# Patient Record
Sex: FEMALE | Race: ASIAN | Employment: OTHER | URBAN - METROPOLITAN AREA
[De-identification: names, ages, dates, MRNs, and addresses within clinical notes are randomized per-mention and may not be internally consistent; named-entity substitution may affect disease eponyms.]

---

## 2017-02-06 DIAGNOSIS — E03.9 ACQUIRED HYPOTHYROIDISM: ICD-10-CM

## 2017-02-06 RX ORDER — LEVOTHYROXINE SODIUM 50 UG/1
TABLET ORAL
Qty: 90 TAB | Refills: 0 | Status: SHIPPED | OUTPATIENT
Start: 2017-02-06 | End: 2017-05-05 | Stop reason: SDUPTHER

## 2017-03-24 DIAGNOSIS — I15.9 SECONDARY HYPERTENSION: ICD-10-CM

## 2017-03-27 ENCOUNTER — CLINICAL SUPPORT (OUTPATIENT)
Dept: CARDIOLOGY CLINIC | Age: 82
End: 2017-03-27

## 2017-03-27 DIAGNOSIS — Z95.0 CARDIAC PACEMAKER IN SITU: Primary | ICD-10-CM

## 2017-03-27 RX ORDER — AMLODIPINE BESYLATE 10 MG/1
TABLET ORAL
Qty: 30 TAB | Refills: 7 | Status: SHIPPED | OUTPATIENT
Start: 2017-03-27 | End: 2017-09-28 | Stop reason: SDUPTHER

## 2017-04-18 ENCOUNTER — OFFICE VISIT (OUTPATIENT)
Dept: FAMILY MEDICINE CLINIC | Age: 82
End: 2017-04-18

## 2017-04-18 VITALS
HEART RATE: 69 BPM | BODY MASS INDEX: 19.04 KG/M2 | SYSTOLIC BLOOD PRESSURE: 150 MMHG | HEIGHT: 60 IN | DIASTOLIC BLOOD PRESSURE: 73 MMHG | WEIGHT: 97 LBS | RESPIRATION RATE: 18 BRPM | TEMPERATURE: 98.4 F | OXYGEN SATURATION: 98 %

## 2017-05-05 DIAGNOSIS — E03.9 ACQUIRED HYPOTHYROIDISM: ICD-10-CM

## 2017-05-07 RX ORDER — LEVOTHYROXINE SODIUM 50 UG/1
TABLET ORAL
Qty: 90 TAB | Refills: 0 | Status: SHIPPED | OUTPATIENT
Start: 2017-05-07 | End: 2017-08-04 | Stop reason: SDUPTHER

## 2017-06-14 ENCOUNTER — OFFICE VISIT (OUTPATIENT)
Dept: FAMILY MEDICINE CLINIC | Age: 82
End: 2017-06-14

## 2017-06-14 VITALS
DIASTOLIC BLOOD PRESSURE: 76 MMHG | BODY MASS INDEX: 18.31 KG/M2 | HEART RATE: 70 BPM | OXYGEN SATURATION: 97 % | RESPIRATION RATE: 16 BRPM | SYSTOLIC BLOOD PRESSURE: 151 MMHG | HEIGHT: 60 IN | TEMPERATURE: 97.9 F | WEIGHT: 93.25 LBS

## 2017-06-14 DIAGNOSIS — I10 ESSENTIAL HYPERTENSION: ICD-10-CM

## 2017-06-14 DIAGNOSIS — K21.9 GASTROESOPHAGEAL REFLUX DISEASE WITHOUT ESOPHAGITIS: ICD-10-CM

## 2017-06-14 DIAGNOSIS — Z00.00 ROUTINE GENERAL MEDICAL EXAMINATION AT HEALTH CARE FACILITY: ICD-10-CM

## 2017-06-14 DIAGNOSIS — R55 SYNCOPE AND COLLAPSE: ICD-10-CM

## 2017-06-14 DIAGNOSIS — E03.4 HYPOTHYROIDISM DUE TO ACQUIRED ATROPHY OF THYROID: ICD-10-CM

## 2017-06-14 DIAGNOSIS — Z00.00 MEDICARE ANNUAL WELLNESS VISIT, SUBSEQUENT: Primary | ICD-10-CM

## 2017-06-14 NOTE — MR AVS SNAPSHOT
Visit Information Date & Time Provider Department Dept. Phone Encounter #  
 6/14/2017  8:55 AM Leighann Ta MD [address] [place] [address] [phone] [id number] Your Appointments 6/27/2017 10:00 AM  
PRE OP with PACEMAKER, STFRANCES  
[facility] (KITTY SCHEDULING) Appt Note: stj magnet/stf/b 3-27-17  
 [address] [facility] [address] [facility] [address]  
[phone]  
  
   
 [address] [facility] [id number] [place] [address] [place]  
  
    
 9/28/2017  9:30 AM  
PACEMAKER with Mario Alberto Band, [place] Client [facility] (KITTY SCHEDULING) Appt Note: sjm threshold/rc/Garvin 1 yr b  no [address] [place]  
[phone]  
  
   
 [address] [place] [address] [address] [place]  
  
    
 9/28/2017  9:40 AM  
ESTABLISHED PATIENT with Justin Vu MD  
[facility] ([address] [facility]) Appt Note: sjm threshold/rc/Garvin 1 yr b  no [facility] [address] [place]  
[address] [address] Upcoming Health Maintenance Date Due DTaP/Tdap/Td series (1 - Tdap) 1/1/1944 Pneumococcal 65+ Low/Medium Risk (2 of 2 - PPSV23) 7/27/2009 GLAUCOMA SCREENING Q2Y 8/28/2016 MEDICARE YEARLY EXAM 6/14/2017 Allergies as of 6/14/2017  Review Complete On: 6/14/2017 By: Leighann Ta MD  
  
 Severity Noted Reaction Type Reactions Ace Inhibitors  06/03/2011    Cough Cozaar [Losartan]  06/03/2011    Angioedema Hydrochlorothiazide  05/28/2013    Other (comments), Vertigo Weak in legs, HYPONATREMIA (109 - Sept 2014 admission) Niaspan [Niacin]  06/03/2011    Nausea Only Pravastatin  09/06/2011    Other (comments)  
 dizzy Prilosec [Omeprazole]  03/05/2013    Myalgia Sular [Nisoldipine]  06/03/2011    Vertigo Current Immunizations  Reviewed on 8/28/2014 Name Date Pneumococcal Vaccine (Unspecified Type) 7/27/2004 Not reviewed this visit You Were Diagnosed With   
  
 Codes Comments Routine general medical examination at health care facility    -  Primary ICD-10-CM: Z00.00 ICD-9-CM: V70.0 Essential hypertension     ICD-10-CM: I10 
ICD-9-CM: 401.9 Syncope and collapse     ICD-10-CM: R55 
ICD-9-CM: 780.2 Hypothyroidism due to acquired atrophy of thyroid     ICD-10-CM: E03.4 ICD-9-CM: 244.8, 246.8 Gastroesophageal reflux disease without esophagitis     ICD-10-CM: K21.9 ICD-9-CM: 530.81 Vitals BP Pulse Temp Resp Height(growth percentile) Weight(growth percentile) 151/76 70 97.9 °F (36.6 °C) (Oral) 16 5' (1.524 m) 93 lb 4 oz (42.3 kg) LMP SpO2 BMI OB Status Smoking Status 03/05/1963 97% 18.21 kg/m2 Postmenopausal Never Smoker Vitals History BMI and BSA Data Body Mass Index Body Surface Area  
 18.21 kg/m 2 1.34 m 2 Preferred Pharmacy Pharmacy Name Phone [facility] [address] [place] [address], [address] [place] [address] [phone] Your Updated Medication List  
  
   
This list is accurate as of: 6/14/17  9:31 AM.  Always use your most recent med list. amLODIPine 10 mg tablet Commonly known as:  Mosetta Hark TAKE ONE TABLET BY MOUTH DAILY  
  
 brimonidine 0.1 % ophthalmic solution Commonly known as:  ALPHAGAN P Administer 1 drop to both eyes two (2) times a day. dorzolamide-timolol 22.3-6.8 mg/mL ophthalmic solution Commonly known as:  COSOPT Administer 1 drop to both eyes two (2) times a day. DULCOLAX (BISACODYL) PO Take  by mouth. FISH OIL 1,000 mg Cap Generic drug:  omega-3 fatty acids-vitamin e Take 1 capsule by mouth daily. furosemide 20 mg tablet Commonly known as:  LASIX Take 1 tablet by mouth daily. labetalol 100 mg tablet Commonly known as:  NORMODYNE  
TAKE ONE TABLET BY MOUTH EVERY 12 HOURS  
  
 latanoprost 0.005 % ophthalmic solution Commonly known as:  Haritha Nap Administer 1 drop to both eyes nightly. levothyroxine 50 mcg tablet Commonly known as:  SYNTHROID  
TAKE ONE TABLET BY MOUTH DAILY BEFORE BREAKFAST MULTIVITAMIN PO Take 1 tablet by mouth daily. prednisoLONE acetate 1 % ophthalmic suspension Commonly known as:  PRED FORTE Administer 1 drop to left eye every hour (while awake). Introducing [facility]! Teresita Estrada introduces [employer] patient portal. Now you can access parts of your medical record, email your doctor's office, and request medication refills online. 1. In your internet browser, go to [email or web address] 2. Click on the First Time User? Click Here link in the Sign In box. You will see the New Member Sign Up page. 3. Enter your [employer] Access Code exactly as it appears below. You will not need to use this code after youve completed the sign-up process. If you do not sign up before the expiration date, you must request a new code. · [employer] Access Code: [email or web address] Expires: 7/17/2017 10:35 AM 
 
4. Enter the last four digits of your Social Security Number (xxxx) and Date of Birth (mm/dd/yyyy) as indicated and click Submit. You will be taken to the next sign-up page. 5. Create a [employer] ID. This will be your [employer] login ID and cannot be changed, so think of one that is secure and easy to remember. 6. Create a [employer] password. You can change your password at any time. 7. Enter your Password Reset Question and Answer. This can be used at a later time if you forget your password. 8. Enter your e-mail address. You will receive e-mail notification when new information is available in [address] 9. Click Sign Up. You can now view and download portions of your medical record. 10. Click the Download Summary menu link to download a portable copy of your medical information. If you have questions, please visit the Frequently Asked Questions section of the [employer] website. Remember, [employer] is NOT to be used for urgent needs. For medical emergencies, dial 911. Now available from your iPhone and Android! Please provide this summary of care documentation to your next provider. Your primary care clinician is listed as Erick Mcqueen. If you have any questions after today's visit, please call [phone].

## 2017-06-14 NOTE — PROGRESS NOTES
[address]  [address] [facility] [address]  [phone]             Date of visit: 6/14/2017       I have reviewed the patient's medical history in detail and updated the computerized patient record. History obtained from: son and the patient. Concerns today   (Patient understands that medical problems addressed today may incur additional cost as this is a preventive visit)  -none    History     Patient Active Problem List   Diagnosis Code    Other and unspecified hyperlipidemia E78.5    HTN (hypertension) I10    GERD (gastroesophageal reflux disease) K21.9    Hypothyroid E03.9    Syncope and collapse R55    Atrioventricular block, complete (HCC) I44.2    Murmur, cardiac R01.1    Hot flashes R23.2    Glaucoma H40.9    Angioedema T78. 3XXA    H. 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     Past Medical History:   Diagnosis Date    Atrioventricular block, complete ([address] 2/15/2011    GERD (gastroesophageal reflux disease) 2/15/2011    HTN (hypertension) 2/15/2011    Hypercholesteremia     Hypothyroid 2/15/2011    Murmur     Osteoporosis     Other and unspecified hyperlipidemia 2/15/2011    Pacemaker     Syncope and collapse 2/15/2011    Thyroid disease       Past Surgical History:   Procedure Laterality Date    HX GYN      hyst    HX HEENT      corneal tx    HX PACEMAKER      INS PPM/ICD LED DUAL ONLY  9/4/2014          Allergies   Allergen Reactions    Ace Inhibitors Cough    Cozaar [Losartan] Angioedema    Hydrochlorothiazide Other (comments) and Vertigo     Weak in legs, HYPONATREMIA (109 - Sept 2014 admission)    Niaspan [Niacin] Nausea Only    Pravastatin Other (comments)     dizzy    Prilosec [Omeprazole] Myalgia    Sular [Nisoldipine] Vertigo     Current Outpatient Prescriptions   Medication Sig Dispense Refill    levothyroxine (SYNTHROID) 50 mcg tablet TAKE ONE TABLET BY MOUTH DAILY BEFORE BREAKFAST 90 Tab 0    amLODIPine (NORVASC) 10 mg tablet TAKE ONE TABLET BY MOUTH DAILY 30 Tab 7    labetalol (NORMODYNE) 100 mg tablet TAKE ONE TABLET BY MOUTH EVERY 12 HOURS 60 Tab 10    DULCOLAX, BISACODYL, PO Take  by mouth.  furosemide (LASIX) 20 mg tablet Take 1 tablet by mouth daily. 30 tablet 0    prednisolone acetate (PRED FORTE) 1 % ophthalmic suspension Administer 1 drop to left eye every hour (while awake).  latanoprost (XALATAN) 0.005 % ophthalmic solution Administer 1 drop to both eyes nightly.  dorzolamide-timolol (COSOPT) 2-0.5 % ophthalmic solution Administer 1 drop to both eyes two (2) times a day.  brimonidine (ALPHAGAN P) 0.1 % ophthalmic solution Administer 1 drop to both eyes two (2) times a day.  omega-3 fatty acids-vitamin e (FISH OIL) 1,000 mg Cap Take 1 capsule by mouth daily.  MULTIVITAMIN PO Take 1 tablet by mouth daily. Family History   Problem Relation Age of Onset    Alcohol abuse Brother      Social History   Substance Use Topics    Smoking status: Never Smoker    Smokeless tobacco: Never Used    Alcohol use No       Specialists/Care Team   Mahogany Hahn has established care with the following healthcare providers:  - Dr. Yvette Collado for glaucoma  - Dr. Delfina Parker for syncope, complete heart block. Health Risk Assessment     Demographics   female  80 y.o.  [address] Questions   -During the past 4 weeks:   -how would you rate your health in general? Fair   -how often have you been bothered by feeling dizzy when standing up? occasionally   -how much have you been bothered by bodily pain? mildly   -Have you noticed any hearing difficulties?  yes   -has your physical and emotional health limited your social activities with family or friends? no    Emotional Health Questions   -Do you have a history of depression, anxiety, or emotional problems? no  -Over the past 2 weeks, have you felt down, depressed or hopeless? no  -Over the past 2 weeks, have you felt little interest or pleasure in doing things? no    Health Habits   Please describe your diet habits: well balanced  Do you get 5 servings of fruits or vegetables daily? yes  Do you exercise regularly? yes    Activities of Daily Living and Functional Status   -Do you need help with eating, walking, dressing, bathing, toileting, the phone, transportation, shopping, preparing meals, housework, laundry, medications or managing money? no  -In the past four weeks, was someone available to help you if you needed and wanted help with anything? yes  -Are you confident are you that you can control and manage most of your health problems? yes  -Have you been given information to help you keep track of your medications? yes  -How often do you have trouble taking your medications as prescribed? never    Fall Risk and Home Safety   Have you fallen 2 or more times in the past year? yes and but no fractures. Does your home have rugs in the hallway, lack grab bars in the bathroom, lack handrails on the stairs or have poor lighting? yes  Do you have smoke detectors and check them regularly? yes  Do you have difficulties driving a car? no  Do you always fasten your seat belt when you are in a car? yes    Physical Examination     Vitals:    06/14/17 0852   BP: 151/76   Pulse: 70   Resp: 16   Temp: 97.9 °F (36.6 °C)   TempSrc: Oral   SpO2: 97%   Weight: 93 lb 4 oz (42.3 kg)   Height: 5' (1.524 m)     Body mass index is 18.21 kg/(m^2). No exam data present  Was the patient's timed Up & Go test unsteady or longer than 30 seconds?  yes    Evaluation of Cognitive Function   Mood/affect:  happy  Orientation: Person, Place, Time and Situation  Appearance: age appropriate    Preventive Services     (Preventive care checklist to be included in patient instructions)  Discussed today Done Previously Not Needed     x  Pneumococcal vaccines   x   Flu vaccine     x Hepatitis B vaccine (if at risk)   x   Shingles vaccine   x   TDAP vaccine     x Mammogram     x Pap smear     x Colorectal cancer screening     x Low-dose CT for lung cancer screening     x Bone density test    x  Glaucoma screening     x Cholesterol test     x Diabetes screening test      x Diabetes self-management class     x Nutritionist referral for diabetes or renal disease     Son and patient refuse immunization. Discussion of Advance Directive   Discussed with Kim Huffman her ability to prepare and advance directive in the case that an injury or illness causes her to be unable to make health care decisions.     - Son is mPOA. - advanced directive documentation given. Son will fill out the form and will bring it. Assessment/Plan     1. Routine general medical examination at health care facility  A health 80year old female. Doesn't like immunization anymore. 2. Essential hypertension  Mildly elevated. RTC in a month to recheck     3. Syncope and collapse  Continue follow up with Cards. 4. Hypothyroidism due to acquired atrophy of thyroid  Asymptomatic. Continue taking synthroid. AVS was printed, given to patient and briefly discussed prior to patient's departure from the office today.      Isidra Corey MD  [facility] Medicine Resident  Dwight Gomez [address], [address] [place] [address]

## 2017-07-05 ENCOUNTER — CLINICAL SUPPORT (OUTPATIENT)
Dept: CARDIOLOGY CLINIC | Age: 82
End: 2017-07-05

## 2017-07-05 DIAGNOSIS — Z95.0 CARDIAC PACEMAKER IN SITU: Primary | ICD-10-CM

## 2017-07-27 ENCOUNTER — TELEPHONE (OUTPATIENT)
Dept: FAMILY MEDICINE CLINIC | Age: 82
End: 2017-07-27

## 2017-07-27 NOTE — TELEPHONE ENCOUNTER
Patient sister (Cholo Larson) on hippa. Calling and states her mother is out of town with her sister in [place], [place]). Patient have fell down twice, last night and also last week. Patient is complaining of her legs bothering her. Asking if she should be taken to Emergency Department and to see a doctor there?     Call [phone]    thanks

## 2017-07-28 ENCOUNTER — ANESTHESIA EVENT (OUTPATIENT)
Dept: SURGERY | Age: 82
DRG: 470 | End: 2017-07-28
Payer: MEDICARE

## 2017-07-28 ENCOUNTER — APPOINTMENT (OUTPATIENT)
Dept: GENERAL RADIOLOGY | Age: 82
DRG: 470 | End: 2017-07-28
Attending: EMERGENCY MEDICINE
Payer: MEDICARE

## 2017-07-28 ENCOUNTER — HOSPITAL ENCOUNTER (INPATIENT)
Age: 82
LOS: 7 days | Discharge: NURSING, REHAB OR LONG-TERM CARE | DRG: 470 | End: 2017-08-04
Attending: EMERGENCY MEDICINE | Admitting: FAMILY MEDICINE
Payer: MEDICARE

## 2017-07-28 DIAGNOSIS — S72.001S HIP FRACTURE, RIGHT, SEQUELA: ICD-10-CM

## 2017-07-28 DIAGNOSIS — Z71.89 GOALS OF CARE, COUNSELING/DISCUSSION: ICD-10-CM

## 2017-07-28 DIAGNOSIS — S72.011A SUBCAPITAL FRACTURE OF FEMUR, RIGHT, CLOSED, INITIAL ENCOUNTER (HCC): Primary | ICD-10-CM

## 2017-07-28 DIAGNOSIS — R53.81 DEBILITY: ICD-10-CM

## 2017-07-28 PROBLEM — S72.001A HIP FRACTURE, RIGHT (HCC): Status: ACTIVE | Noted: 2017-07-28

## 2017-07-28 LAB
ANION GAP BLD CALC-SCNC: 10 MMOL/L (ref 5–15)
BASOPHILS # BLD AUTO: 0 K/UL (ref 0–0.1)
BASOPHILS # BLD: 0 % (ref 0–1)
BUN SERPL-MCNC: 16 MG/DL (ref 6–20)
BUN/CREAT SERPL: 22 (ref 12–20)
CALCIUM SERPL-MCNC: 8.8 MG/DL (ref 8.5–10.1)
CHLORIDE SERPL-SCNC: 97 MMOL/L (ref 97–108)
CO2 SERPL-SCNC: 25 MMOL/L (ref 21–32)
CREAT SERPL-MCNC: 0.72 MG/DL (ref 0.55–1.02)
EOSINOPHIL # BLD: 0.1 K/UL (ref 0–0.4)
EOSINOPHIL NFR BLD: 1 % (ref 0–7)
ERYTHROCYTE [DISTWIDTH] IN BLOOD BY AUTOMATED COUNT: 14.6 % (ref 11.5–14.5)
GLUCOSE BLD STRIP.AUTO-MCNC: 112 MG/DL (ref 65–100)
GLUCOSE BLD STRIP.AUTO-MCNC: 186 MG/DL (ref 65–100)
GLUCOSE SERPL-MCNC: 215 MG/DL (ref 65–100)
HCT VFR BLD AUTO: 37.8 % (ref 35–47)
HGB BLD-MCNC: 12.8 G/DL (ref 11.5–16)
LYMPHOCYTES # BLD AUTO: 13 % (ref 12–49)
LYMPHOCYTES # BLD: 0.9 K/UL (ref 0.8–3.5)
MCH RBC QN AUTO: 30.2 PG (ref 26–34)
MCHC RBC AUTO-ENTMCNC: 33.9 G/DL (ref 30–36.5)
MCV RBC AUTO: 89.2 FL (ref 80–99)
MONOCYTES # BLD: 0.6 K/UL (ref 0–1)
MONOCYTES NFR BLD AUTO: 8 % (ref 5–13)
NEUTS SEG # BLD: 5.7 K/UL (ref 1.8–8)
NEUTS SEG NFR BLD AUTO: 78 % (ref 32–75)
PLATELET # BLD AUTO: 298 K/UL (ref 150–400)
POTASSIUM SERPL-SCNC: 4.1 MMOL/L (ref 3.5–5.1)
RBC # BLD AUTO: 4.24 M/UL (ref 3.8–5.2)
SERVICE CMNT-IMP: ABNORMAL
SERVICE CMNT-IMP: ABNORMAL
SODIUM SERPL-SCNC: 132 MMOL/L (ref 136–145)
WBC # BLD AUTO: 7.3 K/UL (ref 3.6–11)

## 2017-07-28 PROCEDURE — 73080 X-RAY EXAM OF ELBOW: CPT

## 2017-07-28 PROCEDURE — 73502 X-RAY EXAM HIP UNI 2-3 VIEWS: CPT

## 2017-07-28 PROCEDURE — 96374 THER/PROPH/DIAG INJ IV PUSH: CPT

## 2017-07-28 PROCEDURE — 74011000250 HC RX REV CODE- 250: Performed by: STUDENT IN AN ORGANIZED HEALTH CARE EDUCATION/TRAINING PROGRAM

## 2017-07-28 PROCEDURE — 77030020186 HC BOOT HL PROTCT SAGE -B

## 2017-07-28 PROCEDURE — 74011250637 HC RX REV CODE- 250/637: Performed by: STUDENT IN AN ORGANIZED HEALTH CARE EDUCATION/TRAINING PROGRAM

## 2017-07-28 PROCEDURE — 74011250636 HC RX REV CODE- 250/636: Performed by: NURSE PRACTITIONER

## 2017-07-28 PROCEDURE — 65660000000 HC RM CCU STEPDOWN

## 2017-07-28 PROCEDURE — 85025 COMPLETE CBC W/AUTO DIFF WBC: CPT | Performed by: EMERGENCY MEDICINE

## 2017-07-28 PROCEDURE — 99284 EMERGENCY DEPT VISIT MOD MDM: CPT

## 2017-07-28 PROCEDURE — 90471 IMMUNIZATION ADMIN: CPT

## 2017-07-28 PROCEDURE — 36415 COLL VENOUS BLD VENIPUNCTURE: CPT | Performed by: EMERGENCY MEDICINE

## 2017-07-28 PROCEDURE — 90715 TDAP VACCINE 7 YRS/> IM: CPT | Performed by: EMERGENCY MEDICINE

## 2017-07-28 PROCEDURE — 82962 GLUCOSE BLOOD TEST: CPT

## 2017-07-28 PROCEDURE — 80048 BASIC METABOLIC PNL TOTAL CA: CPT | Performed by: EMERGENCY MEDICINE

## 2017-07-28 PROCEDURE — 74011250636 HC RX REV CODE- 250/636: Performed by: EMERGENCY MEDICINE

## 2017-07-28 RX ORDER — HYDROMORPHONE HYDROCHLORIDE 1 MG/ML
0.2 INJECTION, SOLUTION INTRAMUSCULAR; INTRAVENOUS; SUBCUTANEOUS
Status: DISCONTINUED | OUTPATIENT
Start: 2017-07-28 | End: 2017-07-31

## 2017-07-28 RX ORDER — DORZOLAMIDE HYDROCHLORIDE AND TIMOLOL MALEATE 20; 5 MG/ML; MG/ML
1 SOLUTION/ DROPS OPHTHALMIC 2 TIMES DAILY
Status: DISCONTINUED | OUTPATIENT
Start: 2017-07-28 | End: 2017-08-04 | Stop reason: HOSPADM

## 2017-07-28 RX ORDER — LABETALOL 100 MG/1
100 TABLET, FILM COATED ORAL 2 TIMES DAILY
Status: DISCONTINUED | OUTPATIENT
Start: 2017-07-28 | End: 2017-08-04 | Stop reason: HOSPADM

## 2017-07-28 RX ORDER — MAGNESIUM SULFATE 100 %
4 CRYSTALS MISCELLANEOUS AS NEEDED
Status: DISCONTINUED | OUTPATIENT
Start: 2017-07-28 | End: 2017-08-04 | Stop reason: HOSPADM

## 2017-07-28 RX ORDER — LEVOTHYROXINE SODIUM 50 UG/1
50 TABLET ORAL
Status: DISCONTINUED | OUTPATIENT
Start: 2017-07-29 | End: 2017-08-04 | Stop reason: HOSPADM

## 2017-07-28 RX ORDER — SODIUM CHLORIDE 0.9 % (FLUSH) 0.9 %
5-10 SYRINGE (ML) INJECTION EVERY 8 HOURS
Status: DISCONTINUED | OUTPATIENT
Start: 2017-07-28 | End: 2017-08-03

## 2017-07-28 RX ORDER — SODIUM CHLORIDE 0.9 % (FLUSH) 0.9 %
5-10 SYRINGE (ML) INJECTION AS NEEDED
Status: DISCONTINUED | OUTPATIENT
Start: 2017-07-28 | End: 2017-08-03

## 2017-07-28 RX ORDER — LATANOPROST 50 UG/ML
1 SOLUTION/ DROPS OPHTHALMIC
Status: DISCONTINUED | OUTPATIENT
Start: 2017-07-28 | End: 2017-08-04 | Stop reason: HOSPADM

## 2017-07-28 RX ORDER — DEXTROSE 50 % IN WATER (D50W) INTRAVENOUS SYRINGE
12.5-25 AS NEEDED
Status: DISCONTINUED | OUTPATIENT
Start: 2017-07-28 | End: 2017-08-04 | Stop reason: HOSPADM

## 2017-07-28 RX ORDER — AMLODIPINE BESYLATE 5 MG/1
10 TABLET ORAL DAILY
Status: DISCONTINUED | OUTPATIENT
Start: 2017-07-29 | End: 2017-08-04 | Stop reason: HOSPADM

## 2017-07-28 RX ORDER — BISACODYL 5 MG
5 TABLET, DELAYED RELEASE (ENTERIC COATED) ORAL AS NEEDED
COMMUNITY

## 2017-07-28 RX ORDER — ACETAMINOPHEN 325 MG/1
650 TABLET ORAL
Status: DISCONTINUED | OUTPATIENT
Start: 2017-07-28 | End: 2017-07-29

## 2017-07-28 RX ORDER — INSULIN LISPRO 100 [IU]/ML
INJECTION, SOLUTION INTRAVENOUS; SUBCUTANEOUS
Status: DISCONTINUED | OUTPATIENT
Start: 2017-07-28 | End: 2017-07-29

## 2017-07-28 RX ORDER — HEPARIN SODIUM 5000 [USP'U]/ML
5000 INJECTION, SOLUTION INTRAVENOUS; SUBCUTANEOUS ONCE
Status: COMPLETED | OUTPATIENT
Start: 2017-07-28 | End: 2017-07-28

## 2017-07-28 RX ORDER — CEFAZOLIN SODIUM IN 0.9 % NACL 2 G/50 ML
2 INTRAVENOUS SOLUTION, PIGGYBACK (ML) INTRAVENOUS
Status: COMPLETED | OUTPATIENT
Start: 2017-07-29 | End: 2017-07-29

## 2017-07-28 RX ORDER — FENTANYL CITRATE 50 UG/ML
25 INJECTION, SOLUTION INTRAMUSCULAR; INTRAVENOUS
Status: COMPLETED | OUTPATIENT
Start: 2017-07-28 | End: 2017-07-28

## 2017-07-28 RX ORDER — THERA TABS 400 MCG
1 TAB ORAL DAILY
COMMUNITY
End: 2017-09-28

## 2017-07-28 RX ADMIN — Medication 10 ML: at 20:08

## 2017-07-28 RX ADMIN — LATANOPROST 1 DROP: 50 SOLUTION OPHTHALMIC at 20:07

## 2017-07-28 RX ADMIN — TETANUS TOXOID, REDUCED DIPHTHERIA TOXOID AND ACELLULAR PERTUSSIS VACCINE, ADSORBED 0.5 ML: 5; 2.5; 8; 8; 2.5 SUSPENSION INTRAMUSCULAR at 14:49

## 2017-07-28 RX ADMIN — LABETALOL HCL 100 MG: 100 TABLET, FILM COATED ORAL at 19:11

## 2017-07-28 RX ADMIN — HEPARIN SODIUM 5000 UNITS: 5000 INJECTION, SOLUTION INTRAVENOUS; SUBCUTANEOUS at 20:06

## 2017-07-28 RX ADMIN — ACETAMINOPHEN 650 MG: 325 TABLET ORAL at 19:10

## 2017-07-28 RX ADMIN — FENTANYL CITRATE 25 MCG: 50 INJECTION, SOLUTION INTRAMUSCULAR; INTRAVENOUS at 14:54

## 2017-07-28 RX ADMIN — BRIMONIDINE TARTRATE 1 DROP: 1 SOLUTION/ DROPS OPHTHALMIC at 20:06

## 2017-07-28 RX ADMIN — DORZOLAMIDE HYDROCHLORIDE AND TIMOLOL MALEATE 1 DROP: 20; 5 SOLUTION/ DROPS OPHTHALMIC at 20:06

## 2017-07-28 NOTE — PROGRESS NOTES
Bedside shift change report given to Lyla Landa RN and Kaci Napoles RN (oncoming nurse) by GAIL Ray (offgoing nurse). Report included the following information SBAR, Kardex, MAR and Recent Results.

## 2017-07-28 NOTE — ED PROVIDER NOTES
HPI Comments: 80 y.o. female with past medical history significant for HTN, GERD, hypothyroid, osteoporosis, hypercholesterolemia, pacemaker, murmur, hysterectomy who presents accompanied by relatives with chief complaint of pelvic pain. Per daughter, patient had an unwitnessed fall in the bathroom ~2-3 days ago. She notes patient fell backwards onto buttocks and b/l elbows. Daughter denies any patient head injury or LOC. She notes patient was able to ambulate initially with assistance but claims that for the past 2 days, patient has not been able to ambulate due to pain in right pelvic area. Patient is able to ambulate with walker at baseline. In ED, patient has minor abrasion on right elbow. She reports pain in right pelvic area with position changes and is unable to raise right leg against gravity due to pain. Relative states she has been giving patient 200 mg Advil for pain and ntoes last dose was given ~1 hour ago. Relatives are unsure if patient's tetanus is UTD. Relative denies any patient fever, cough, chest pain, abdominal pain, SOB. There are no other acute medical concerns at this time. Social hx: non-smoker. Denies ETOH use. Resides with daughter. PCP: Marcella Soares MD    Note written by Lorie Garcia. Sheri Esqueda, as dictated by Dang Vences MD 1:39 PM      The history is provided by the patient and a relative.  used: Relative translated.          Past Medical History:   Diagnosis Date    Atrioventricular block, complete ([facility] 2/15/2011    GERD (gastroesophageal reflux disease) 2/15/2011    HTN (hypertension) 2/15/2011    Hypercholesteremia     Hypothyroid 2/15/2011    Murmur     Osteoporosis     Other and unspecified hyperlipidemia 2/15/2011    Pacemaker     Syncope and collapse 2/15/2011    Thyroid disease        Past Surgical History:   Procedure Laterality Date    HX GYN      hyst    HX HEENT      corneal tx    HX PACEMAKER      INS PPM/ICD LED DUAL ONLY 9/4/2014              Family History:   Problem Relation Age of Onset    Alcohol abuse Brother        Social History     Social History    Marital status:      Spouse name: N/A    Number of children: N/A    Years of education: N/A     Occupational History    Not on file. Social History Main Topics    Smoking status: Never Smoker    Smokeless tobacco: Never Used    Alcohol use No    Drug use: No    Sexual activity: No     Other Topics Concern    Not on file     Social History Narrative         ALLERGIES: Ace inhibitors; Cozaar [losartan]; Hydrochlorothiazide; Niaspan [niacin]; Pravastatin; Prilosec [omeprazole]; and Sular [nisoldipine]    Review of Systems   Constitutional: Negative for chills, fatigue and fever. HENT: Negative for congestion, rhinorrhea and sore throat. Eyes: Negative for visual disturbance. Respiratory: Negative for cough and shortness of breath. Cardiovascular: Negative for chest pain. Gastrointestinal: Negative for abdominal pain, diarrhea, nausea and vomiting. Genitourinary: Positive for pelvic pain (right). Negative for difficulty urinating, dysuria and hematuria. Musculoskeletal: Positive for gait problem. Negative for back pain and neck pain. Skin: Positive for wound (minor abrasion; right elbow). Negative for rash. Neurological: Negative for dizziness, syncope and headaches. All other systems reviewed and are negative. Vitals:    07/28/17 1326 07/28/17 1457 07/28/17 1500 07/28/17 1515   BP: 152/68 153/58 126/89 135/56   Pulse: 77      Resp: 15      Temp: 97.7 °F (36.5 °C)      SpO2: 97% 96% 93% 91%   Weight: 44.5 kg (98 lb)      Height: 4' 11\" (1.499 m)               Physical Exam   Constitutional: She is oriented to person, place, and time. She appears well-developed and well-nourished. No distress. HENT:   Head: Normocephalic and atraumatic. Eyes: Conjunctivae and EOM are normal. Pupils are equal, round, and reactive to light.    Neck: Normal range of motion. Cardiovascular: Normal rate, regular rhythm, normal heart sounds and intact distal pulses. Exam reveals no friction rub. No murmur heard. Pulmonary/Chest: Effort normal and breath sounds normal. No respiratory distress. She has no wheezes. She has no rales. She exhibits no tenderness. Abdominal: Soft. Bowel sounds are normal. She exhibits no distension. There is no tenderness. There is no rebound and no guarding. Musculoskeletal: Normal range of motion. She exhibits no edema or tenderness. Neurological: She is alert and oriented to person, place, and time. She exhibits normal muscle tone. Coordination normal.   Skin: Skin is warm and dry. She is not diaphoretic. No pallor. Abrasion to right elbow    Right hip with no ROM due to pain; palpable pulses   Psychiatric: She has a normal mood and affect. Her behavior is normal.   Nursing note and vitals reviewed. MDM  Number of Diagnoses or Management Options  Subcapital fracture of femur, right, closed, initial encounter [facility]):   Diagnosis management comments: Suspect hip( fx will get xray    Xray elbow as well for fx, update tetanus  Pain control       Amount and/or Complexity of Data Reviewed  Clinical lab tests: ordered and reviewed  Tests in the radiology section of CPT®: ordered and reviewed  Discuss the patient with other providers: yes (ortho)    Patient Progress  Patient progress: stable    ED Course       Procedures     CONSULT NOTE:  2:40 PM Tori Arriola MD spoke with Lidia Leone NP, Consult for Orthopedics. Discussed available diagnostic tests and clinical findings. She is in agreement with care plans as outlined. Lidia Leone NP will see patient. CONSULT NOTE:  2:56 PM Tori Arriola MD spoke with Polly Castellano for [facility] resident. Discussed available diagnostic tests and clinical findings. He is in agreement with care plans as outlined. Viviana Murillo will admit patient.

## 2017-07-28 NOTE — CONSULTS
Orthopedic PRE-OP Admission History and Physical        NAME: Sidney Gregory       :  1923       MRN:  [id number]      Subjective:     Patient is a 80 y.o.  female who presents with a ground level fall 2 days ago. Pt. Had increase in right hip pain. Pt continued to walk on right hip with her walker. She lives with her son. She ambulates with her walker around the home. She was then unable to ambulate at all due to severe right hip pain. Xrays reveal right subcapital hip fracture. Son and daughter in room. Pt is [place]. Son and daughter provide ROS and history.      Patient Active Problem List    Diagnosis Date Noted    Hip fracture, right ([address] 2017    Systemic elastorrhexis 2016    History of cornea transplant 2016    Open-angle glaucoma 2016    Rejection of corneal graft 2016    Hyperglycemia 2014    S/P placement of cardiac pacemaker 2014    Third degree heart block ([address] 2014    Advance directive discussed with patient 2014    Health care maintenance 2014    H. pylori infection 2013    Angioedema 2013    Glaucoma 2012    Hot flashes 2011    Murmur, cardiac 2011    Other and unspecified hyperlipidemia 02/15/2011    HTN (hypertension) 02/15/2011    GERD (gastroesophageal reflux disease) 02/15/2011    Hypothyroid 02/15/2011    Syncope and collapse 02/15/2011    Atrioventricular block, complete ([address] 02/15/2011     Past Medical History:   Diagnosis Date    Atrioventricular block, complete ([address] 2/15/2011    GERD (gastroesophageal reflux disease) 2/15/2011    HTN (hypertension) 2/15/2011    Hypercholesteremia     Hypothyroid 2/15/2011    Murmur     Osteoporosis     Other and unspecified hyperlipidemia 2/15/2011    Pacemaker     Syncope and collapse 2/15/2011    Thyroid disease       Past Surgical History:   Procedure Laterality Date    HX GYN      hyst    HX HEENT      corneal tx    HX PACEMAKER      INS PPM/ICD LED DUAL ONLY  9/4/2014           Prior to Admission medications    Medication Sig Start Date End Date Taking? Authorizing Provider   therapeutic multivitamin [facility]) tablet Take 1 Tab by mouth daily. Yes Historical Provider   levothyroxine (SYNTHROID) 50 mcg tablet TAKE ONE TABLET BY MOUTH DAILY BEFORE BREAKFAST 5/7/17  Yes Yessica Kamara MD   amLODIPine (NORVASC) 10 mg tablet TAKE ONE TABLET BY MOUTH DAILY 3/27/17  Yes Yessica Kamara MD   labetalol (NORMODYNE) 100 mg tablet TAKE ONE TABLET BY MOUTH EVERY 12 HOURS 9/18/16  Yes Yessica Kamara MD   furosemide (LASIX) 20 mg tablet Take 1 tablet by mouth daily. 9/8/14  Yes James Hunter MD   latanoprost (XALATAN) 0.005 % ophthalmic solution Administer 1 drop to both eyes nightly. Yes Historical Provider   dorzolamide-timolol (COSOPT) 2-0.5 % ophthalmic solution Administer 1 drop to both eyes two (2) times a day. Yes Historical Provider   brimonidine (ALPHAGAN P) 0.1 % ophthalmic solution Administer 1 drop to both eyes two (2) times a day. Yes Historical Provider   omega-3 fatty acids-vitamin e (FISH OIL) 1,000 mg Cap Take 1 capsule by mouth daily. Yes Historical Provider   bisacodyl (DULCOLAX, BISACODYL,) 5 mg EC tablet Take 5 mg by mouth as needed for Constipation.     Historical Provider     Current Facility-Administered Medications   Medication Dose Route Frequency    [START ON 7/29/2017] amLODIPine (NORVASC) tablet 10 mg  10 mg Oral DAILY    brimonidine (ALPHAGAN P) 0.1 % ophthalmic solution 1 Drop  1 Drop Both Eyes BID    dorzolamide-timolol (COSOPT) 22.3-6.8 mg/mL ophthalmic solution 1 Drop  1 Drop Both Eyes BID    labetalol (NORMODYNE) tablet 100 mg  100 mg Oral BID    latanoprost (XALATAN) 0.005 % ophthalmic solution 1 Drop  1 Drop Both Eyes QHS    [START ON 7/29/2017] levothyroxine (SYNTHROID) tablet 50 mcg  50 mcg Oral ACB    glucose chewable tablet 16 g  4 Tab Oral PRN    dextrose (D50W) injection syrg 12.5-25 g  12.5-25 g IntraVENous PRN    glucagon (GLUCAGEN) injection 1 mg  1 mg IntraMUSCular PRN    sodium chloride (NS) flush 5-10 mL  5-10 mL IntraVENous Q8H    sodium chloride (NS) flush 5-10 mL  5-10 mL IntraVENous PRN    acetaminophen (TYLENOL) tablet 650 mg  650 mg Oral Q4H PRN    HYDROmorphone (PF) (DILAUDID) injection 0.2 mg  0.2 mg IntraVENous Q4H PRN    insulin lispro (HUMALOG) injection   SubCUTAneous AC&HS     Current Outpatient Prescriptions   Medication Sig    therapeutic multivitamin (THERAGRAN) tablet Take 1 Tab by mouth daily.  levothyroxine (SYNTHROID) 50 mcg tablet TAKE ONE TABLET BY MOUTH DAILY BEFORE BREAKFAST    amLODIPine (NORVASC) 10 mg tablet TAKE ONE TABLET BY MOUTH DAILY    labetalol (NORMODYNE) 100 mg tablet TAKE ONE TABLET BY MOUTH EVERY 12 HOURS    furosemide (LASIX) 20 mg tablet Take 1 tablet by mouth daily.  latanoprost (XALATAN) 0.005 % ophthalmic solution Administer 1 drop to both eyes nightly.  dorzolamide-timolol (COSOPT) 2-0.5 % ophthalmic solution Administer 1 drop to both eyes two (2) times a day.  brimonidine (ALPHAGAN P) 0.1 % ophthalmic solution Administer 1 drop to both eyes two (2) times a day.  omega-3 fatty acids-vitamin e (FISH OIL) 1,000 mg Cap Take 1 capsule by mouth daily.  bisacodyl (DULCOLAX, BISACODYL,) 5 mg EC tablet Take 5 mg by mouth as needed for Constipation.       Allergies   Allergen Reactions    Ace Inhibitors Cough    Cozaar [Losartan] Angioedema    Hydrochlorothiazide Other (comments) and Vertigo     Weak in legs, HYPONATREMIA (109 - Sept 2014 admission)    Niaspan [Niacin] Nausea Only    Pravastatin Other (comments)     dizzy    Prilosec [Omeprazole] Myalgia    Sular [Nisoldipine] Vertigo      Social History   Substance Use Topics    Smoking status: Never Smoker    Smokeless tobacco: Never Used    Alcohol use No      Family History   Problem Relation Age of Onset    Alcohol abuse Brother         Review of Systems  A comprehensive review of systems was negative except for that written in the HPI. Objective:     Patient Vitals for the past 8 hrs:   BP Temp Pulse Resp SpO2 Height Weight   17 1515 135/56 - - - 91 % - -   17 1500 126/89 - - - 93 % - -   17 1457 153/58 - - - 96 % - -   17 1326 152/68 97.7 °F (36.5 °C) 77 15 97 % 4' 11\" (1.499 m) 44.5 kg (98 lb)     Temp (24hrs), Av.7 °F (36.5 °C), Min:97.7 °F (36.5 °C), Max:97.7 °F (36.5 °C)      Gen: Well-developed, petite, in no acute distress   Musc: Right LE shortened and externally rotated. PP +2. Full sensation throughout. Wiggles toes without difficulty. Unable to raise RLE. No open areas. Skin:  Clean, dry, intact. No rash, ulcerations. Skin turgor good  Neuro: Cranial nerves are grossly intact, no focal motor weakness, follows commands appropriately   Psych: Good insight, oriented to person, place and time, alert      Imaging Review    Imaging Review: EXAM:  XR HIP RT W OR WO PELV 2-3 VWS     INDICATION:   hip pain fall cant walk.     COMPARISON: .     FINDINGS: An AP view of the pelvis and a frogleg lateral view of the right hip  demonstrate a subcapital right proximal femur fracture with superior  displacement of the distal fracture fragment and varus angulation. There are  old right superior and inferior pubic ramus fractures.     IMPRESSION:  Subcapital right proximal femur fracture.          Labs:   Recent Results (from the past 24 hour(s))   CBC WITH AUTOMATED DIFF    Collection Time: 17  2:43 PM   Result Value Ref Range    WBC 7.3 3.6 - 11.0 K/uL    RBC 4.24 3.80 - 5.20 M/uL    HGB 12.8 11.5 - 16.0 g/dL    HCT 37.8 35.0 - 47.0 %    MCV 89.2 80.0 - 99.0 FL    MCH 30.2 26.0 - 34.0 PG    MCHC 33.9 30.0 - 36.5 g/dL    RDW 14.6 (H) 11.5 - 14.5 %    PLATELET 119 995 - 842 K/uL    NEUTROPHILS 78 (H) 32 - 75 %    LYMPHOCYTES 13 12 - 49 %    MONOCYTES 8 5 - 13 %    EOSINOPHILS 1 0 - 7 %    BASOPHILS 0 0 - 1 %    ABS. NEUTROPHILS 5.7 1.8 - 8.0 K/UL    ABS. LYMPHOCYTES 0.9 0.8 - 3.5 K/UL    ABS. MONOCYTES 0.6 0.0 - 1.0 K/UL    ABS. EOSINOPHILS 0.1 0.0 - 0.4 K/UL    ABS. BASOPHILS 0.0 0.0 - 0.1 K/UL   METABOLIC PANEL, BASIC    Collection Time: 07/28/17  2:43 PM   Result Value Ref Range    Sodium 132 (L) 136 - 145 mmol/L    Potassium 4.1 3.5 - 5.1 mmol/L    Chloride 97 97 - 108 mmol/L    CO2 25 21 - 32 mmol/L    Anion gap 10 5 - 15 mmol/L    Glucose 215 (H) 65 - 100 mg/dL    BUN 16 6 - 20 MG/DL    Creatinine 0.72 0.55 - 1.02 MG/DL    BUN/Creatinine ratio 22 (H) 12 - 20      GFR est AA >60 >60 ml/min/1.73m2    GFR est non-AA >60 >60 ml/min/1.73m2    Calcium 8.8 8.5 - 10.1 MG/DL       Assessment:     Patient Active Problem List    Diagnosis Date Noted    Hip fracture, right ([address] 07/28/2017    Systemic elastorrhexis 03/28/2016    History of cornea transplant 03/28/2016    Open-angle glaucoma 03/28/2016    Rejection of corneal graft 03/28/2016    Hyperglycemia 09/16/2014    S/P placement of cardiac pacemaker 09/04/2014    Third degree heart block ([address] 09/03/2014    Advance directive discussed with patient 08/28/2014    Health care maintenance 08/28/2014    H. pylori infection 05/28/2013    Angioedema 04/30/2013    Glaucoma 04/03/2012    Hot flashes 09/26/2011    Murmur, cardiac 08/30/2011    Other and unspecified hyperlipidemia 02/15/2011    HTN (hypertension) 02/15/2011    GERD (gastroesophageal reflux disease) 02/15/2011    Hypothyroid 02/15/2011    Syncope and collapse 02/15/2011    Atrioventricular block, complete ([address] 02/15/2011         Plan:   Right subcapital hip fx   Reviewed with patient and family nature of condition and treatment options. Family is still deciding whether or not to proceed with surgery. Reviewed in detail with family about risks and benefits of surgery vs conservative treatment. Medicine will admit, no contraindications to planned surgical intervention.    Discussed with Dr. iLve Perez, whom will discuss with one of his partners about surgical timing. U/A to be obtained. Family understands and willing to proceed  NPO after MN.    Case posted to follow Dr. Nik seay in the am.       Jameel Arias, NP

## 2017-07-28 NOTE — ED TRIAGE NOTES
Patient arrives with son (is translating for patient) with the complaint of right hip and leg pain after a fall last week. States that she is having trouble bearing weight on the right leg. Denies any LOC with the fall, was a mechanical fall.

## 2017-07-28 NOTE — H&P
[facility]   Admission H&P    Date of admission: 7/28/2017    Patient name: Elysia Vilchis  MRN: [id number]  YOB: 1923  Age: 80 y.o. Primary care provider:  Mal Mason MD     Source of Information: patient, medical records    Chief complaint:  Pelvic pain    History of Present Illness  Elysia Vilchis is a 80 y.o. female with a h/o HTN, GERD, Hypothyroidism, 3rd degree AV block s/p pacer, Glaucoma, and osteoporosis who presents to the ER complaining of right-sided pelvic pain. History is per daughter and son (main caretakers). Per daughter, whom the patient was visiting for a 3 week stay in [place], reports that the patient was transferring from her walker to the toilet when she let go of her walker and fell to the floor on her bottom, catching herself on both elbows. No reported LOC. Initially after the fall the pt could ambulate fine however, since two days ago the pt reports being unable to get up. Pt reports she is unable to ambulate due to right-sided pelvic pain. Daughter states she has been giving patient 200 mg Advil for pain which has not helped greatly. Family denies any fever, cough, chest pain, abdominal pain, SOB. Of note, pt stays primarily with son but will visit with daughter in [place] so that primary caretaker can have a break. 8 children in total. Code status not discussed with entire family. No, advance directives or POA established. Code status per primary caregiver as he reports having had conversations with pt about wishes. In the ER, vital signs were remarkable for BP of 153/58, otherwise stable. Labs were remarkable for glucose of 215. Right hip X-ray showed fracture of proximal femure. Pt was treated with fentanyl, administered a Tdap vaccine and ortho and family practice was consulted. Home Medications   Prior to Admission medications    Medication Sig Start Date End Date Taking?  Authorizing Provider   therapeutic multivitamin [facility]) tablet Take 1 Tab by mouth daily. Yes Historical Provider   levothyroxine (SYNTHROID) 50 mcg tablet TAKE ONE TABLET BY MOUTH DAILY BEFORE BREAKFAST 5/7/17  Yes Tyra Lopes MD   amLODIPine (NORVASC) 10 mg tablet TAKE ONE TABLET BY MOUTH DAILY 3/27/17  Yes Tyra Lopes MD   labetalol (NORMODYNE) 100 mg tablet TAKE ONE TABLET BY MOUTH EVERY 12 HOURS 9/18/16  Yes Tyra Lopes MD   furosemide (LASIX) 20 mg tablet Take 1 tablet by mouth daily. 9/8/14  Yes Russell Zhong MD   latanoprost (XALATAN) 0.005 % ophthalmic solution Administer 1 drop to both eyes nightly. Yes Historical Provider   dorzolamide-timolol (COSOPT) 2-0.5 % ophthalmic solution Administer 1 drop to both eyes two (2) times a day. Yes Historical Provider   brimonidine (ALPHAGAN P) 0.1 % ophthalmic solution Administer 1 drop to both eyes two (2) times a day. Yes Historical Provider   omega-3 fatty acids-vitamin e (FISH OIL) 1,000 mg Cap Take 1 capsule by mouth daily. Yes Historical Provider   bisacodyl (DULCOLAX, BISACODYL,) 5 mg EC tablet Take 5 mg by mouth as needed for Constipation.     Historical Provider       Allergies   Allergies   Allergen Reactions    Ace Inhibitors Cough    Cozaar [Losartan] Angioedema    Hydrochlorothiazide Other (comments) and Vertigo     Weak in legs, HYPONATREMIA (109 - Sept 2014 admission)    Niaspan [Niacin] Nausea Only    Pravastatin Other (comments)     dizzy    Prilosec [Omeprazole] Myalgia    Sular [Nisoldipine] Vertigo       Past Medical History:   Diagnosis Date    [address]rioventricular block, complete ([facility] 2/15/2011    GERD (gastroesophageal reflux disease) 2/15/2011    HTN (hypertension) 2/15/2011    Hypercholesteremia     Hypothyroid 2/15/2011    Murmur     Osteoporosis     Other and unspecified hyperlipidemia 2/15/2011    Pacemaker     Syncope and collapse 2/15/2011    Thyroid disease        Past Surgical History:   Procedure Laterality Date    HX GYN      hyst    HX HEENT      corneal tx    HX PACEMAKER      INS PPM/ICD LED DUAL ONLY  9/4/2014            Family History   Problem Relation Age of Onset    Alcohol abuse Brother        Social History   Patient resides    Independently    X  With family care      Assisted living      SNF      Ambulates    Independently      With cane     X  Assisted walker           Alcohol history   X  None     Social     Chronic     Smoking history  X  None     Former smoker     Current smoker     History   Smoking Status    Never Smoker   Smokeless Tobacco    Never Used           Drug history  X  None     Former drug user     Current drug user     Sexual history    Sexually active    X  Not sexually active     Code status    Full code   X  DNR/DNI     Partial    Code status discussed with the patient/caregivers. Review of Systems  See HPI    Physical Exam  Visit Vitals    /56    Pulse 77    Temp 97.7 °F (36.5 °C)    Resp 15    Ht 4' 11\" (1.499 m)    Wt 98 lb (44.5 kg)    LMP 03/05/1963    SpO2 91%    BMI 19.79 kg/m2        General: Frail, older woman, in no acute distress. Alert. Cooperative. Hard of hearing. Head: Normocephalic. Atraumatic. Eyes:  Conjunctiva pink. Sclera white. Near non-reactive pupils. Ears:  Ear canals patent. TM non-erythematous. Respiratory: CTAB. No w/r/r/c.   Cardiovascular: RRR. Normal S1,S2. No m/r/g. DP and radial pulses 2+ throughout. GI: + bowel sounds. Nontender. No rebound tenderness or guarding. Nondistended. Extremities: Trace pitting edema bilaterally. No tenderness. Musculoskeletal: Exam limited by pain. Neuro: Exam limited by pain. Skin: Clear. No rashes. No ulcers.  No ecchymosis   : Deferred   Rectal: Deferred       Laboratory Data  Recent Results (from the past 24 hour(s))   CBC WITH AUTOMATED DIFF    Collection Time: 07/28/17  2:43 PM   Result Value Ref Range    WBC 7.3 3.6 - 11.0 K/uL    RBC 4.24 3.80 - 5.20 M/uL    HGB 12.8 11.5 - 16.0 g/dL    HCT 37.8 35.0 - 47.0 %    MCV 89.2 80.0 - 99.0 FL    MCH 30.2 26.0 - 34.0 PG    MCHC 33.9 30.0 - 36.5 g/dL    RDW 14.6 (H) 11.5 - 14.5 %    PLATELET 238 811 - 510 K/uL    NEUTROPHILS 78 (H) 32 - 75 %    LYMPHOCYTES 13 12 - 49 %    MONOCYTES 8 5 - 13 %    EOSINOPHILS 1 0 - 7 %    BASOPHILS 0 0 - 1 %    ABS. NEUTROPHILS 5.7 1.8 - 8.0 K/UL    ABS. LYMPHOCYTES 0.9 0.8 - 3.5 K/UL    ABS. MONOCYTES 0.6 0.0 - 1.0 K/UL    ABS. EOSINOPHILS 0.1 0.0 - 0.4 K/UL    ABS. BASOPHILS 0.0 0.0 - 0.1 K/UL   METABOLIC PANEL, BASIC    Collection Time: 07/28/17  2:43 PM   Result Value Ref Range    Sodium 132 (L) 136 - 145 mmol/L    Potassium 4.1 3.5 - 5.1 mmol/L    Chloride 97 97 - 108 mmol/L    CO2 25 21 - 32 mmol/L    Anion gap 10 5 - 15 mmol/L    Glucose 215 (H) 65 - 100 mg/dL    BUN 16 6 - 20 MG/DL    Creatinine 0.72 0.55 - 1.02 MG/DL    BUN/Creatinine ratio 22 (H) 12 - 20      GFR est AA >60 >60 ml/min/1.73m2    GFR est non-AA >60 >60 ml/min/1.73m2    Calcium 8.8 8.5 - 10.1 MG/DL       Imaging  Right hip X-ray showed subcapital proximal femur fracture. EKG:  None    Assessment and Plan   Osei Bradshaw is a 80 y.o. female with a h/o TN, 3 degree AV block s/p pacer placement who is admitted for hip fracture. Hip Fracture - Fall likely 2/2 to misstep/miscommunication between caregiver and pt. Per son's report, pt was able to ambulate with a walker and do many ADL semi-independently. - Ortho consulted, appreciate recs  - Admit to tele  - 0.2mg IV diluadid PRN for severe pain, tylenol PRN for moderate pain  - Daily CBC and CMP    Glaucoma - Per children, only able to see shadows. History of failed cornea graft. - Continue home Dorzolamide-timolol  - Continue home brimonidine  - Continue home latanoprost    Hypothyroidism - TSH from  - Continue home synthroid 50mcg daily    3rd degree heart block s/p pacer - Stable. - On tele    Hypertension  - BP on admission 153/58.  At this time 135/56.  - Continuing home medications of labetalol 100mg and Norvasc 10mg. - Will continue to monitor at this time and readjust as BP's trend.     GERD - stable, not on home medicine. Pre-diabetes  - Last HgA1c on 9/16/14 was 6.2.   - Not on home medication  - Insulin Sliding Scale high sensitivity with AC&HS glucose checks. - Hypoglycemia protocols ordered. FEN/GI - Cardiac diet. Activity - Bedrest  DVT prophylaxis - Holding for now with possibility of surgery  GI prophylaxis -  Not indicated at this time  Disposition - Plan to d/c TBD. PT/OT for evaluation and recommendation    CODE STATUS:  DNR     Patient will be discussed with Dr. Anais King MD  [address] [place] [address] Problems  Date Reviewed: 6/14/2017          Codes Class Noted POA    Hip fracture, right [facility]) ICD-10-CM: S72.001A  ICD-9-CM: 820.8  7/28/2017 Unknown            [facility]  Medicine Residency Attending Addendum:  I saw and evaluated the patient, performing the key elements of the service. I discussed the findings, assessment and plan with the resident and agree with the resident's findings and plan as documented in the resident's note. The patient was seen on 7/29/17  Pt seen and examined with residents just after her surgery.   80 year ol lady who sustained a ground level fall with subsequent fracture to right hip    Vitals:    08/03/17 2300 08/03/17 2312 08/04/17 0407 08/04/17 0844   BP:  109/60 114/65 114/65   Pulse: 66 70 67 67   Resp:  16 16    Temp:  98.2 °F (36.8 °C) 98.4 °F (36.9 °C)    SpO2:  98% 97%    Weight:       Height:       LMP: 03/05/1963     Lungs-clear  Heart -s1s2 rrr      Assessment/Plan:   Right hip fracture  Appreciate ortho help  Will continue to manage other comorbidities    Hospital Problems  Date Reviewed: 6/14/2017          Codes Class Noted POA    Hyponatremia ICD-10-CM: E87.1  ICD-9-CM: 276.1  8/3/2017 No        * (Principal)Hip fracture, right ([facility] ICD-10-CM: S72.001A  ICD-9-CM: 820.8 7/28/2017 Yes        HTN (hypertension) ICD-10-CM: I10  ICD-9-CM: 401.9  2/15/2011 Yes    Overview Signed 5/28/2013  9:13 AM by Kathe Stallworth MD     Goal < 150 SBP             GERD (gastroesophageal reflux disease) ICD-10-CM: K21.9  ICD-9-CM: 530.81  2/15/2011 Yes    Overview Signed 4/3/2012  1:25 PM by Kathe Stallworth MD      2008             Hypothyroid ICD-10-CM: E03.9  ICD-9-CM: 244.9  2/15/2011 Yes

## 2017-07-28 NOTE — PROGRESS NOTES
Primary Nurse Flor Jameson and [facility] performed a dual skin assessment on this patient No impairment noted

## 2017-07-28 NOTE — ROUTINE PROCESS
TRANSFER - OUT REPORT:    Verbal report given to April RN on Sam Granados  being transferred to 18 for routine progression of care       Report consisted of patients Situation, Background, Assessment and   Recommendations(SBAR). Information from the following report(s) SBAR, ED Summary, [facility] and Recent Results was reviewed with the receiving nurse. Opportunity for questions and clarification was provided.

## 2017-07-28 NOTE — PROGRESS NOTES
[facility]: MED RECONCILIATION    Comments/Recommendations:    Med rec completed with patient's son who was able to translate for her   He had a medication list and stated that he takes care of all her medications    Medications removed:    · Prednisolone acetate 1% opthalmic suspension    Information obtained from: Family     Significant PMH/Disease States:   Past Medical History:   Diagnosis Date    Atrioventricular block, complete ([address] 2/15/2011    GERD (gastroesophageal reflux disease) 2/15/2011    HTN (hypertension) 2/15/2011    Hypercholesteremia     Hypothyroid 2/15/2011    Murmur     Osteoporosis     Other and unspecified hyperlipidemia 2/15/2011    Pacemaker     Syncope and collapse 2/15/2011    Thyroid disease        Chief Complaint for this Admission:   Chief Complaint   Patient presents with    Fall    Leg Pain       Allergies: Ace inhibitors; Cozaar [losartan]; Hydrochlorothiazide; Niaspan [niacin]; Pravastatin; Prilosec [omeprazole]; and Sular [nisoldipine]    Prior to Admission Medications:   Prior to Admission Medications   Prescriptions Last Dose Informant Patient Reported? Taking? amLODIPine (NORVASC) 10 mg tablet 7/27/2017 at 1800 Family Member No Yes   Sig: TAKE ONE TABLET BY MOUTH DAILY   bisacodyl (DULCOLAX, BISACODYL,) 5 mg EC tablet Unknown at Unknown time Family Member Yes No   Sig: Take 5 mg by mouth as needed for Constipation. brimonidine (ALPHAGAN P) 0.1 % ophthalmic solution 7/28/2017 at am Family Member Yes Yes   Sig: Administer 1 drop to both eyes two (2) times a day. dorzolamide-timolol (COSOPT) 2-0.5 % ophthalmic solution 7/28/2017 at am Family Member Yes Yes   Sig: Administer 1 drop to both eyes two (2) times a day. furosemide (LASIX) 20 mg tablet 7/28/2017 at am Family Member No Yes   Sig: Take 1 tablet by mouth daily.    labetalol (NORMODYNE) 100 mg tablet 7/28/2017 at am Family Member No Yes   Sig: TAKE ONE TABLET BY MOUTH EVERY 12 HOURS   latanoprost (Nathaly John) 0.005 % ophthalmic solution 7/27/2017 at pm Family Member Yes Yes   Sig: Administer 1 drop to both eyes nightly. levothyroxine (SYNTHROID) 50 mcg tablet 7/28/2017 at am Family Member No Yes   Sig: TAKE ONE TABLET BY MOUTH DAILY BEFORE BREAKFAST   omega-3 fatty acids-vitamin e (FISH OIL) 1,000 mg Cap 7/28/2017 at am Family Member Yes Yes   Sig: Take 1 capsule by mouth daily. therapeutic multivitamin (THERAGRAN) tablet 7/28/2017 at am Family Member Yes Yes   Sig: Take 1 Tab by mouth daily.         Thank you,    Deb Pineda 59: [phone]

## 2017-07-29 ENCOUNTER — ANESTHESIA (OUTPATIENT)
Dept: SURGERY | Age: 82
DRG: 470 | End: 2017-07-29
Payer: MEDICARE

## 2017-07-29 ENCOUNTER — APPOINTMENT (OUTPATIENT)
Dept: GENERAL RADIOLOGY | Age: 82
DRG: 470 | End: 2017-07-29
Attending: ORTHOPAEDIC SURGERY
Payer: MEDICARE

## 2017-07-29 LAB
ANION GAP BLD CALC-SCNC: 7 MMOL/L (ref 5–15)
BASOPHILS # BLD AUTO: 0 K/UL (ref 0–0.1)
BASOPHILS # BLD: 0 % (ref 0–1)
BUN SERPL-MCNC: 12 MG/DL (ref 6–20)
BUN/CREAT SERPL: 21 (ref 12–20)
CALCIUM SERPL-MCNC: 8.6 MG/DL (ref 8.5–10.1)
CHLORIDE SERPL-SCNC: 100 MMOL/L (ref 97–108)
CO2 SERPL-SCNC: 28 MMOL/L (ref 21–32)
CREAT SERPL-MCNC: 0.58 MG/DL (ref 0.55–1.02)
EOSINOPHIL # BLD: 0.2 K/UL (ref 0–0.4)
EOSINOPHIL NFR BLD: 3 % (ref 0–7)
ERYTHROCYTE [DISTWIDTH] IN BLOOD BY AUTOMATED COUNT: 14.3 % (ref 11.5–14.5)
EST. AVERAGE GLUCOSE BLD GHB EST-MCNC: 140 MG/DL
GLUCOSE BLD STRIP.AUTO-MCNC: 137 MG/DL (ref 65–100)
GLUCOSE SERPL-MCNC: 116 MG/DL (ref 65–100)
HBA1C MFR BLD: 6.5 % (ref 4.2–6.3)
HCT VFR BLD AUTO: 33.1 % (ref 35–47)
HGB BLD-MCNC: 11.5 G/DL (ref 11.5–16)
LYMPHOCYTES # BLD AUTO: 27 % (ref 12–49)
LYMPHOCYTES # BLD: 1.8 K/UL (ref 0.8–3.5)
MCH RBC QN AUTO: 30.3 PG (ref 26–34)
MCHC RBC AUTO-ENTMCNC: 34.7 G/DL (ref 30–36.5)
MCV RBC AUTO: 87.3 FL (ref 80–99)
MONOCYTES # BLD: 0.6 K/UL (ref 0–1)
MONOCYTES NFR BLD AUTO: 9 % (ref 5–13)
NEUTS SEG # BLD: 4.1 K/UL (ref 1.8–8)
NEUTS SEG NFR BLD AUTO: 61 % (ref 32–75)
PLATELET # BLD AUTO: 313 K/UL (ref 150–400)
POTASSIUM SERPL-SCNC: 3.7 MMOL/L (ref 3.5–5.1)
RBC # BLD AUTO: 3.79 M/UL (ref 3.8–5.2)
SERVICE CMNT-IMP: ABNORMAL
SODIUM SERPL-SCNC: 135 MMOL/L (ref 136–145)
WBC # BLD AUTO: 6.7 K/UL (ref 3.6–11)

## 2017-07-29 PROCEDURE — 74011250636 HC RX REV CODE- 250/636

## 2017-07-29 PROCEDURE — 77030018836 HC SOL IRR NACL ICUM -A: Performed by: ORTHOPAEDIC SURGERY

## 2017-07-29 PROCEDURE — 85025 COMPLETE CBC W/AUTO DIFF WBC: CPT | Performed by: FAMILY MEDICINE

## 2017-07-29 PROCEDURE — 65270000029 HC RM PRIVATE

## 2017-07-29 PROCEDURE — 77030002966 HC SUT PDS J&J -A: Performed by: ORTHOPAEDIC SURGERY

## 2017-07-29 PROCEDURE — 72170 X-RAY EXAM OF PELVIS: CPT

## 2017-07-29 PROCEDURE — 77030013079 HC BLNKT BAIR HGGR 3M -A: Performed by: ANESTHESIOLOGY

## 2017-07-29 PROCEDURE — 0QB70ZZ EXCISION OF LEFT UPPER FEMUR, OPEN APPROACH: ICD-10-PCS | Performed by: ORTHOPAEDIC SURGERY

## 2017-07-29 PROCEDURE — 74011000250 HC RX REV CODE- 250

## 2017-07-29 PROCEDURE — 77030008684 HC TU ET CUF COVD -B: Performed by: ANESTHESIOLOGY

## 2017-07-29 PROCEDURE — 77030002933 HC SUT MCRYL J&J -A: Performed by: ORTHOPAEDIC SURGERY

## 2017-07-29 PROCEDURE — 77030010507 HC ADH SKN DERMBND J&J -B: Performed by: ORTHOPAEDIC SURGERY

## 2017-07-29 PROCEDURE — 87205 SMEAR GRAM STAIN: CPT | Performed by: FAMILY MEDICINE

## 2017-07-29 PROCEDURE — 74011000272 HC RX REV CODE- 272: Performed by: ORTHOPAEDIC SURGERY

## 2017-07-29 PROCEDURE — 77030033067 HC SUT PDO STRATFX SPIR J&J -B: Performed by: ORTHOPAEDIC SURGERY

## 2017-07-29 PROCEDURE — 80048 BASIC METABOLIC PNL TOTAL CA: CPT | Performed by: FAMILY MEDICINE

## 2017-07-29 PROCEDURE — 88311 DECALCIFY TISSUE: CPT | Performed by: ORTHOPAEDIC SURGERY

## 2017-07-29 PROCEDURE — 88304 TISSUE EXAM BY PATHOLOGIST: CPT | Performed by: ORTHOPAEDIC SURGERY

## 2017-07-29 PROCEDURE — 74011250637 HC RX REV CODE- 250/637: Performed by: ORTHOPAEDIC SURGERY

## 2017-07-29 PROCEDURE — 74011250636 HC RX REV CODE- 250/636: Performed by: NURSE PRACTITIONER

## 2017-07-29 PROCEDURE — 76060000033 HC ANESTHESIA 1 TO 1.5 HR: Performed by: ORTHOPAEDIC SURGERY

## 2017-07-29 PROCEDURE — 87075 CULTR BACTERIA EXCEPT BLOOD: CPT | Performed by: FAMILY MEDICINE

## 2017-07-29 PROCEDURE — 88307 TISSUE EXAM BY PATHOLOGIST: CPT | Performed by: ORTHOPAEDIC SURGERY

## 2017-07-29 PROCEDURE — 83036 HEMOGLOBIN GLYCOSYLATED A1C: CPT | Performed by: FAMILY MEDICINE

## 2017-07-29 PROCEDURE — 36415 COLL VENOUS BLD VENIPUNCTURE: CPT | Performed by: FAMILY MEDICINE

## 2017-07-29 PROCEDURE — 77030003029 HC SUT VCRL J&J -B: Performed by: ORTHOPAEDIC SURGERY

## 2017-07-29 PROCEDURE — 77030026438 HC STYL ET INTUB CARD -A: Performed by: ANESTHESIOLOGY

## 2017-07-29 PROCEDURE — 74011250636 HC RX REV CODE- 250/636: Performed by: ORTHOPAEDIC SURGERY

## 2017-07-29 PROCEDURE — 76010000161 HC OR TIME 1 TO 1.5 HR INTENSV-TIER 1: Performed by: ORTHOPAEDIC SURGERY

## 2017-07-29 PROCEDURE — 76210000006 HC OR PH I REC 0.5 TO 1 HR: Performed by: ORTHOPAEDIC SURGERY

## 2017-07-29 PROCEDURE — 82962 GLUCOSE BLOOD TEST: CPT

## 2017-07-29 PROCEDURE — 77030011640 HC PAD GRND REM COVD -A: Performed by: ORTHOPAEDIC SURGERY

## 2017-07-29 PROCEDURE — 77030020788: Performed by: ORTHOPAEDIC SURGERY

## 2017-07-29 PROCEDURE — 74011000250 HC RX REV CODE- 250: Performed by: ORTHOPAEDIC SURGERY

## 2017-07-29 PROCEDURE — 74011250637 HC RX REV CODE- 250/637: Performed by: STUDENT IN AN ORGANIZED HEALTH CARE EDUCATION/TRAINING PROGRAM

## 2017-07-29 PROCEDURE — 87040 BLOOD CULTURE FOR BACTERIA: CPT | Performed by: FAMILY MEDICINE

## 2017-07-29 RX ORDER — ACETAMINOPHEN 500 MG
1000 TABLET ORAL EVERY 8 HOURS
Status: DISCONTINUED | OUTPATIENT
Start: 2017-07-29 | End: 2017-08-04 | Stop reason: HOSPADM

## 2017-07-29 RX ORDER — ENOXAPARIN SODIUM 100 MG/ML
40 INJECTION SUBCUTANEOUS DAILY
Status: DISCONTINUED | OUTPATIENT
Start: 2017-07-30 | End: 2017-07-29 | Stop reason: DRUGHIGH

## 2017-07-29 RX ORDER — CEFAZOLIN SODIUM IN 0.9 % NACL 2 G/50 ML
2 INTRAVENOUS SOLUTION, PIGGYBACK (ML) INTRAVENOUS EVERY 8 HOURS
Status: DISCONTINUED | OUTPATIENT
Start: 2017-07-29 | End: 2017-08-03

## 2017-07-29 RX ORDER — OXYCODONE HYDROCHLORIDE 5 MG/1
2.5 TABLET ORAL
Status: DISCONTINUED | OUTPATIENT
Start: 2017-07-29 | End: 2017-08-04 | Stop reason: HOSPADM

## 2017-07-29 RX ORDER — POLYETHYLENE GLYCOL 3350 17 G/17G
17 POWDER, FOR SOLUTION ORAL DAILY
Status: DISCONTINUED | OUTPATIENT
Start: 2017-07-30 | End: 2017-08-04 | Stop reason: HOSPADM

## 2017-07-29 RX ORDER — SODIUM CHLORIDE 0.9 % (FLUSH) 0.9 %
5-10 SYRINGE (ML) INJECTION EVERY 8 HOURS
Status: DISCONTINUED | OUTPATIENT
Start: 2017-07-30 | End: 2017-08-04 | Stop reason: HOSPADM

## 2017-07-29 RX ORDER — SODIUM CHLORIDE 9 MG/ML
125 INJECTION, SOLUTION INTRAVENOUS CONTINUOUS
Status: DISPENSED | OUTPATIENT
Start: 2017-07-29 | End: 2017-07-30

## 2017-07-29 RX ORDER — FACIAL-BODY WIPES
10 EACH TOPICAL DAILY PRN
Status: DISCONTINUED | OUTPATIENT
Start: 2017-07-31 | End: 2017-08-04 | Stop reason: HOSPADM

## 2017-07-29 RX ORDER — DEXAMETHASONE SODIUM PHOSPHATE 4 MG/ML
INJECTION, SOLUTION INTRA-ARTICULAR; INTRALESIONAL; INTRAMUSCULAR; INTRAVENOUS; SOFT TISSUE AS NEEDED
Status: DISCONTINUED | OUTPATIENT
Start: 2017-07-29 | End: 2017-07-29 | Stop reason: HOSPADM

## 2017-07-29 RX ORDER — FENTANYL CITRATE 50 UG/ML
INJECTION, SOLUTION INTRAMUSCULAR; INTRAVENOUS AS NEEDED
Status: DISCONTINUED | OUTPATIENT
Start: 2017-07-29 | End: 2017-07-29 | Stop reason: HOSPADM

## 2017-07-29 RX ORDER — CEFAZOLIN SODIUM IN 0.9 % NACL 2 G/50 ML
2 INTRAVENOUS SOLUTION, PIGGYBACK (ML) INTRAVENOUS EVERY 8 HOURS
Status: DISCONTINUED | OUTPATIENT
Start: 2017-07-29 | End: 2017-07-29 | Stop reason: SDUPTHER

## 2017-07-29 RX ORDER — AMOXICILLIN 250 MG
1 CAPSULE ORAL 2 TIMES DAILY
Status: DISCONTINUED | OUTPATIENT
Start: 2017-07-29 | End: 2017-08-04 | Stop reason: HOSPADM

## 2017-07-29 RX ORDER — ENOXAPARIN SODIUM 100 MG/ML
30 INJECTION SUBCUTANEOUS DAILY
Status: DISCONTINUED | OUTPATIENT
Start: 2017-07-30 | End: 2017-08-04 | Stop reason: HOSPADM

## 2017-07-29 RX ORDER — LIDOCAINE HYDROCHLORIDE 20 MG/ML
INJECTION, SOLUTION EPIDURAL; INFILTRATION; INTRACAUDAL; PERINEURAL AS NEEDED
Status: DISCONTINUED | OUTPATIENT
Start: 2017-07-29 | End: 2017-07-29 | Stop reason: HOSPADM

## 2017-07-29 RX ORDER — PROPOFOL 10 MG/ML
INJECTION, EMULSION INTRAVENOUS AS NEEDED
Status: DISCONTINUED | OUTPATIENT
Start: 2017-07-29 | End: 2017-07-29 | Stop reason: HOSPADM

## 2017-07-29 RX ORDER — NALOXONE HYDROCHLORIDE 0.4 MG/ML
0.4 INJECTION, SOLUTION INTRAMUSCULAR; INTRAVENOUS; SUBCUTANEOUS AS NEEDED
Status: DISCONTINUED | OUTPATIENT
Start: 2017-07-29 | End: 2017-08-04 | Stop reason: HOSPADM

## 2017-07-29 RX ORDER — SODIUM CHLORIDE 0.9 % (FLUSH) 0.9 %
5-10 SYRINGE (ML) INJECTION AS NEEDED
Status: DISCONTINUED | OUTPATIENT
Start: 2017-07-29 | End: 2017-08-04 | Stop reason: HOSPADM

## 2017-07-29 RX ORDER — ROCURONIUM BROMIDE 10 MG/ML
INJECTION, SOLUTION INTRAVENOUS AS NEEDED
Status: DISCONTINUED | OUTPATIENT
Start: 2017-07-29 | End: 2017-07-29 | Stop reason: HOSPADM

## 2017-07-29 RX ORDER — SODIUM CHLORIDE, SODIUM LACTATE, POTASSIUM CHLORIDE, CALCIUM CHLORIDE 600; 310; 30; 20 MG/100ML; MG/100ML; MG/100ML; MG/100ML
INJECTION, SOLUTION INTRAVENOUS
Status: DISCONTINUED | OUTPATIENT
Start: 2017-07-29 | End: 2017-07-29 | Stop reason: HOSPADM

## 2017-07-29 RX ORDER — FERROUS SULFATE, DRIED 160(50) MG
1 TABLET, EXTENDED RELEASE ORAL
Status: DISCONTINUED | OUTPATIENT
Start: 2017-07-30 | End: 2017-08-04 | Stop reason: HOSPADM

## 2017-07-29 RX ORDER — OXYCODONE HYDROCHLORIDE 5 MG/1
5 TABLET ORAL
Status: DISCONTINUED | OUTPATIENT
Start: 2017-07-29 | End: 2017-08-04 | Stop reason: HOSPADM

## 2017-07-29 RX ORDER — SUCCINYLCHOLINE CHLORIDE 20 MG/ML
INJECTION INTRAMUSCULAR; INTRAVENOUS AS NEEDED
Status: DISCONTINUED | OUTPATIENT
Start: 2017-07-29 | End: 2017-07-29 | Stop reason: HOSPADM

## 2017-07-29 RX ORDER — HYDROMORPHONE HYDROCHLORIDE 1 MG/ML
0.5 INJECTION, SOLUTION INTRAMUSCULAR; INTRAVENOUS; SUBCUTANEOUS
Status: ACTIVE | OUTPATIENT
Start: 2017-07-29 | End: 2017-07-30

## 2017-07-29 RX ADMIN — BRIMONIDINE TARTRATE 1 DROP: 1 SOLUTION/ DROPS OPHTHALMIC at 08:41

## 2017-07-29 RX ADMIN — LEVOTHYROXINE SODIUM 50 MCG: 0.05 TABLET ORAL at 07:30

## 2017-07-29 RX ADMIN — AMLODIPINE BESYLATE 10 MG: 5 TABLET ORAL at 08:37

## 2017-07-29 RX ADMIN — Medication 10 ML: at 14:55

## 2017-07-29 RX ADMIN — ACETAMINOPHEN 650 MG: 325 TABLET ORAL at 08:40

## 2017-07-29 RX ADMIN — DORZOLAMIDE HYDROCHLORIDE AND TIMOLOL MALEATE 1 DROP: 20; 5 SOLUTION/ DROPS OPHTHALMIC at 08:41

## 2017-07-29 RX ADMIN — LIDOCAINE HYDROCHLORIDE 60 MG: 20 INJECTION, SOLUTION EPIDURAL; INFILTRATION; INTRACAUDAL; PERINEURAL at 11:19

## 2017-07-29 RX ADMIN — PROPOFOL 10 MG: 10 INJECTION, EMULSION INTRAVENOUS at 12:36

## 2017-07-29 RX ADMIN — OXYCODONE HYDROCHLORIDE 5 MG: 5 TABLET ORAL at 22:11

## 2017-07-29 RX ADMIN — CEFAZOLIN 2 G: 1 INJECTION, POWDER, FOR SOLUTION INTRAMUSCULAR; INTRAVENOUS; PARENTERAL at 18:39

## 2017-07-29 RX ADMIN — LATANOPROST 1 DROP: 50 SOLUTION OPHTHALMIC at 20:44

## 2017-07-29 RX ADMIN — FENTANYL CITRATE 50 MCG: 50 INJECTION, SOLUTION INTRAMUSCULAR; INTRAVENOUS at 11:19

## 2017-07-29 RX ADMIN — BRIMONIDINE TARTRATE 1 DROP: 1 SOLUTION/ DROPS OPHTHALMIC at 20:44

## 2017-07-29 RX ADMIN — ACETAMINOPHEN 1000 MG: 500 TABLET ORAL at 14:54

## 2017-07-29 RX ADMIN — SODIUM CHLORIDE 125 ML/HR: 900 INJECTION, SOLUTION INTRAVENOUS at 23:46

## 2017-07-29 RX ADMIN — LABETALOL HCL 100 MG: 100 TABLET, FILM COATED ORAL at 08:40

## 2017-07-29 RX ADMIN — PROPOFOL 10 MG: 10 INJECTION, EMULSION INTRAVENOUS at 12:37

## 2017-07-29 RX ADMIN — DORZOLAMIDE HYDROCHLORIDE AND TIMOLOL MALEATE 1 DROP: 20; 5 SOLUTION/ DROPS OPHTHALMIC at 20:44

## 2017-07-29 RX ADMIN — SODIUM CHLORIDE, SODIUM LACTATE, POTASSIUM CHLORIDE, CALCIUM CHLORIDE: 600; 310; 30; 20 INJECTION, SOLUTION INTRAVENOUS at 11:53

## 2017-07-29 RX ADMIN — Medication 10 ML: at 06:42

## 2017-07-29 RX ADMIN — PROPOFOL 70 MG: 10 INJECTION, EMULSION INTRAVENOUS at 11:19

## 2017-07-29 RX ADMIN — ROCURONIUM BROMIDE 3 MG: 10 INJECTION, SOLUTION INTRAVENOUS at 11:19

## 2017-07-29 RX ADMIN — SUCCINYLCHOLINE CHLORIDE 60 MG: 20 INJECTION INTRAMUSCULAR; INTRAVENOUS at 11:19

## 2017-07-29 RX ADMIN — DEXAMETHASONE SODIUM PHOSPHATE 4 MG: 4 INJECTION, SOLUTION INTRA-ARTICULAR; INTRALESIONAL; INTRAMUSCULAR; INTRAVENOUS; SOFT TISSUE at 12:01

## 2017-07-29 RX ADMIN — SODIUM CHLORIDE 125 ML/HR: 900 INJECTION, SOLUTION INTRAVENOUS at 13:56

## 2017-07-29 RX ADMIN — LABETALOL HCL 100 MG: 100 TABLET, FILM COATED ORAL at 18:42

## 2017-07-29 RX ADMIN — PROPOFOL 10 MG: 10 INJECTION, EMULSION INTRAVENOUS at 12:45

## 2017-07-29 RX ADMIN — CEFAZOLIN 2 G: 1 INJECTION, POWDER, FOR SOLUTION INTRAMUSCULAR; INTRAVENOUS; PARENTERAL at 11:30

## 2017-07-29 RX ADMIN — SODIUM CHLORIDE, SODIUM LACTATE, POTASSIUM CHLORIDE, CALCIUM CHLORIDE: 600; 310; 30; 20 INJECTION, SOLUTION INTRAVENOUS at 11:12

## 2017-07-29 RX ADMIN — ACETAMINOPHEN 1000 MG: 500 TABLET ORAL at 20:44

## 2017-07-29 NOTE — PERIOP NOTES
TRANSFER - OUT REPORT:    Verbal report given to Jett Rowan RN(name) on Nereyda Grier  being transferred to [facility](unit) for routine progression of care       Report consisted of patients Situation, Background, Assessment and   Recommendations(SBAR). Information from the following report(s) SBAR, Kardex, Procedure Summary, Intake/Output, MAR and Cardiac Rhythm paced was reviewed with the receiving nurse. Lines:   Peripheral IV 07/28/17 Right Wrist (Active)   Site Assessment Clean, dry, & intact 7/29/2017  1:00 PM   Phlebitis Assessment 0 7/29/2017  1:00 PM   Infiltration Assessment 0 7/29/2017  1:00 PM   Dressing Status Clean, dry, & intact 7/29/2017  1:00 PM   Dressing Type Transparent;Tape 7/29/2017  1:00 PM   Hub Color/Line Status Blue;Flushed;Patent 7/29/2017  1:00 PM   Action Taken Blood drawn 7/28/2017  2:45 PM   Alcohol Cap Used Yes 7/28/2017  8:00 PM       Peripheral IV 07/29/17 Left Antecubital (Active)   Site Assessment Clean, dry, & intact 7/29/2017  1:00 PM   Phlebitis Assessment 0 7/29/2017  1:00 PM   Infiltration Assessment 0 7/29/2017  1:00 PM   Dressing Status Clean, dry, & intact 7/29/2017  1:00 PM   Dressing Type Tape;Transparent 7/29/2017  1:00 PM   Hub Color/Line Status Pink;Patent; Infusing 7/29/2017  1:00 PM        Opportunity for questions and clarification was provided.       Patient transported with:   O2 @ 2 liters

## 2017-07-29 NOTE — PROGRESS NOTES
[facility] Dosing Services: 7/29/2017    Enoxaparin by Dr. Stark Numbers made for this 80 y.o. female, for prophylaxis. Wt Readings from Last 1 Encounters:   07/28/17 44.5 kg (98 lb)       Ht Readings from Last 1 Encounters:   07/28/17 149.9 cm (59\")      Previous Dose 40 mg daily   Creatinine Clearance Estimated Creatinine Clearance: 41.7 mL/min (based on Cr of 0.58). Creatinine Lab Results   Component Value Date/Time    Creatinine 0.58 07/29/2017 01:13 AM    Creatinine (POC) 0.8 10/11/2009 12:42 PM       Platelet Lab Results   Component Value Date/Time    PLATELET 459 97/65/2874 01:13 AM      H/H Lab Results   Component Value Date/Time    HGB 11.5 07/29/2017 01:13 AM        Pharmacist made change to enoxaparin therapy based on:  [ X ] Low Body Weight: dose changed to: 30 mg daily  Female 35-45 kg  - Pharmacy to automatically make dose adjustment for renal dysfunction (creatinine clearance less than 30 mL/min)  - Pharmacy to automatically make dose adjustment for Under Weight Patients  - Pharmacy to make dose rounding adjustments per [facility] dose adjustment scale. Pharmacy to monitor patients progress. Will make dose adjustment as needed per changing renal function. Will communicate further recommendations regarding patients anticoagulation therapy with prescriber. Signed Diandra Brunson .  Contact information: [phone]

## 2017-07-29 NOTE — PROGRESS NOTES
Esme Bansal is a 80 y.o. female, who is HD# 2 with R displaced femoral neck fx  PLAN    1. NPO  2. Bedrest  3. Pain control  4. OR today      Mattie Bragg MD  Office : [phone]  Cell: [phone]     Subjective:     No events       Objective  Objective:   Patient Vitals for the past 12 hrs:   BP Temp Pulse Resp SpO2   07/29/17 0721 138/71 97.6 °F (36.4 °C) 80 16 96 %   07/29/17 0657 - - 79 - -   07/29/17 0419 148/68 98.1 °F (36.7 °C) 69 16 97 %   07/28/17 2325 133/61 98.2 °F (36.8 °C) 68 18 100 %   07/28/17 2313 - - 68 - -       GENERAL: No acute distress. Alert and oriented. Well nourished and well hydrated. Appears stated age. HEENT : Normocephalic, atraumatic. Extraocular movements intact. Mucous membranes moist    NECK: Supple, trachea midline. LUNGS: Adequate and symmetric respiratory effort. No intercostal retractions or accessory muscle use. HEART: Extremities warm and perfused. ABDOMEN: Soft, nontender, nondistended, no guarding. SKIN: Warm and dry, no rash. MUSCULOSKELETAL EXAM  RLE - Sensate, motor/sensory exam deferred  LABS :  Recent Results (from the past 12 hour(s))   CBC WITH AUTOMATED DIFF    Collection Time: 07/29/17  1:13 AM   Result Value Ref Range    WBC 6.7 3.6 - 11.0 K/uL    RBC 3.79 (L) 3.80 - 5.20 M/uL    HGB 11.5 11.5 - 16.0 g/dL    HCT 33.1 (L) 35.0 - 47.0 %    MCV 87.3 80.0 - 99.0 FL    MCH 30.3 26.0 - 34.0 PG    MCHC 34.7 30.0 - 36.5 g/dL    RDW 14.3 11.5 - 14.5 %    PLATELET 766 161 - 356 K/uL    NEUTROPHILS 61 32 - 75 %    LYMPHOCYTES 27 12 - 49 %    MONOCYTES 9 5 - 13 %    EOSINOPHILS 3 0 - 7 %    BASOPHILS 0 0 - 1 %    ABS. NEUTROPHILS 4.1 1.8 - 8.0 K/UL    ABS. LYMPHOCYTES 1.8 0.8 - 3.5 K/UL    ABS. MONOCYTES 0.6 0.0 - 1.0 K/UL    ABS. EOSINOPHILS 0.2 0.0 - 0.4 K/UL    ABS.  BASOPHILS 0.0 0.0 - 0.1 K/UL   METABOLIC PANEL, BASIC    Collection Time: 07/29/17  1:13 AM   Result Value Ref Range    Sodium 135 (L) 136 - 145 mmol/L    Potassium 3.7 3.5 - 5.1 mmol/L    Chloride 100 97 - 108 mmol/L    CO2 28 21 - 32 mmol/L    Anion gap 7 5 - 15 mmol/L    Glucose 116 (H) 65 - 100 mg/dL    BUN 12 6 - 20 MG/DL    Creatinine 0.58 0.55 - 1.02 MG/DL    BUN/Creatinine ratio 21 (H) 12 - 20      GFR est AA >60 >60 ml/min/1.73m2    GFR est non-AA >60 >60 ml/min/1.73m2    Calcium 8.6 8.5 - 10.1 MG/DL   HEMOGLOBIN A1C WITH EAG    Collection Time: 07/29/17  6:30 AM   Result Value Ref Range    Hemoglobin A1c 6.5 (H) 4.2 - 6.3 %    Est. average glucose 140 mg/dL   GLUCOSE, POC    Collection Time: 07/29/17  7:19 AM   Result Value Ref Range    Glucose (POC) 137 (H) 65 - 100 mg/dL    Performed by JAYSON DE LOS SANTOS (PCT)        All Micro Results     None

## 2017-07-29 NOTE — PROGRESS NOTES
Brief Resident Note    Subjective  Patient examined post operatively. She is a 81 yo female admitted for right subcapital proximal femur fracture. She went to the OR today for planned surgical intervention/replacement; however, discovered to have concern for infection, so no replacement was done. Per report from PACU nursing, hip was irrigated, pathologies sent. Patient has been stable post operatively, VSS wise. She denies pain during my examination. No problem breathing. When asked if she feels okay she nods her head yes. Objective     Visit Vitals    /57    Pulse 61    Temp 97.2 °F (36.2 °C)    Resp 12    Ht 4' 11\" (1.499 m)    Wt 98 lb (44.5 kg)    SpO2 99%    BMI 19.79 kg/m2     Gen: Alert, NAD, laying in bed  Heart: normal rate, regular rhythm. No murmurs auscultated  Lungs: Clear to auscultation anteriorly. Non labored breathing, NC in place  Abdomen: non tender  Extremities: bandage seen on right hip, no drainage. No surrounding erythema. DP 2+ bilaterally. Assessment and Plan  81 yo female s/p right subcapital proximal femur fracture s/p surgical debridement today. - Seems like possible concern for infection of joint given findings during surgery? Surgeon report pending. Will follow up. - Anaerobic culture sent   - Wound culture sent  - Will order blood culture to be cautious, but again patients VS have been stable over past 24h and no leukocytosis   - Will continue to closely monitor and pos operative period    Connie Salmon MD  07/29/17  1:32 PM  I reviewed the patient's medical history, the resident's findings on physical examination, the patient's diagnoses, and treatment plan as documented in the resident note. I concur with the treatment plan as documented. Additional suggestions noted.

## 2017-07-29 NOTE — PROGRESS NOTES
Orthopedic & Surgical Nursing Communication Tool      7:37 AM  7/29/2017     Bedside and Verbal shift change report given to Judith Doe RN (incoming nurse) by Ramana Ravi RN (outgoing nurse) on Trav Hutchinson a 80 y.o. female who was admitted on 7/28/2017  1:28 PM. Report included the following information SBAR, Kardex, Intake/Output, MAR and Recent Results. Opportunity for questions and clarifications were given to the incoming nurse. Patient's bed is in low position, side rails x2, door open PRN, call bell within reach and patient not in distress.     Ramana Ravi RN

## 2017-07-29 NOTE — ANESTHESIA PREPROCEDURE EVALUATION
Anesthetic History   No history of anesthetic complications            Review of Systems / Medical History  Patient summary reviewed, nursing notes reviewed and pertinent labs reviewed    Pulmonary  Within defined limits                 Neuro/Psych   Within defined limits           Cardiovascular  Within defined limits  Hypertension          Pacemaker         GI/Hepatic/Renal  Within defined limits   GERD           Endo/Other  Within defined limits    Hypothyroidism       Other Findings              Physical Exam    Airway  Mallampati: II  TM Distance: 4 - 6 cm  Neck ROM: normal range of motion   Mouth opening: Normal     Cardiovascular    Rhythm: regular  Rate: normal         Dental    Dentition: Edentulous     Pulmonary  Breath sounds clear to auscultation               Abdominal         Other Findings            Anesthetic Plan    ASA: 4  Anesthesia type: general            Anesthetic plan and risks discussed with: Family

## 2017-07-29 NOTE — PROGRESS NOTES
Occupational therapy note:  Orders received, chart reviewed. Patient with recent fall and subsequent R femoral neck fracture. Patient with bedrest orders, and plan for surgical intervention. Will follow up with patient post operatively for OT evaluation and plan of care. Jud Yan MS OTR/L

## 2017-07-29 NOTE — CONSULTS
[facility]   [address] [facility], [address]   [address] [facility]       Name:  Iliana Salazar   MR#:  [id number]   :  1923   Account #:  [de-identified]    Date of Consultation:  2017   Date of Adm:  2017       REFERRING PHYSICIAN: Butch Baxter MD    REASON FOR CONSULTATION: Hip fracture. HISTORY OF PRESENT ILLNESS: The patient is a pleasant 80-year-  old [place] female who had a fall at home 2 days ago. She has   been able to ambulate with a walker since that time; however, today   was unable to walk secondary to severe right hip pain. She was   brought to the emergency department, found to have a hip fracture. She has been admitted to the hospitalist service. We were consulted   for appropriate further stages of care. Of note, she is hard of hearing and does not speak [place], though   she does understand some per her son. She walks with a walker. PAST MEDICAL HISTORY: Notable for GERD, hypertension,   hypercholesterolemia, hypothyroid, osteoporosis, thyroid disease and   an AV block. MEDICATION ALLERGIES:  Per admission medication reconciliation. She does not take any blood thinners. SOCIAL HISTORY: She lives with her son. She does not smoke. REVIEW OF SYSTEMS   Unable to obtain secondary to language barrier. PHYSICAL EXAMINATION   VITAL SIGNS: Stable. She is afebrile. GENERAL: She is alert and awake. She does not give any verbal   history. Breathing is unlabored. Affect is appropriate. She appears   comfortable. EXTREMITIES: Examination of bilateral extremities demonstrates her   right leg is shortened and externally rotated. She is able to plantarflex   and dorsiflex. Grossly sensate. Further range of motion deferred. Other side   unremarkable. Imaging of her pelvis and right hip are available for   review, demonstrating a displaced right femoral neck fracture. No   evidence of lucent lesion.  It does appear that the fracture may have   been impacted. She may have been bearing weight as the neck is   impacted on the head, superiorly. LABORATORY DATA: Notable for white blood cell count of 7.3,   hemoglobin 12.8, hematocrit 37.8, platelets are 836. BUN is 16,   creatinine 0.72.    ASSESSMENT: A 80year-old female with a displaced right femoral   neck fracture. PLAN: We discussed treatment options with the patient and 2 of her   brothers including the power of , Erlinda Lima. We discussed the   benefits of surgery, avoiding as well a long-term immobilization   associated with nonoperative treatment. He would like to move forward   with surgery. We discussed risks of bleeding, infection, damage to   surrounding nerves and blood vessels, fracture, dislocation, need for   further surgery, persistent pain and stiffness as well as other   anesthesia risks. They understand this and are interested in surgery. We will plan for this tomorrow pending medicine clearance. N.p.o. after midnight.         Johny Sutton MD      AB / SHAHANA   D:  07/28/2017   19:55   T:  07/28/2017   20:22   Job #:  [id number]

## 2017-07-29 NOTE — ANESTHESIA POSTPROCEDURE EVALUATION
Post-Anesthesia Evaluation and Assessment    Patient: Edith Quinones MRN: [id number]  SSN: [id number]    YOB: 1923  Age: 80 y.o. Sex: female       Cardiovascular Function/Vital Signs  Visit Vitals    /57    Pulse 61    Temp 36.2 °C (97.2 °F)    Resp 12    Ht 4' 11\" (1.499 m)    Wt 44.5 kg (98 lb)    SpO2 99%    BMI 19.79 kg/m2       Patient is status post general anesthesia for Procedure(s):  RIGHT HIP HEMIARTHROPLASTY/BIOMET. Nausea/Vomiting: None    Postoperative hydration reviewed and adequate. Pain:  Pain Scale 1: Visual (07/29/17 1300)  Pain Intensity 1: 0 (07/29/17 1300)   Managed    Neurological Status:   Neuro (WDL): Exceptions to WDL (07/29/17 1300)  Neuro  Neurologic State: Drowsy; Anesthetized (07/29/17 1300)  Cognition: Follows commands (07/28/17 1959)  Speech: Clear (07/28/17 1959)  LUE Motor Response: Purposeful (07/28/17 1959)  LLE Motor Response: Weak (07/28/17 1959)  RUE Motor Response: Purposeful (07/28/17 1959)  RLE Motor Response: Weak (07/29/17 1300)   At baseline    Mental Status and Level of Consciousness: Arousable    Pulmonary Status:   O2 Device: Nasal cannula (07/29/17 1300)   Adequate oxygenation and airway patent    Complications related to anesthesia: None    Post-anesthesia assessment completed.  No concerns    Signed By: Nathanael Caldwell MD     July 29, 2017

## 2017-07-29 NOTE — PROGRESS NOTES
[facility]   Daily Progress Note    Date: 7/29/2017    Assessment/Plan: Osei Bradshaw is a 80 y.o. female with a h/o TN, 3 degree AV block s/p pacer placement who is admitted for hip fracture. 24 Hour Events: Hip repair today. Hip Fracture - Fall likely 2/2 to misstep/miscommunication between caregiver and pt. Per son's report, pt was able to ambulate with a walker and do many ADL semi-independently. - Ortho following, surgery today  - Admit to tele  - 0.2mg IV diluadid PRN for severe pain, tylenol PRN for moderate pain  - Daily CBC and CMP     Glaucoma - Per children, only able to see shadows. History of failed cornea graft. - Continue home Dorzolamide-timolol  - Continue home brimonidine  - Continue home latanoprost     Hypothyroidism - TSH from  - Continue home synthroid 50mcg daily     3rd degree heart block s/p pacer - Stable. - On tele     Hypertension  - BP on admission 153/58. At this time 135/56.  - Continuing home medications of labetalol 100mg and Norvasc 10mg. - Will continue to monitor at this time and readjust as BP's trend.      GERD - stable, not on home medicine.     Pre-diabetes  - A1c 6.5, stable. - Not on medications      FEN/GI - Cardiac diet. Activity - Bedrest  DVT prophylaxis - Holding for now with possibility of surgery  GI prophylaxis -  Not indicated at this time  Disposition - Plan to d/c TBD. PT/OT for evaluation and recommendation     CODE STATUS:  DNR  Patient was discussed with Dr. Keith Mendoza. Guera Rodriguez MD  Family Medicine Resident  7/29/2017 11:11 AM         CC: Unable to obtain    Subjective  Per son, she did fine overnight. She had some pain which she was given two tylenol.       Inpatient Medications  Current Facility-Administered Medications   Medication Dose Route Frequency    amLODIPine (NORVASC) tablet 10 mg  10 mg Oral DAILY    brimonidine (ALPHAGAN P) 0.1 % ophthalmic solution 1 Drop  1 Drop Both Eyes BID  dorzolamide-timolol (COSOPT) 22.3-6.8 mg/mL ophthalmic solution 1 Drop  1 Drop Both Eyes BID    labetalol (NORMODYNE) tablet 100 mg  100 mg Oral BID    latanoprost (XALATAN) 0.005 % ophthalmic solution 1 Drop  1 Drop Both Eyes QHS    levothyroxine (SYNTHROID) tablet 50 mcg  50 mcg Oral ACB    glucose chewable tablet 16 g  4 Tab Oral PRN    dextrose (D50W) injection syrg 12.5-25 g  12.5-25 g IntraVENous PRN    glucagon (GLUCAGEN) injection 1 mg  1 mg IntraMUSCular PRN    sodium chloride (NS) flush 5-10 mL  5-10 mL IntraVENous Q8H    sodium chloride (NS) flush 5-10 mL  5-10 mL IntraVENous PRN    acetaminophen (TYLENOL) tablet 650 mg  650 mg Oral Q4H PRN    HYDROmorphone (PF) (DILAUDID) injection 0.2 mg  0.2 mg IntraVENous Q4H PRN    ceFAZolin in 0.9% NS (ANCEF) IVPB soln 2 g  2 g IntraVENous ON CALL TO OR         Allergies  Allergies   Allergen Reactions    Ace Inhibitors Cough    Cozaar [Losartan] Angioedema    Hydrochlorothiazide Other (comments) and Vertigo     Weak in legs, HYPONATREMIA (109 - Sept 2014 admission)    Niaspan [Niacin] Nausea Only    Pravastatin Other (comments)     dizzy    Prilosec [Omeprazole] Myalgia    Sular [Nisoldipine] Vertigo         Objective  Vitals:  Patient Vitals for the past 8 hrs:   Temp Pulse Resp BP SpO2   07/29/17 1102 98.6 °F (37 °C) 72 16 118/53 98 %   07/29/17 0721 97.6 °F (36.4 °C) 80 16 138/71 96 %   07/29/17 0657 - 79 - - -   07/29/17 0419 98.1 °F (36.7 °C) 69 16 148/68 97 %         I/O:  No intake or output data in the 24 hours ending 07/29/17 1111  Last shift:       Last 3 shifts:         Physical Exam:  General: No acute distress. Alert. Cooperative. HEENT: Normocephalic. Atraumatic. Conjunctiva pink. Sclera white. PERRL. MMM   Respiratory: CTAB. No w/r/r/c.   Cardiovascular: RRR. Normal S1,S2. No m/r/g. 2+ pulses in DP bilaterally   GI: + bowel sounds. Nontender. No rebound tenderness or guarding. Nondistended   Extremities: No edema.  No tenderness. Bilaterally deisy boots. Laboratory Data  Recent Results (from the past 12 hour(s))   CBC WITH AUTOMATED DIFF    Collection Time: 07/29/17  1:13 AM   Result Value Ref Range    WBC 6.7 3.6 - 11.0 K/uL    RBC 3.79 (L) 3.80 - 5.20 M/uL    HGB 11.5 11.5 - 16.0 g/dL    HCT 33.1 (L) 35.0 - 47.0 %    MCV 87.3 80.0 - 99.0 FL    MCH 30.3 26.0 - 34.0 PG    MCHC 34.7 30.0 - 36.5 g/dL    RDW 14.3 11.5 - 14.5 %    PLATELET 003 117 - 776 K/uL    NEUTROPHILS 61 32 - 75 %    LYMPHOCYTES 27 12 - 49 %    MONOCYTES 9 5 - 13 %    EOSINOPHILS 3 0 - 7 %    BASOPHILS 0 0 - 1 %    ABS. NEUTROPHILS 4.1 1.8 - 8.0 K/UL    ABS. LYMPHOCYTES 1.8 0.8 - 3.5 K/UL    ABS. MONOCYTES 0.6 0.0 - 1.0 K/UL    ABS. EOSINOPHILS 0.2 0.0 - 0.4 K/UL    ABS. BASOPHILS 0.0 0.0 - 0.1 K/UL   METABOLIC PANEL, BASIC    Collection Time: 07/29/17  1:13 AM   Result Value Ref Range    Sodium 135 (L) 136 - 145 mmol/L    Potassium 3.7 3.5 - 5.1 mmol/L    Chloride 100 97 - 108 mmol/L    CO2 28 21 - 32 mmol/L    Anion gap 7 5 - 15 mmol/L    Glucose 116 (H) 65 - 100 mg/dL    BUN 12 6 - 20 MG/DL    Creatinine 0.58 0.55 - 1.02 MG/DL    BUN/Creatinine ratio 21 (H) 12 - 20      GFR est AA >60 >60 ml/min/1.73m2    GFR est non-AA >60 >60 ml/min/1.73m2    Calcium 8.6 8.5 - 10.1 MG/DL   HEMOGLOBIN A1C WITH EAG    Collection Time: 07/29/17  6:30 AM   Result Value Ref Range    Hemoglobin A1c 6.5 (H) 4.2 - 6.3 %    Est. average glucose 140 mg/dL   GLUCOSE, POC    Collection Time: 07/29/17  7:19 AM   Result Value Ref Range    Glucose (POC) 137 (H) 65 - 100 mg/dL    Performed by Karey Porter (PCT)      Labs pertinent for       Imaging  No new. Hospital Problems:  Hospital Problems  Date Reviewed: 6/14/2017          Codes Class Noted POA    Hip fracture, right [facility]) ICD-10-CM: S72.001A  ICD-9-CM: 820.8  7/28/2017 Unknown            [address] Medicine Residency Attending Addendum:  I saw and evaluated the patient, performing the key elements of the service.   I discussed the findings, assessment and plan with the resident and agree with the resident's findings and plan as documented in the resident's note. The patient was seen on 7/29/17    Vitals:    08/03/17 2300 08/03/17 2312 08/04/17 0407 08/04/17 0844   BP:  109/60 114/65 114/65   Pulse: 66 70 67 67   Resp:  16 16    Temp:  98.2 °F (36.8 °C) 98.4 °F (36.9 °C)    SpO2:  98% 97%    Weight:       Height:       LMP: 03/05/1963         Assessment/Plan:     Hospital Problems  Date Reviewed: 6/14/2017          Codes Class Noted POA    Hyponatremia ICD-10-CM: E87.1  ICD-9-CM: 276.1  8/3/2017 No        * (Principal)Hip fracture, right ([address] ICD-10-CM: S72.001A  ICD-9-CM: 820.8  7/28/2017 Yes        HTN (hypertension) ICD-10-CM: I10  ICD-9-CM: 401.9  2/15/2011 Yes    Overview Signed 5/28/2013  9:13 AM by Guillermina Gibbs MD     Goal < 150 SBP             GERD (gastroesophageal reflux disease) ICD-10-CM: K21.9  ICD-9-CM: 530.81  2/15/2011 Yes    Overview Signed 4/3/2012  1:25 PM by Guillermina Gibbs MD      2008             Hypothyroid ICD-10-CM: E03.9  ICD-9-CM: 244.9  2/15/2011 Yes          I saw and evaluated the patient, performing the key elements of the service. I discussed the findings, assessment and plan with the resident and agree with the resident's findings and plan as documented in the resident's note.   See H and P

## 2017-07-29 NOTE — CONSULTS
Cardiology Consultation Note                                 Raquel Cordova MD, [address], [place], [id number] [place] [address]                         Phone [phone]; Fax [phone]            2017  1:28 PM  Salomón Castaneda MD  :  1923   MRN:  [id number]     CC: third degree HB  Reason for consult: same  Admission Diagnosis: Hip fracture, right (HCC);RIGHT HIP FRACTURE    ASSESSMENT/RECOMMENDATIONS:   1)Hx 3D HB with pacemaker. -patient already had surgery  -no additional treatment needed from cardiology standpoint. Will see as needed  Watch I/O's      H/H 11.33, K 3.7, Creatinine 0.58       Guerrero Cain is a 80 y.o. 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 She had a fall and family reports right subcapital proximal femur fracture . Unable to do surgery because of infection. Allergies   Allergen Reactions    Ace Inhibitors Cough    Cozaar [Losartan] Angioedema    Hydrochlorothiazide Other (comments) and Vertigo     Weak in legs, HYPONATREMIA (109 - 2014 admission)    Niaspan [Niacin] Nausea Only    Pravastatin Other (comments)     dizzy    Prilosec [Omeprazole] Myalgia    Sular [Nisoldipine] Vertigo         Past Medical History:   Diagnosis Date    Atrioventricular block, complete ([address] 2/15/2011    GERD (gastroesophageal reflux disease) 2/15/2011    HTN (hypertension) 2/15/2011    Hypercholesteremia     Hypothyroid 2/15/2011    Murmur     Osteoporosis     Other and unspecified hyperlipidemia 2/15/2011    Pacemaker     Syncope and collapse 2/15/2011    Thyroid disease         Past Surgical History:   Procedure Laterality Date    HX GYN      hyst    HX HEENT      corneal tx    HX PACEMAKER      INS PPM/ICD LED DUAL ONLY  2014             . Home Medications:  Prior to Admission Medications   Prescriptions Last Dose Informant Patient Reported? Taking?    amLODIPine (NORVASC) 10 mg tablet 7/28/2017 at 1800 Family Member No Yes   Sig: TAKE ONE TABLET BY MOUTH DAILY   bisacodyl (DULCOLAX, BISACODYL,) 5 mg EC tablet Unknown at Unknown time Family Member Yes No   Sig: Take 5 mg by mouth as needed for Constipation. brimonidine (ALPHAGAN P) 0.1 % ophthalmic solution 7/28/2017 at am Family Member Yes Yes   Sig: Administer 1 drop to both eyes two (2) times a day. dorzolamide-timolol (COSOPT) 2-0.5 % ophthalmic solution 7/28/2017 at am Family Member Yes Yes   Sig: Administer 1 drop to both eyes two (2) times a day. furosemide (LASIX) 20 mg tablet 7/28/2017 at am Family Member No Yes   Sig: Take 1 tablet by mouth daily. labetalol (NORMODYNE) 100 mg tablet 7/28/2017 at am Family Member No Yes   Sig: TAKE ONE TABLET BY MOUTH EVERY 12 HOURS   latanoprost (XALATAN) 0.005 % ophthalmic solution 7/27/2017 at pm Family Member Yes Yes   Sig: Administer 1 drop to both eyes nightly. levothyroxine (SYNTHROID) 50 mcg tablet 7/28/2017 at am Family Member No Yes   Sig: TAKE ONE TABLET BY MOUTH DAILY BEFORE BREAKFAST   omega-3 fatty acids-vitamin e (FISH OIL) 1,000 mg Cap 7/28/2017 at am Family Member Yes Yes   Sig: Take 1 capsule by mouth daily. therapeutic multivitamin (THERAGRAN) tablet 7/28/2017 at am Family Member Yes Yes   Sig: Take 1 Tab by mouth daily.       Facility-Administered Medications: None       Hospital Medications:  Current Facility-Administered Medications   Medication Dose Route Frequency    acetaminophen (TYLENOL) tablet 1,000 mg  1,000 mg Oral Q8H    0.9% sodium chloride infusion  125 mL/hr IntraVENous CONTINUOUS    [START ON 7/30/2017] sodium chloride (NS) flush 5-10 mL  5-10 mL IntraVENous Q8H    sodium chloride (NS) flush 5-10 mL  5-10 mL IntraVENous PRN    naloxone (NARCAN) injection 0.4 mg  0.4 mg IntraVENous PRN    [START ON 7/30/2017] calcium-vitamin D (OS-REGINE) 500 mg-200 unit tablet  1 Tab Oral TID WITH MEALS    senna-docusate (PERICOLACE) 8.6-50 mg per tablet 1 Tab  1 Tab Oral BID    [START ON 7/30/2017] polyethylene glycol (MIRALAX) packet 17 g  17 g Oral DAILY    [START ON 7/31/2017] bisacodyl (DULCOLAX) suppository 10 mg  10 mg Rectal DAILY PRN    oxyCODONE IR (ROXICODONE) tablet 2.5 mg  2.5 mg Oral Q4H PRN    oxyCODONE IR (ROXICODONE) tablet 5 mg  5 mg Oral Q4H PRN    HYDROmorphone (PF) (DILAUDID) injection 0.5 mg  0.5 mg IntraVENous Q4H PRN    ceFAZolin in 0.9% NS (ANCEF) IVPB soln 2 g  2 g IntraVENous Q8H    [START ON 7/30/2017] enoxaparin (LOVENOX) injection 30 mg  30 mg SubCUTAneous DAILY    amLODIPine (NORVASC) tablet 10 mg  10 mg Oral DAILY    brimonidine (ALPHAGAN P) 0.1 % ophthalmic solution 1 Drop  1 Drop Both Eyes BID    dorzolamide-timolol (COSOPT) 22.3-6.8 mg/mL ophthalmic solution 1 Drop  1 Drop Both Eyes BID    labetalol (NORMODYNE) tablet 100 mg  100 mg Oral BID    latanoprost (XALATAN) 0.005 % ophthalmic solution 1 Drop  1 Drop Both Eyes QHS    levothyroxine (SYNTHROID) tablet 50 mcg  50 mcg Oral ACB    glucose chewable tablet 16 g  4 Tab Oral PRN    dextrose (D50W) injection syrg 12.5-25 g  12.5-25 g IntraVENous PRN    glucagon (GLUCAGEN) injection 1 mg  1 mg IntraMUSCular PRN    sodium chloride (NS) flush 5-10 mL  5-10 mL IntraVENous Q8H    sodium chloride (NS) flush 5-10 mL  5-10 mL IntraVENous PRN    HYDROmorphone (PF) (DILAUDID) injection 0.2 mg  0.2 mg IntraVENous Q4H PRN          OBJECTIVE       Laboratory and Imaging have been reviewed and are notable for        Diagnostic Tests:     No results for input(s): CPK, CKMB, TROIQ in the last 72 hours.     No lab exists for component: CKQMB, CPKMB  Recent Labs      07/29/17   0113  07/28/17   1443   NA  135*  132*   K  3.7  4.1   CO2  28  25   BUN  12  16   CREA  0.58  0.72   GLU  116*  215*   WBC  6.7  7.3   HGB  11.5  12.8   HCT  33.1*  37.8   PLT  313  298         Cardiac work up to date:    ECHO: 2009_ OK  STRESS 2009: negative   ECHO 2011: EF 65%, grade 1 DD, mild MR ETIENNE 9/4/2014: Igor Rodrigo                Social History:  Social History   Substance Use Topics    Smoking status: Never Smoker    Smokeless tobacco: Never Used    Alcohol use No       Family History:  Family History   Problem Relation Age of Onset    Alcohol abuse Brother        Review of Symptoms:  A comprehensive review of systems was negative except for that written in the HPI. Physical Exam:      Visit Vitals    /71 (BP 1 Location: Right arm, BP Patient Position: At rest)    Pulse 60    Temp 96.4 °F (35.8 °C)    Resp 16    Ht 4' 11\" (1.499 m)    Wt 98 lb (44.5 kg)    LMP 03/05/1963    SpO2 97%    BMI 19.79 kg/m2     General Appearance:  frai sleepy       Neck: Supple no JVD or bruits,no cervical lymphadenopathy   Chest:   Lungs clear to auscultation anterior   Cardiovascular:  Regular rate and rhythm,    Abdomen:   Soft, non-tender, bowel sounds are active. No abdominal bruits   Extremities: No edema bilaterally. Pulses detected, no varicosities                                                        I have discussed the diagnosis with the patient and the intended plan as seen in the above orders. Questions were answered concerning future plans. I have discussed medication side effects and warnings with the patient as well. Mina Bob is in agreement to the plan listed above and wishes to proceed. she  was instructed not to smoke, eat heart healthy diet  and to exercise. Thank you for this consult.       Shyanne De La Paz MD

## 2017-07-30 LAB
ANION GAP BLD CALC-SCNC: 8 MMOL/L (ref 5–15)
APPEARANCE UR: ABNORMAL
BACTERIA URNS QL MICRO: ABNORMAL /HPF
BASOPHILS # BLD AUTO: 0 K/UL (ref 0–0.1)
BASOPHILS # BLD: 0 % (ref 0–1)
BILIRUB UR QL: NEGATIVE
BUN SERPL-MCNC: 10 MG/DL (ref 6–20)
BUN/CREAT SERPL: 19 (ref 12–20)
CALCIUM SERPL-MCNC: 8.4 MG/DL (ref 8.5–10.1)
CHLORIDE SERPL-SCNC: 102 MMOL/L (ref 97–108)
CO2 SERPL-SCNC: 25 MMOL/L (ref 21–32)
COLOR UR: ABNORMAL
CREAT SERPL-MCNC: 0.54 MG/DL (ref 0.55–1.02)
EOSINOPHIL # BLD: 0 K/UL (ref 0–0.4)
EOSINOPHIL NFR BLD: 0 % (ref 0–7)
EPITH CASTS URNS QL MICRO: ABNORMAL /LPF
ERYTHROCYTE [DISTWIDTH] IN BLOOD BY AUTOMATED COUNT: 14.4 % (ref 11.5–14.5)
GLUCOSE SERPL-MCNC: 144 MG/DL (ref 65–100)
GLUCOSE UR STRIP.AUTO-MCNC: NEGATIVE MG/DL
HCT VFR BLD AUTO: 30.6 % (ref 35–47)
HGB BLD-MCNC: 10.4 G/DL (ref 11.5–16)
HGB UR QL STRIP: NEGATIVE
KETONES UR QL STRIP.AUTO: NEGATIVE MG/DL
LEUKOCYTE ESTERASE UR QL STRIP.AUTO: ABNORMAL
LYMPHOCYTES # BLD AUTO: 11 % (ref 12–49)
LYMPHOCYTES # BLD: 1 K/UL (ref 0.8–3.5)
MCH RBC QN AUTO: 29.7 PG (ref 26–34)
MCHC RBC AUTO-ENTMCNC: 34 G/DL (ref 30–36.5)
MCV RBC AUTO: 87.4 FL (ref 80–99)
MONOCYTES # BLD: 0.4 K/UL (ref 0–1)
MONOCYTES NFR BLD AUTO: 4 % (ref 5–13)
NEUTS SEG # BLD: 7.2 K/UL (ref 1.8–8)
NEUTS SEG NFR BLD AUTO: 85 % (ref 32–75)
NITRITE UR QL STRIP.AUTO: NEGATIVE
PH UR STRIP: 6 [PH] (ref 5–8)
PLATELET # BLD AUTO: 295 K/UL (ref 150–400)
POTASSIUM SERPL-SCNC: 3.9 MMOL/L (ref 3.5–5.1)
PROT UR STRIP-MCNC: NEGATIVE MG/DL
RBC # BLD AUTO: 3.5 M/UL (ref 3.8–5.2)
RBC #/AREA URNS HPF: ABNORMAL /HPF (ref 0–5)
SODIUM SERPL-SCNC: 135 MMOL/L (ref 136–145)
SP GR UR REFRACTOMETRY: 1.02 (ref 1–1.03)
UA: UC IF INDICATED,UAUC: ABNORMAL
UROBILINOGEN UR QL STRIP.AUTO: 0.2 EU/DL (ref 0.2–1)
WBC # BLD AUTO: 8.5 K/UL (ref 3.6–11)
WBC URNS QL MICRO: ABNORMAL /HPF (ref 0–4)

## 2017-07-30 PROCEDURE — 74011250636 HC RX REV CODE- 250/636: Performed by: ORTHOPAEDIC SURGERY

## 2017-07-30 PROCEDURE — 87086 URINE CULTURE/COLONY COUNT: CPT | Performed by: NURSE PRACTITIONER

## 2017-07-30 PROCEDURE — 85025 COMPLETE CBC W/AUTO DIFF WBC: CPT | Performed by: ORTHOPAEDIC SURGERY

## 2017-07-30 PROCEDURE — 77010033678 HC OXYGEN DAILY

## 2017-07-30 PROCEDURE — 74011250637 HC RX REV CODE- 250/637: Performed by: ORTHOPAEDIC SURGERY

## 2017-07-30 PROCEDURE — 74011250637 HC RX REV CODE- 250/637: Performed by: STUDENT IN AN ORGANIZED HEALTH CARE EDUCATION/TRAINING PROGRAM

## 2017-07-30 PROCEDURE — 81001 URINALYSIS AUTO W/SCOPE: CPT | Performed by: NURSE PRACTITIONER

## 2017-07-30 PROCEDURE — 36415 COLL VENOUS BLD VENIPUNCTURE: CPT | Performed by: ORTHOPAEDIC SURGERY

## 2017-07-30 PROCEDURE — 80048 BASIC METABOLIC PNL TOTAL CA: CPT | Performed by: ORTHOPAEDIC SURGERY

## 2017-07-30 PROCEDURE — 65270000029 HC RM PRIVATE

## 2017-07-30 PROCEDURE — 74011250636 HC RX REV CODE- 250/636: Performed by: INTERNAL MEDICINE

## 2017-07-30 RX ADMIN — LATANOPROST 1 DROP: 50 SOLUTION OPHTHALMIC at 21:10

## 2017-07-30 RX ADMIN — OXYCODONE HYDROCHLORIDE 2.5 MG: 5 TABLET ORAL at 20:18

## 2017-07-30 RX ADMIN — OYSTER SHELL CALCIUM WITH VITAMIN D 1 TABLET: 500; 200 TABLET, FILM COATED ORAL at 13:12

## 2017-07-30 RX ADMIN — DORZOLAMIDE HYDROCHLORIDE AND TIMOLOL MALEATE 1 DROP: 20; 5 SOLUTION/ DROPS OPHTHALMIC at 21:10

## 2017-07-30 RX ADMIN — LABETALOL HCL 100 MG: 100 TABLET, FILM COATED ORAL at 18:46

## 2017-07-30 RX ADMIN — Medication 10 ML: at 05:13

## 2017-07-30 RX ADMIN — CEFAZOLIN 2 G: 1 INJECTION, POWDER, FOR SOLUTION INTRAMUSCULAR; INTRAVENOUS; PARENTERAL at 01:27

## 2017-07-30 RX ADMIN — Medication 10 ML: at 13:16

## 2017-07-30 RX ADMIN — BRIMONIDINE TARTRATE 1 DROP: 1 SOLUTION/ DROPS OPHTHALMIC at 21:10

## 2017-07-30 RX ADMIN — OXYCODONE HYDROCHLORIDE 2.5 MG: 5 TABLET ORAL at 09:32

## 2017-07-30 RX ADMIN — AMLODIPINE BESYLATE 10 MG: 5 TABLET ORAL at 18:46

## 2017-07-30 RX ADMIN — ACETAMINOPHEN 1000 MG: 500 TABLET ORAL at 21:11

## 2017-07-30 RX ADMIN — BRIMONIDINE TARTRATE 1 DROP: 1 SOLUTION/ DROPS OPHTHALMIC at 09:34

## 2017-07-30 RX ADMIN — LABETALOL HCL 100 MG: 100 TABLET, FILM COATED ORAL at 09:31

## 2017-07-30 RX ADMIN — LEVOTHYROXINE SODIUM 50 MCG: 0.05 TABLET ORAL at 05:12

## 2017-07-30 RX ADMIN — OYSTER SHELL CALCIUM WITH VITAMIN D 1 TABLET: 500; 200 TABLET, FILM COATED ORAL at 18:46

## 2017-07-30 RX ADMIN — Medication 10 ML: at 20:20

## 2017-07-30 RX ADMIN — OYSTER SHELL CALCIUM WITH VITAMIN D 1 TABLET: 500; 200 TABLET, FILM COATED ORAL at 08:00

## 2017-07-30 RX ADMIN — DORZOLAMIDE HYDROCHLORIDE AND TIMOLOL MALEATE 1 DROP: 20; 5 SOLUTION/ DROPS OPHTHALMIC at 09:34

## 2017-07-30 RX ADMIN — CEFAZOLIN 2 G: 1 INJECTION, POWDER, FOR SOLUTION INTRAMUSCULAR; INTRAVENOUS; PARENTERAL at 20:10

## 2017-07-30 RX ADMIN — Medication 10 ML: at 13:17

## 2017-07-30 RX ADMIN — CEFAZOLIN 2 G: 1 INJECTION, POWDER, FOR SOLUTION INTRAMUSCULAR; INTRAVENOUS; PARENTERAL at 09:32

## 2017-07-30 RX ADMIN — VANCOMYCIN HYDROCHLORIDE 1000 MG: 1 INJECTION, POWDER, LYOPHILIZED, FOR SOLUTION INTRAVENOUS at 18:19

## 2017-07-30 RX ADMIN — ACETAMINOPHEN 1000 MG: 500 TABLET ORAL at 05:12

## 2017-07-30 RX ADMIN — ENOXAPARIN SODIUM 30 MG: 30 INJECTION SUBCUTANEOUS at 09:34

## 2017-07-30 RX ADMIN — ACETAMINOPHEN 1000 MG: 500 TABLET ORAL at 13:12

## 2017-07-30 NOTE — PROGRESS NOTES
Occupational Therapy  Order received and chart reviewed, noted order for out of bed if hemodynamically stable, WBAT and RN cleared for evaluation. After entering room NP Sophia Rk arrived and informed of need for bedrest and plan of care to be determined due to medical complexity and need for further testing (unclear in chart review at 0800). Will sign off at this time. Please re-consult when patient able to participate. Thank you.    Burt Domingo OTR/L

## 2017-07-30 NOTE — OP NOTES
Bautista Malave [facility]   [address] [facility], [address]   OP NOTE       Name:  Neal Leija   MR#:  [id number]   :  1923   Account #:  [de-identified]    Surgery Date:  2017   Date of Adm:  2017       PREOPERATIVE DIAGNOSIS: Left femoral neck fracture. POSTOPERATIVE DIAGNOSES    1. Left femoral neck fracture. 2. Evidence of purulent material in the hip capsule, suggestive of   osteomyelitis. OPERATIVE PROCEDURE      1. Left femoral head resection. 2. Excisional debridement of skin, subcutaneous tissue and bone. ANESTHESIA: General.    COMPLICATIONS: None. FINDINGS: Purulent material upon performing capsulotomy with   destruction of the subchondral femoral head. IMPLANTS: None. SPECIMENS: Femoral head for pathology and joint fluid for culture. INDICATIONS: The patient is a 80-year-old female who presented with   a displaced right femoral neck fracture after a fall. She initially was able   to ambulate on this for a couple of days, though had worsening pain   and inability to ambulate. She did have a leg length discrepancy on   exam. We discussed treatment options and plan for hip   hemiarthroplasty. After discussion of the risks, benefits, and   alternatives of surgery, particularly risks of bleeding, infection, damage   to surrounding nerves and blood vessels, need for further surgery, and   risks of anesthesia, her family who served as her power of    signed informed consent prior to proceeding. DESCRIPTION OF PROCEDURE: The patient was seen and identified   in the preanesthesia care unit. The operative site was marked. She   was evaluated with the anesthesia team and brought to the operative   suite on a stretcher. General anesthesia was induced. She was   transferred to the lateral decubitus position. An axillary roll was placed. All bony prominences were well-padded.  She was then prepped and   draped in the usual fashion. A timeout was undertaken, confirming the   appropriate site, side, procedure. She received Ancef prior to   proceeding. Next, a standard posterolateral approach to the hip was   then undertaken. A curvilinear incision was made angling from the   femoral shaft posteriorly towards the posterior superior iliac spine. The   full-thickness skin flaps were elevated off of the fascia which was   divided longitudinally in line with the incision. The short external   rotators were exposed. The piriformis was tagged and retracted. The   remainder of the external rotators were removed in a sleeve. Then, a   capsulotomy was performed using a rhomboid type of flap. Upon   incising this, there was noted to be purulent material within the   capsule. This was cultured. At this point, the decision was made to   avoid any implant for concern for infection. The head was excised. No   canal reaming was performed. The head was sent for pathology. A   femoral neck cut was made about 1 fingerbreadth proximal to the   lesser trochanter. Further irrigation was performed. Any foreign debris   was excised. At this point, the capsule was closed with 0 PDS, as was   the piriformis. The fascia was closed with a barbed suture. Subcutaneous tissue was closed with Vicryl. Skin was closed with   Monocryl and Dermabond. A sterile dressing was applied. POSTOPERATIVE PLAN: Follow with cultures and pathology results. Possible to continue with [place] type of procedure versus   possibly doing an antibiotic spacer versus metallic spacer pending the   patient's symptoms, as well as the results of pathology and   microbiology results. We will continue her on Ancef for now. We will   consult Infectious Disease.         MD ANNE Palumbo / KEVIN   D:  07/29/2017   13:25   T:  07/29/2017   23:50   Job #:  [id number]

## 2017-07-30 NOTE — PROGRESS NOTES
[address] [facility] [place] [address]   Office [phone]  Fax [phone]          Assessment and Teto Alfaro is a 80 y.o. female with a h/o TN, 3 degree AV block s/p pacer placement admitted for right hip fracture. She has spent 2 night(s) in the hospital.    24 Hour Events: No acute events. Hip Gladystine Catching-  Fall likely 2/2 to misstep/miscommunication between caregiver and pt. Per son's report, pt was able to ambulate with a walker and do many ADL semi-independently. No replacement was done during surgery due to concern for infection, pathologies sent.   -Ancef 2 g was started   - F/u ortho recommendations  -F/u wound, blood and urine cultures  -Tylenol 1000 mg for pain   - 0.2mg and 0.5 mg IV diluadid PRN, oxycodone 2.5 and 5 mg PRN        Glaucoma - Per children, only able to see shadows. History of failed cornea graft. - Continue home Dorzolamide-timolol  - Continue home brimonidine  - Continue home latanoprost      Hypothyroidism - TSH from 06/13/2016 was 1.140  - Continue home synthroid 50mcg daily      3rd degree heart block s/p pacer - Stable. - On tele      Hypertension  - BP on admission 153/58. At this time 140/65.  - Continuing home medications of labetalol 100mg and Norvasc 10mg. - Will continue to monitor at this time and readjust as BP's trend.      GERD - stable, not on home medicine.      Pre-diabetes  - A1c 6.5, stable. - Not on medications      FEN/GI - Cardiac diet. Activity - Bedrest  DVT prophylaxis - Lovenox  GI prophylaxis - Not indicated at this time  Disposition - Plan to d/c to TBD. Code Status - DNR    I appreciate the opportunity to participate in the care of this patient,  Sahara Kruger MD  Family Medicine Resident    Pt will be discussed with Dr. Fadumo Diaz ([facility] attending physician)         Subjective / Objective     Subjective:  Symptoms:  Stable. Diet:  Dietary issues: Pt needs food to be given to her     Activity level: Impaired due to pain. Pain:  She complains of pain that is moderate. Pain is requiring pain medication. Temp (24hrs), Av.5 °F (36.4 °C), Min:96.3 °F (35.7 °C), Max:98.6 °F (37 °C)     Objective:  General Appearance:  Comfortable, well-appearing, in no acute distress and in pain. Vital signs: (most recent): Blood pressure 140/65, pulse 70, temperature 98.4 °F (36.9 °C), resp. rate 16, height 4' 11\" (1.499 m), weight 98 lb 4.8 oz (44.6 kg), last menstrual period 1963, SpO2 95 %, not currently breastfeeding. No fever. HEENT: Normal HEENT exam.    Lungs:  Normal respiratory rate and normal effort. No wheezes, rales or rhonchi. Heart: Normal rate. Regular rhythm. S1 normal and S2 normal.  No murmur, gallop or friction rub. Extremities: (Wound is intact, dry and clean )  Neurological: Patient is alert. Skin:  Warm and dry. Abdomen: Abdomen is soft. Bowel sounds are normal.   There is no abdominal tenderness. There is no mass. Pupils:  Pupils are equal, round, and reactive to light. Pulses: Distal pulses are intact.       Respiratory: O2 Flow Rate (L/min): 3 l/min O2 Device: Nasal cannula     I/O:    Date 17 - 17 - 17 0659   Shift [id number] 24 Hour Total 1900-0659 24 Hour Total   I  N  T  A  K  E   I.V.  (mL/kg/hr) 1700  (3.2)  1700  (1.6)         I.V. 200  200         Volume (lactated Ringers infusion) 1500  1500       Shift Total  (mL/kg) 1700  (38.2)  1700  (38.1)      O  U  T  P  U  T   Urine  (mL/kg/hr) 250  (0.5)  250  (0.2)         Urine Occurrence(s) 1 x  1 x         Urine Output (mL) (Urinary Catheter [address]) 250  250       Blood 100  100         Estimated Blood Loss 100  100       Shift Total  (mL/kg) 350  (7.9)  350  (7.8)      NET 1350  1350      Weight (kg) 44.5 44.6 44.6 44.6 44.6 44.6       CBC:  Recent Labs      17   0406  17   0113  17   1443   WBC  8.5  6.7  7.3   HGB  10.4*  11.5  12.8   HCT 30.6*  33.1*  37.8   PLT  295  313  093       Metabolic Panel:  Recent Labs      07/30/17   0406  07/29/17   0113  07/28/17   1443   NA  135*  135*  132*   K  3.9  3.7  4.1   CL  102  100  97   CO2  25  28  25   BUN  10  12  16   CREA  0.54*  0.58  0.72   GLU  144*  116*  215*   CA  8.4*  8.6  8.8          For Billing    Chief Complaint   Patient presents with    Fall    Leg Pain       Hospital Problems  Date Reviewed: 6/14/2017          Codes Class Noted POA    Hip fracture, right [facility]) ICD-10-CM: S72.001A  ICD-9-CM: 820.8  7/28/2017 Unknown               [facility] [place] [facility] Residency Attending Addendum:  I saw and evaluated the patient, performing the key elements of the service. I discussed the findings, assessment and plan with the resident and agree with the resident's findings and plan as documented in the resident's note.     The patient was seen on 7/30/17      Vitals:    08/03/17 2300 08/03/17 2312 08/04/17 0407 08/04/17 0844   BP:  109/60 114/65 114/65   Pulse: 66 70 67 67   Resp:  16 16    Temp:  98.2 °F (36.8 °C) 98.4 °F (36.9 °C)    SpO2:  98% 97%    Weight:       Height:       LMP: 03/05/1963     NAD      Assessment/Plan:   S/p Right hip fracture-stable  Continue current care    Hospital Problems  Date Reviewed: 6/14/2017          Codes Class Noted POA    Hyponatremia ICD-10-CM: E87.1  ICD-9-CM: 276.1  8/3/2017 No        * (Principal)Hip fracture, right ([address] ICD-10-CM: S72.001A  ICD-9-CM: 820.8  7/28/2017 Yes        HTN (hypertension) ICD-10-CM: I10  ICD-9-CM: 401.9  2/15/2011 Yes    Overview Signed 5/28/2013  9:13 AM by Emeka Walker MD     Goal < 150 SBP             GERD (gastroesophageal reflux disease) ICD-10-CM: K21.9  ICD-9-CM: 530.81  2/15/2011 Yes    Overview Signed 4/3/2012  1:25 PM by Emeka Walker MD      2008             Hypothyroid ICD-10-CM: E03.9  ICD-9-CM: 244.9  2/15/2011 Yes

## 2017-07-30 NOTE — PROGRESS NOTES
7/30/2017 4:09 PM Case management consult for discharge planning received. Discussed plan with Dr. Yaz Ferrer, who relayed if cultures are negative likely pt will have surgery. If cultures are positive pt will need iv abx and will not have surgery prior to discharge. Met with pt and pt's daughter, Slava Dave. Pt's daughter provided all information for the assessment. Charted address and phone numbers confirmed. Pt's daughter reported pt was living with the pt's son, Gopi Brian in [place] prior to admission. Pt's son assisted pt with adls and iadls. Pt was walking with a RW short distances prior to admission. Pt's daughter reported the pt was alone at home while her son was at work. Pt's daughter reported her brother had been looking for medicaid LTC bed for the pt prior to admission. Pt's son was going to meet with [facility] admissions this week for possible placement. CM explained it is likely once pt is discharged she will need rehab and unsure if [facility] can accommodate. Pt's daughter understood. Pt's daughter reported they were interested in [facility] due to it being run by [employer] and pt is [employer]. List of SNFs was provided to pt's daughter to review if [facility] cannot accept pt. CM called [facility] admissions, Annabel Steele at [phone] and [facility] regarding referral.   Pt's PCP's nurse navigator has been notified of pt's admission. CM will follow up. YUN Wilkinson   Care Management Interventions  PCP Verified by CM:  Yes Dana Oglesby )  Transition of Care Consult (CM Consult): Discharge Planning  Current Support Network: [place]

## 2017-07-30 NOTE — PROGRESS NOTES
Bedside and Verbal shift change report given to Elza Meng RN (oncoming nurse) by Roslyn Ojeda RN (offgoing nurse). Report included the following information SBAR, Kardex, OR Summary, Procedure Summary and MAR.

## 2017-07-30 NOTE — PROGRESS NOTES
Bedside and Verbal shift change report given to [address] [place] [address] (oncoming nurse) by Oni Bryant RN (offgoing nurse). Report included the following information SBAR, Kardex, MAR and Recent Results.

## 2017-07-30 NOTE — PROGRESS NOTES
Physical Therapy  Order received and acknowledged. Order states WBAT out of bed with assistance s/p hip fracture repair. Per Ortho NP patient is on bedrest until further notice at this time despite order set due to complexity of medical problem. We will complete the PT order at this time.   Please re-consult us when appropriate  Jessa Colin, PT, DPT, CLT

## 2017-07-30 NOTE — PROGRESS NOTES
[facility] Dosing Services: Antimicrobial Stewardship Progress Note    Consult for antibiotic dosing of vancomycin by Dr. Patti Sunshine  Pharmacist reviewed antibiotic appropriateness for 80year old , female  for indication of bone and joint infection  Day of Therapy 1    Plan:  Vancomycin therapy:  Start Vancomycin therapy, with loading dose of 1000 (mg) x 1  Follow with maintenance dose of 750 mg IV q 24 hours  Dose calculated to approximate a therapeutic trough of 15-20 mcg/mL. Last trough level / Plan for level: prior to the third dose on 8/1 PM, not ordered    Pharmacy to follow daily and will make changes to dose and/or frequency based on clinical status. Other Antimicrobial  (not dosed by pharmacist)   Ancef 2g IV q 8 hours  (ID ON)   Cultures     7/29 wound: NGTD P  7/29 bcx: NGTD P  7/30 ucx: NGTD P     Serum Creatinine     Lab Results   Component Value Date/Time    Creatinine 0.54 07/30/2017 04:06 AM    Creatinine (POC) 0.8 10/11/2009 12:42 PM       Creatinine Clearance Estimated Creatinine Clearance: 44.9 mL/min (based on Cr of 0.54).      Temp   98.4 °F (36.9 °C)    WBC   Lab Results   Component Value Date/Time    WBC 8.5 07/30/2017 04:06 AM       H/H   Lab Results   Component Value Date/Time    HGB 10.4 07/30/2017 04:06 AM        Platelets   Lab Results   Component Value Date/Time    PLATELET 186 45/49/7227 04:06 AM          Pharmacist: Signed Lu Castro PHARMD Contact information: [phone]

## 2017-07-30 NOTE — PROGRESS NOTES
Gopi Tucker is a 80 y.o. female, who is POD# 1 s/p R femoral head resection with concern for purulence on approach. PLAN    1. Follow cx. ID consult. If no evidence of infection on cx (only one preop dose of ancef just prior to incision/cx, unlikely to alter cx dramatically), would likely recommend hemiarthroplasty. It is possible that the purulent appearance may have been liquefied bone contained intracapsular as the injury occurred about 3 days prior to surgery, though typically see seroma/hematoma, not this yellowish fluid that was noted intraop  2. Bedrest for now  3. DVT Prophylaxis : Mechanical / Lovenox  4. Pain control. Salvador Barnes MD  Office : [phone]  Cell: [phone]     Subjective:     Resting, complaining of appropriate pain. Objective  Objective:   Patient Vitals for the past 12 hrs:   BP Temp Pulse Resp SpO2   07/30/17 1017 125/61 - 68 - 92 %   07/30/17 0751 140/65 98.4 °F (36.9 °C) 70 16 95 %   07/30/17 0702 - - 65 - -   07/30/17 0441 119/59 97.5 °F (36.4 °C) 65 16 97 %   07/30/17 0126 - - 65 - -   07/29/17 2347 102/46 97.6 °F (36.4 °C) 60 16 98 %       GENERAL: No acute distress. Alert and oriented. Well nourished and well hydrated. Appears stated age. HEENT : Normocephalic, atraumatic. Extraocular movements intact. Mucous membranes moist    NECK: Supple, trachea midline. LUNGS: Adequate and symmetric respiratory effort. No intercostal retractions or accessory muscle use. HEART: Extremities warm and perfused. ABDOMEN: Soft, nontender, nondistended, no guarding. SKIN: Warm and dry, no rash. MUSCULOSKELETAL EXAM  Right lower extremity - Dressing in tact. Calf and thigh soft. Skin is warm and well perfused.   Sensation grossly in tact to light touch.  + dorsiflexion, plantarflexion, EHL      LABS :  Recent Labs      07/30/17   0406   HGB  10.4*   HCT  30.6*   NA  135*   K  3.9   CL  102   CO2  25   BUN  10   CREA  0.54*   GLU  144* WBC  8.5       No results found for: SDES    Lab Results   Component Value Date/Time    Culture result: NO GROWTH AFTER 15 HOURS 07/29/2017 03:24 PM    Culture result: NO GROWTH AFTER 20 HOURS 07/29/2017 11:58 AM    Culture result: ALPHA STREPTOCOCCUS 04/26/2016 12:46 PM    Culture result: MRSA NOT PRESENT 09/03/2014 05:07 AM    Culture result:  09/03/2014 05:07 AM         Screening of patient nares for MRSA is for surveillance purposes and, if positive, to facilitate isolation considerations in high risk settings. It is not intended for automatic decolonization interventions per se as regimens are not sufficiently effective to warrant routine use.

## 2017-07-30 NOTE — PROGRESS NOTES
Primary Nurse Celia Torres RN and Josiah Parikh RN performed a dual skin assessment on this patient Impairment noted- see wound doc flow sheet  Martin score is 15

## 2017-07-31 LAB
ANION GAP BLD CALC-SCNC: 7 MMOL/L (ref 5–15)
BACTERIA SPEC CULT: NORMAL
BASOPHILS # BLD AUTO: 0 K/UL (ref 0–0.1)
BASOPHILS # BLD: 0 % (ref 0–1)
BUN SERPL-MCNC: 11 MG/DL (ref 6–20)
BUN/CREAT SERPL: 18 (ref 12–20)
CALCIUM SERPL-MCNC: 8.9 MG/DL (ref 8.5–10.1)
CC UR VC: NORMAL
CHLORIDE SERPL-SCNC: 103 MMOL/L (ref 97–108)
CO2 SERPL-SCNC: 28 MMOL/L (ref 21–32)
CREAT SERPL-MCNC: 0.61 MG/DL (ref 0.55–1.02)
EOSINOPHIL # BLD: 0.1 K/UL (ref 0–0.4)
EOSINOPHIL NFR BLD: 1 % (ref 0–7)
ERYTHROCYTE [DISTWIDTH] IN BLOOD BY AUTOMATED COUNT: 14.7 % (ref 11.5–14.5)
GLUCOSE SERPL-MCNC: 128 MG/DL (ref 65–100)
HCT VFR BLD AUTO: 29.6 % (ref 35–47)
HGB BLD-MCNC: 9.9 G/DL (ref 11.5–16)
LYMPHOCYTES # BLD AUTO: 22 % (ref 12–49)
LYMPHOCYTES # BLD: 2.1 K/UL (ref 0.8–3.5)
MAGNESIUM SERPL-MCNC: 1.7 MG/DL (ref 1.6–2.4)
MCH RBC QN AUTO: 29.8 PG (ref 26–34)
MCHC RBC AUTO-ENTMCNC: 33.4 G/DL (ref 30–36.5)
MCV RBC AUTO: 89.2 FL (ref 80–99)
MONOCYTES # BLD: 0.8 K/UL (ref 0–1)
MONOCYTES NFR BLD AUTO: 8 % (ref 5–13)
NEUTS SEG # BLD: 6.5 K/UL (ref 1.8–8)
NEUTS SEG NFR BLD AUTO: 69 % (ref 32–75)
PHOSPHATE SERPL-MCNC: 3.4 MG/DL (ref 2.6–4.7)
PLATELET # BLD AUTO: 307 K/UL (ref 150–400)
POTASSIUM SERPL-SCNC: 3.2 MMOL/L (ref 3.5–5.1)
RBC # BLD AUTO: 3.32 M/UL (ref 3.8–5.2)
SERVICE CMNT-IMP: NORMAL
SODIUM SERPL-SCNC: 138 MMOL/L (ref 136–145)
WBC # BLD AUTO: 9.4 K/UL (ref 3.6–11)

## 2017-07-31 PROCEDURE — 65270000029 HC RM PRIVATE

## 2017-07-31 PROCEDURE — 80048 BASIC METABOLIC PNL TOTAL CA: CPT | Performed by: ORTHOPAEDIC SURGERY

## 2017-07-31 PROCEDURE — 84100 ASSAY OF PHOSPHORUS: CPT

## 2017-07-31 PROCEDURE — 36415 COLL VENOUS BLD VENIPUNCTURE: CPT | Performed by: ORTHOPAEDIC SURGERY

## 2017-07-31 PROCEDURE — 74011250636 HC RX REV CODE- 250/636: Performed by: ORTHOPAEDIC SURGERY

## 2017-07-31 PROCEDURE — 74011250637 HC RX REV CODE- 250/637: Performed by: STUDENT IN AN ORGANIZED HEALTH CARE EDUCATION/TRAINING PROGRAM

## 2017-07-31 PROCEDURE — 93971 EXTREMITY STUDY: CPT

## 2017-07-31 PROCEDURE — 74011250636 HC RX REV CODE- 250/636: Performed by: INTERNAL MEDICINE

## 2017-07-31 PROCEDURE — 83735 ASSAY OF MAGNESIUM: CPT

## 2017-07-31 PROCEDURE — 85025 COMPLETE CBC W/AUTO DIFF WBC: CPT | Performed by: ORTHOPAEDIC SURGERY

## 2017-07-31 PROCEDURE — 74011250637 HC RX REV CODE- 250/637: Performed by: ORTHOPAEDIC SURGERY

## 2017-07-31 RX ORDER — POTASSIUM CHLORIDE 1.5 G/1.77G
40 POWDER, FOR SOLUTION ORAL
Status: COMPLETED | OUTPATIENT
Start: 2017-07-31 | End: 2017-07-31

## 2017-07-31 RX ADMIN — BRIMONIDINE TARTRATE 1 DROP: 1 SOLUTION/ DROPS OPHTHALMIC at 08:28

## 2017-07-31 RX ADMIN — Medication 10 ML: at 14:55

## 2017-07-31 RX ADMIN — VANCOMYCIN HYDROCHLORIDE 750 MG: 10 INJECTION, POWDER, LYOPHILIZED, FOR SOLUTION INTRAVENOUS at 18:42

## 2017-07-31 RX ADMIN — ACETAMINOPHEN 1000 MG: 500 TABLET ORAL at 06:30

## 2017-07-31 RX ADMIN — CEFAZOLIN 2 G: 1 INJECTION, POWDER, FOR SOLUTION INTRAMUSCULAR; INTRAVENOUS; PARENTERAL at 20:18

## 2017-07-31 RX ADMIN — LABETALOL HCL 100 MG: 100 TABLET, FILM COATED ORAL at 08:27

## 2017-07-31 RX ADMIN — ACETAMINOPHEN 1000 MG: 500 TABLET ORAL at 14:55

## 2017-07-31 RX ADMIN — ENOXAPARIN SODIUM 30 MG: 30 INJECTION SUBCUTANEOUS at 08:28

## 2017-07-31 RX ADMIN — POTASSIUM CHLORIDE 40 MEQ: 1.5 POWDER, FOR SOLUTION ORAL at 08:27

## 2017-07-31 RX ADMIN — OXYCODONE HYDROCHLORIDE 5 MG: 5 TABLET ORAL at 17:22

## 2017-07-31 RX ADMIN — BRIMONIDINE TARTRATE 1 DROP: 1 SOLUTION/ DROPS OPHTHALMIC at 20:18

## 2017-07-31 RX ADMIN — OXYCODONE HYDROCHLORIDE 2.5 MG: 5 TABLET ORAL at 06:30

## 2017-07-31 RX ADMIN — AMLODIPINE BESYLATE 10 MG: 5 TABLET ORAL at 17:23

## 2017-07-31 RX ADMIN — LABETALOL HCL 100 MG: 100 TABLET, FILM COATED ORAL at 17:23

## 2017-07-31 RX ADMIN — LEVOTHYROXINE SODIUM 50 MCG: 0.05 TABLET ORAL at 06:30

## 2017-07-31 RX ADMIN — OYSTER SHELL CALCIUM WITH VITAMIN D 1 TABLET: 500; 200 TABLET, FILM COATED ORAL at 17:23

## 2017-07-31 RX ADMIN — Medication 10 ML: at 06:36

## 2017-07-31 RX ADMIN — OYSTER SHELL CALCIUM WITH VITAMIN D 1 TABLET: 500; 200 TABLET, FILM COATED ORAL at 08:27

## 2017-07-31 RX ADMIN — OYSTER SHELL CALCIUM WITH VITAMIN D 1 TABLET: 500; 200 TABLET, FILM COATED ORAL at 11:39

## 2017-07-31 RX ADMIN — DORZOLAMIDE HYDROCHLORIDE AND TIMOLOL MALEATE 1 DROP: 20; 5 SOLUTION/ DROPS OPHTHALMIC at 20:18

## 2017-07-31 RX ADMIN — CEFAZOLIN 2 G: 1 INJECTION, POWDER, FOR SOLUTION INTRAMUSCULAR; INTRAVENOUS; PARENTERAL at 11:39

## 2017-07-31 RX ADMIN — DORZOLAMIDE HYDROCHLORIDE AND TIMOLOL MALEATE 1 DROP: 20; 5 SOLUTION/ DROPS OPHTHALMIC at 08:28

## 2017-07-31 RX ADMIN — ACETAMINOPHEN 1000 MG: 500 TABLET ORAL at 22:10

## 2017-07-31 RX ADMIN — CEFAZOLIN 2 G: 1 INJECTION, POWDER, FOR SOLUTION INTRAMUSCULAR; INTRAVENOUS; PARENTERAL at 02:44

## 2017-07-31 RX ADMIN — LATANOPROST 1 DROP: 50 SOLUTION OPHTHALMIC at 22:10

## 2017-07-31 NOTE — CONSULTS
Bautista Ananda [facility] 79   201 [facility], [address]   0 [facility]       Name:  Asif Gonzalez   MR#:  [id number]   :  1923   Account #:  [de-identified]    Date of Consultation:  2017   Date of Adm:  2017       REASON FOR CONSULTATION: Left femur possible infection. HISTORY OF PRESENT ILLNESS: This is a 80year-old with a past   medical history as described below, who came in with right-sided   pelvic pain. The patient does not speak a whole lot of English and she   is old age. Her daughter, at the bedside, has filled me in with most of   the information. According to the daughter, about 3 years ago, the   patient had a fall and had a hairline fracture of the right femur, but   since then, she has had multiple other falls, very recent. The last one   was a few weeks ago, and so the patient started to have more pain in   the left pelvic region and she was brought into the emergency room. The patient denies any fever, chills, cough signs of bleeding difficulties,   nausea, vomiting, abdominal pain, diarrhea, or other complaints right   now or at home. The patient's right hip x-ray showed a proximal femur   fracture. She was taken to surgery for arthroplasty, but  they noticed   that there was a purulent-looking material in the right femur region, so   she underwent femur head resection and arthroplasty was put on hold. REVIEW OF SYSTEMS: All the positive pertinent findings are   mentioned in HPI. All other systems are reviewed in detail are mostly   negative. PAST MEDICAL HISTORY   Significant for:    1. Complete AV block, status post pacemaker placement. 2. Gastroesophageal reflux disease. 3. Hypertension. 4. Hypercholesterolemia. 5. Hypothyroidism. 6. Murmur. 7. Osteoporosis. 8. Syncope and collapse. MEDICATIONS: The patient was getting IV Ancef. I started IV   vancomycin.  Other medications are reviewed in detail in the chart.    ALLERGIES    1. ACE INHIBITORS. 2. COZAAR. 3. HYDROCHLOROTHIAZIDE. 4. NIASPAN. 5. PRAVASTATIN. 6. PRILOSEC. 7. SULAR. SOCIAL HISTORY: The patient lives with family. Uses walker. No   smoking, no alcohol, no illicit drug use. She is a DNR/DNI. FAMILY HISTORY: Noncontributory. PHYSICAL EXAMINATION   GENERAL: Older female lying comfortably in the bed, not in any   apparent distress. VITAL SIGNS: Temperature 98, heart rate 62, respiratory rate 18,   blood pressure 144/68. HEENT: Mild pallor. No icterus. Pupils are reactive to the right. CHEST: Clear to auscultation bilaterally with decreased air entry at the   bases. Poor inspiratory effort. HEART: Regular rate and rhythm, S1, S2.   ABDOMEN: Soft, nontender. Bowel sounds are present. Right-sided   surgical site clean, dry, and intact. LABORATORY DATA: White count 9.4, hemoglobin 9.9, platelets   542,554. BUN 11, creatinine 0.61, hemoglobin A1c 6.5. Blood cultures   on 07/29/2017 have been negative. OR culture from 07/29/2017, 2   days, has been negative. Pelvis x-ray showing a right proximal femur fracture. ASSESSMENT AND PLAN: This is a 70-year-old female coming with:     1. Possible right femur infection. 2. Right femur neck fracture, status post femur hardware resection and   debridement. Per daughter, the patient had a hairline fracture 3 years   ago after one fall and since then has had many other falls. The patient   was taken to the OR and there was a question of purulent-looking   material. Cultures have been sent, they are pending. Continue IV   vancomycin for now. 3. Old age. 4. History of AV block, status post pacemaker. Blood cultures have   been negative. Thank you so much for the opportunity to participate in your patient's   care. We will continue to follow.         MD GLORY Butts / TRICE   D:  07/31/2017   10:30   T:  07/31/2017   11:02   Job #:  [id number]

## 2017-07-31 NOTE — PROGRESS NOTES
7/31/2017  CARE MANAGEMENT NOTE:  CM reviewed EMR for clinical updates and handoff received from weekend case management. CM met with pt's family member this a.m and obtained SNF choices for rehab. Referrals were sent to [facility], [facility], and [facility]. Will continue to follow for placement.   Radha

## 2017-07-31 NOTE — CDMP QUERY
The following is noted on the medical record:    * U/A 1+ bacteria, 5-10 WBC, moderate leuks, negative nitrites  * 7/30 UCx pending  * Receiving Ancef 2 gm IV q8h & IV Vancomycin per PharmD dosing    Based on your medical judgment, please clarify if this patient may have:    =>UTI  =>Pyuria  =>Other Explanation of clinical findings  =>Unable to Determine (no explanation of clinical findings)    Please clarify and document your clinical opinion in the progress notes and discharge summary including the definitive and/or presumptive diagnosis, (suspected or probable), related to the above clinical findings. Please include clinical findings supporting your diagnosis.     [facility], [facility], [address] [place] [address]  [email or web address]

## 2017-07-31 NOTE — PROGRESS NOTES
Palliative Medicine  [place]: [phone])  [facility]: [phone])      Resuscitation Status: DNR   Durable DNR addressed? [] Yes   [x] No    [] Not Applicable *Will plan to address prior to discharge    Advance Care Planning 7/31/2017   Patient's Healthcare Decision Maker is: Legal Next of Kin   Primary Decision Maker Name Son Jules Rogers (equal authority with 7 other siblings)   Primary Decision Maker Phone Number [phone]   Primary Decision Maker Relationship to Patient Adult child   Confirm Advance Directive None           Patient / Family Encounter Documentation    Participants (names): Pt, dtr Sheri, Palliative Medicine (Dr. Bambi Leggett, Phillip Ma)    Narrative:  Pt was awake in bed, participated only minimally in conversation. Dtr Mejia Worthington is currently visiting from [place], has been driving back and forth frequently over the past several days. Pt has 8 children, has been living with son Dillon Singer for 20 yrs. [place] originally from [place], parents and  were from [place]. Pt speaks 5 languages. Pt was still very active 3 yrs ago, was regularly running/power walking. Pt had episode of \"blacking out\" while in the garden in 2014, had pacemaker placed. Pt is hard of hearing, vision is limited. Dtr reports \"typical day\" for pt lately includes watching the news and cooking shows on tv, reports pt is able to get around with assistance. Dtr verbalized good understanding of pt's current medical condition and potential care decisions pt/family might be facing, reports it will be a challenge to elicit any firm decisions from pt re: her care. Pt does not have AMD in place. Dtr reports pt is a very strong woman, became tearful at the prospect that pt might not return to baseline, shared that pt highly values her independence, would not likely do well in a long term care setting but family concerned about son's ability to continue caring for pt at home.     Psychosocial Issues Identified: Caregiver stress, grief    Goals of Care / Plan: Dtr requested Palliative team speak with her siblings, particularly Erlinda, with whom pt resides. Awaiting additional information re: wound cultures. Dtr requesting PT/OT work with pt on upper body exercises to maintain strength; pt currently on bedrest.  SW will follow for support. Thank you for including Palliative Medicine in the care of Ms.  [address] [facility], [facility], [facility]-  [phone])

## 2017-07-31 NOTE — PROGRESS NOTES
Bedside and Verbal shift change report given to Linda Aldrich RN (oncoming nurse) by Jett Rowan RN (offgoing nurse). Report included the following information SBAR, Kardex, OR Summary, Procedure Summary, Intake/Output and MAR.

## 2017-07-31 NOTE — CDMP QUERY
This patient was admitted for right subcapital femur fracture. The following is also noted on the medical record:    * 81 yo post-menopausal female PMH osteoporosis & hypothyroidism  * Patient dropped to floor during stand/pivot transfer  * Dietician notes patient to be underweight  * Receiving with Os-Bryan 500 mg/200 IU po TID  * Treated with Left femoral head resection and excisional debridement of skin/sq tissue and bone; possible hemiarthroplasty vs spacer in future. Based on your medical judgment, could you please clarify if this patient is being managed for:    =>Pathological fracture of right subcapital femur  =>Insufficiency fracture of right subcapital femur   =>Osteoporotic fracture of right subcapital femur   =>Other Explanation of clinical findings  =>Unable to Determine (no explanation of clinical findings)    Please clarify and document your clinical opinion in the progress notes and discharge summary including the definitive and/or presumptive diagnosis, (suspected or probable), related to the above clinical findings. Please include clinical findings supporting your diagnosis.     Megan Mortensen, [facility], [address] [place] [address]  [email or web address]

## 2017-07-31 NOTE — PROCEDURES
Mansi Hernandez  *** FINAL REPORT ***    Name: Jonathan Garza  MRN: [id number]    Inpatient  : 1923  HIS Order #: [id number]  [address] [facility] Visit #: [id number]  Date: 2017    TYPE OF TEST: Peripheral Venous Testing    REASON FOR TEST  Pain in limb, Limb swelling    Right Leg:-  Deep venous thrombosis:           No  Superficial venous thrombosis:    No  Deep venous insufficiency:        No  Superficial venous insufficiency: No      INTERPRETATION/FINDINGS  PROCEDURE:  RIGHT LOWER EXTREMITY  VENOUS DUPLEX. Evaluation of lower  extremity veins with ultrasound (B-mode imaging, pulsed Doppler, color   Doppler). Includes the common femoral, deep femoral, femoral,  popliteal, posterior tibial, peroneal, and great saphenous veins. Other veins, for example the gastrocnemius and soleal veins, may also  be visualized. FINDINGS: Allean Merritts scale and color flow duplex images of the veins in the  right lower extremity demonstrate normal compressibility, spontaneous  and augmented flow profiles, and absence of filling defects throughout   the deep and superficial veins in the right lower extremity. CONCLUSION: Right lower extremity venous duplex negative for deep  venous thrombosis or thrombophlebitis. Left common femoral vein is  thrombus free. ADDITIONAL COMMENTS    I have personally reviewed the data relevant to the interpretation of  this  study. TECHNOLOGIST: Yumi Goodrich.  Farzaneh Rhodes  Signed: 2017 06:47 PM    PHYSICIAN: Braydon Kearney MD  Signed: 2017 06:24 AM

## 2017-07-31 NOTE — PROGRESS NOTES
Nutrition Assessment:    RECOMMENDATIONS/INTERVENTION(S):   Start Mechanical Soft diet (minced meats) 2/2 poor dentition    Start Ensure Compact BID (chocolate) - snacks    Monitor PO intake & tolerance, BG, wt  ASSESSMENT:   7/31:  Consult received for general nutrition management & supplements. 80 yof admitted for R hip fx. Pmhx includes HTN, GERD, hypothyroidism, pacemaker. Underwt BMI per advanced age - hx of low BMI per wt hx records. S/p R femoral head resection. Surgical incision to R hip, no PI or edema noted. Visited pt this morning, daughter at bedside. Reports fair appetite currently and PTA, states need for soft diet - minced meats. Pt liked Ensure in the past w/ pacemaker surgery - agreeable to ONS while admitted. RD to adjust diet texture, start ONS and add pt preferences (ie boiled egg at all meals). Labs/meds reviewed. K requiring repletion. ; A1C 6.5. SUBJECTIVE/OBJECTIVE:     Diet Order: Regular  % Eaten:  No data found. Pertinent Medications: [x] Reviewed - os-vinod, miralax, pericolace    Chemistries:  Lab Results   Component Value Date/Time    Sodium 138 07/31/2017 03:01 AM    Potassium 3.2 07/31/2017 03:01 AM    Chloride 103 07/31/2017 03:01 AM    CO2 28 07/31/2017 03:01 AM    Anion gap 7 07/31/2017 03:01 AM    Glucose 128 07/31/2017 03:01 AM    BUN 11 07/31/2017 03:01 AM    Creatinine 0.61 07/31/2017 03:01 AM    BUN/Creatinine ratio 18 07/31/2017 03:01 AM    GFR est AA >60 07/31/2017 03:01 AM    GFR est non-AA >60 07/31/2017 03:01 AM    Calcium 8.9 07/31/2017 03:01 AM    AST (SGOT) 15 06/13/2016 01:55 PM    Alk.  phosphatase 78 06/13/2016 01:55 PM    Protein, total 6.4 06/13/2016 01:55 PM    Albumin 4.2 06/13/2016 01:55 PM    Globulin 3.4 09/06/2014 04:50 AM    A-G Ratio 1.9 06/13/2016 01:55 PM    ALT (SGPT) 8 06/13/2016 01:55 PM      Anthropometrics: Height: 4' 10\" (147.3 cm) Weight: 45.2 kg (99 lb 10.4 oz)    IBW (%IBW): 47.5 kg (104 lb 11.5 oz) (95.16 %) UBW (%UBW): 47.6 kg (105 lb) (94.9 %)    BMI: Body mass index is 20.83 kg/(m^2). This BMI is indicative of:   [x] Underweight - per advanced age   [] Normal    [] Overweight    []  Obesity    []  Extreme Obesity (BMI>40)  Estimated Nutrition Needs (Based on): 1234 Kcals/day (-1484 (RMR (757) x 1.3 AF + 250-500)) , 58 g (-67 (1.3-1.5 g/kg x actual BW)) Protein  Carbohydrate: At Least 130 g/day  Fluids: 1711-9578 mL/day    Last BM: 7/31   [x]Active     []Hyperactive  []Hypoactive       [] Absent   BS  Skin:    [] Intact   [x] Incision - R hip  [] Breakdown   [] DTI   [] Tears/Excoriation/Abrasion  []Edema [] Other:    Wt Readings from Last 30 Encounters:   07/31/17 45.2 kg (99 lb 10.4 oz)   06/14/17 42.3 kg (93 lb 4 oz)   04/18/17 44 kg (97 lb)   06/13/16 47.6 kg (105 lb)   04/27/16 47.6 kg (105 lb)   04/26/16 47.6 kg (105 lb)   01/20/15 41 kg (90 lb 6.4 oz)   12/03/14 39.5 kg (87 lb)   09/17/14 37.6 kg (83 lb)   09/16/14 40.4 kg (89 lb)   09/06/14 41 kg (90 lb 4.8 oz)   08/28/14 41.6 kg (91 lb 12.8 oz)   04/15/14 42.5 kg (93 lb 12.8 oz)   06/25/13 40.8 kg (90 lb)   05/28/13 41.4 kg (91 lb 3.2 oz)   04/30/13 42 kg (92 lb 9.6 oz)   03/05/13 44 kg (97 lb)   09/21/12 43.1 kg (95 lb)   04/03/12 43.5 kg (95 lb 12.8 oz)   02/27/12 43.7 kg (96 lb 6.4 oz)   01/16/12 44.5 kg (98 lb)   01/09/12 44.5 kg (98 lb)   09/26/11 43.1 kg (95 lb)   08/30/11 43.1 kg (95 lb)   08/01/11 44.9 kg (99 lb)   06/15/11 44 kg (97 lb)      NUTRITION DIAGNOSES:   Problem:  Inadequate protein-energy intake     Etiology: related to missing teeth, post-op     Signs/Symptoms: as evidenced by need for soft solids, inadequate intake as compared to increased needs      NUTRITION INTERVENTIONS:  Meals/Snacks: General/healthful diet, Modify diet/texture/consistency/nutrients   Supplements: Commercial supplement Feeding Assistance: Meal setup, Feeding assistance at meals            GOAL:   Consume/tolerate >/= 50% w/ stable lytes in the next 1-3 days    Cultural, Quaker, or Ethnic Dietary Needs: Other (comment) - prefers Asian food per family    LEARNING NEEDS (Diet, Food/Nutrient-Drug Interaction):    [x] None Identified   [] Identified and Education Provided/Documented   [] Identified and Pt declined/was not appropriate      [x] Interdisciplinary Care Plan Reviewed/Documented    [x] Discharge Needs:   See D/C note   [] No Nutrition Related Discharge Needs    NUTRITION RISK:   Pt Is At Nutrition Risk  [x]     No Nutrition Risk Identified  []       PT SEEN FOR:    [x]  MD Consult: []Calorie Count      []Diabetic Diet Education        []Diet Education     []Electrolyte Management     [x]General Nutrition Management and Supplements     []Management of Tube Feeding     []TPN Recommendations    []  RN Referral:  []MST score >=2     []Enteral/Parenteral Nutrition PTA     []Pregnant: Gestational DM or Multigestation                 [] Pressure Ulcer    []  Low BMI      []  Length of Stay       [] Dysphagia Diet         [] Ventilator  []  Follow-up     Previous Recommendations:   [] Implemented          [] Not Implemented          [x] Not Applicable    Previous Goal:   [] Met              [] Progressing Towards Goal              [] Not Progressing Towards Goal   [x] Not Applicable            Katharine Henriquez, [address]   Pager [phone]

## 2017-07-31 NOTE — CONSULTS
Full consult dictated    Possible rt femur infection  Rt femur neck fracture: s/p femur head resection, debridement  Per daughter had hair line fracture 3 years ago after fall, since than multiple other falls  In OR ?  Purulent material  Old fracture may have serve bacteria to set in???  OR Cx pending  Continue empiric Vancomycin Iv for now

## 2017-07-31 NOTE — PROGRESS NOTES
[facility]   Daily Progress Note    Date: 7/31/2017    Assessment/Plan: Jennifer Castillo is a 80 y.o. female with a h/o TN, 3 degree AV block s/p pacer placement who is hospitalized for hip fracture. 24 Hour Events: No events overnight. Hip Reda Boss-  Fall likely 2/2 to osteoporosis or possibly bacterial infection causing necrosis of femoral head. Per son's report, pt was able to ambulate with a walker and do many ADL semi-independently. Hemiarthroplasty was not completed as there was concern for infection as purulent discharge was found in femoral head during exploration. ID was consulted. Pt had a hairline fracture about 3 years ago that could have been the entry point for the infection. Pathologies were sent. - On Ancef 2 g and Vanc  - F/u ortho recommendations  - F/u wound and blood and ID recs  -Tylenol 1000 mg for pain   - Oxycodone 2.5 and 5 mg PRN  - Consult to palliative     Abnormal UA - Likely UTI, despite negative symptoms. Found to have UA with 1+ bacteria, moderate LE, and 5-10 WBC.  - F/u urine cx  - On Ancef, should provide adequate coverage. Glaucoma - Per children, only able to see shadows. History of failed cornea graft. - Continue home Dorzolamide-timolol  - Continue home brimonidine  - Continue home latanoprost      Hypothyroidism - TSH from 06/13/2016 was 1.140  - Continue home synthroid 50mcg daily      3rd degree heart block s/p pacer - Stable. - On tele      Hypertension - BP on admission 153/58. Has remained normotensive.  - Continuing home medications of labetalol 100mg and Norvasc 10mg. - Will continue to monitor at this time and readjust as BP's trend.      GERD - stable, not on home medicine.      Diabetes Type II  - A1c 6.5, stable. - Not on medications, okay with this considering pt age and current hospitalization     FEN/GI - Cardiac diet.    Activity - Bedrest  DVT prophylaxis - Lovenox  GI prophylaxis - Not indicated at this time  Disposition - Plan to d/c to TBD. Code Status - DNR    Pt seen and discussed with Dr. Ronal Lam (attending)    Yuriy Amaro MD  Family Medicine Resident  7/31/2017 11:11 AM         CC: Unable to obtain    Subjective  Per daugter, she did fine overnight. She complains of upper body stiffness and would like PT for upper body. Will discuss with ortho.       Inpatient Medications  Current Facility-Administered Medications   Medication Dose Route Frequency    vancomycin (VANCOCIN) 750 mg in 0.9% sodium chloride 250 mL IVPB  750 mg IntraVENous Q24H    acetaminophen (TYLENOL) tablet 1,000 mg  1,000 mg Oral Q8H    sodium chloride (NS) flush 5-10 mL  5-10 mL IntraVENous Q8H    sodium chloride (NS) flush 5-10 mL  5-10 mL IntraVENous PRN    naloxone (NARCAN) injection 0.4 mg  0.4 mg IntraVENous PRN    calcium-vitamin D (OS-REGNIE) 500 mg-200 unit tablet  1 Tab Oral TID WITH MEALS    senna-docusate (PERICOLACE) 8.6-50 mg per tablet 1 Tab  1 Tab Oral BID    polyethylene glycol (MIRALAX) packet 17 g  17 g Oral DAILY    bisacodyl (DULCOLAX) suppository 10 mg  10 mg Rectal DAILY PRN    oxyCODONE IR (ROXICODONE) tablet 2.5 mg  2.5 mg Oral Q4H PRN    oxyCODONE IR (ROXICODONE) tablet 5 mg  5 mg Oral Q4H PRN    ceFAZolin in 0.9% NS (ANCEF) IVPB soln 2 g  2 g IntraVENous Q8H    enoxaparin (LOVENOX) injection 30 mg  30 mg SubCUTAneous DAILY    amLODIPine (NORVASC) tablet 10 mg  10 mg Oral DAILY    brimonidine (ALPHAGAN P) 0.1 % ophthalmic solution 1 Drop  1 Drop Both Eyes BID    dorzolamide-timolol (COSOPT) 22.3-6.8 mg/mL ophthalmic solution 1 Drop  1 Drop Both Eyes BID    labetalol (NORMODYNE) tablet 100 mg  100 mg Oral BID    latanoprost (XALATAN) 0.005 % ophthalmic solution 1 Drop  1 Drop Both Eyes QHS    levothyroxine (SYNTHROID) tablet 50 mcg  50 mcg Oral ACB    glucose chewable tablet 16 g  4 Tab Oral PRN    dextrose (D50W) injection syrg 12.5-25 g  12.5-25 g IntraVENous PRN    glucagon (GLUCAGEN) injection 1 mg  1 mg IntraMUSCular PRN    sodium chloride (NS) flush 5-10 mL  5-10 mL IntraVENous Q8H    sodium chloride (NS) flush 5-10 mL  5-10 mL IntraVENous PRN    HYDROmorphone (PF) (DILAUDID) injection 0.2 mg  0.2 mg IntraVENous Q4H PRN         Allergies  Allergies   Allergen Reactions    Ace Inhibitors Cough    Cozaar [Losartan] Angioedema    Hydrochlorothiazide Other (comments) and Vertigo     Weak in legs, HYPONATREMIA (109 - Sept 2014 admission)    Niaspan [Niacin] Nausea Only    Pravastatin Other (comments)     dizzy    Prilosec [Omeprazole] Myalgia    Sular [Nisoldipine] Vertigo         Objective  Vitals:  Patient Vitals for the past 8 hrs:   Temp Pulse Resp BP SpO2   07/31/17 0839 98 °F (36.7 °C) 62 18 144/68 98 %         I/O:    Intake/Output Summary (Last 24 hours) at 07/31/17 1131  Last data filed at 07/31/17 2486   Gross per 24 hour   Intake                0 ml   Output             2250 ml   Net            -2250 ml     Last shift:       Last 3 shifts:    07/29 1901 - 07/31 0700  In: -   Out: 2857 [Urine:4050]    Physical Exam:  General: No acute distress. Alert. Cooperative. HEENT: Normocephalic. Atraumatic. Conjunctiva pink. Sclera white. PERRL. MMM   Respiratory: CTAB. No w/r/r/c.   Cardiovascular: RRR. Normal S1,S2. No m/r/g. 2+ pulses in DP bilaterally   GI: + bowel sounds. Nontender. No rebound tenderness or guarding. Nondistended   Extremities: Clean, dry bandage in place on right posterior hip. No edema. No tenderness. Bilaterally deisy boots.      Laboratory Data  Recent Results (from the past 12 hour(s))   CBC WITH AUTOMATED DIFF    Collection Time: 07/31/17  3:01 AM   Result Value Ref Range    WBC 9.4 3.6 - 11.0 K/uL    RBC 3.32 (L) 3.80 - 5.20 M/uL    HGB 9.9 (L) 11.5 - 16.0 g/dL    HCT 29.6 (L) 35.0 - 47.0 %    MCV 89.2 80.0 - 99.0 FL    MCH 29.8 26.0 - 34.0 PG    MCHC 33.4 30.0 - 36.5 g/dL    RDW 14.7 (H) 11.5 - 14.5 %    PLATELET 882 203 - 070 K/uL    NEUTROPHILS 69 32 - 75 %    LYMPHOCYTES 22 12 - 49 %    MONOCYTES 8 5 - 13 %    EOSINOPHILS 1 0 - 7 %    BASOPHILS 0 0 - 1 %    ABS. NEUTROPHILS 6.5 1.8 - 8.0 K/UL    ABS. LYMPHOCYTES 2.1 0.8 - 3.5 K/UL    ABS. MONOCYTES 0.8 0.0 - 1.0 K/UL    ABS. EOSINOPHILS 0.1 0.0 - 0.4 K/UL    ABS. BASOPHILS 0.0 0.0 - 0.1 K/UL   METABOLIC PANEL, BASIC    Collection Time: 07/31/17  3:01 AM   Result Value Ref Range    Sodium 138 136 - 145 mmol/L    Potassium 3.2 (L) 3.5 - 5.1 mmol/L    Chloride 103 97 - 108 mmol/L    CO2 28 21 - 32 mmol/L    Anion gap 7 5 - 15 mmol/L    Glucose 128 (H) 65 - 100 mg/dL    BUN 11 6 - 20 MG/DL    Creatinine 0.61 0.55 - 1.02 MG/DL    BUN/Creatinine ratio 18 12 - 20      GFR est AA >60 >60 ml/min/1.73m2    GFR est non-AA >60 >60 ml/min/1.73m2    Calcium 8.9 8.5 - 10.1 MG/DL   MAGNESIUM    Collection Time: 07/31/17  3:01 AM   Result Value Ref Range    Magnesium 1.7 1.6 - 2.4 mg/dL   PHOSPHORUS    Collection Time: 07/31/17  3:01 AM   Result Value Ref Range    Phosphorus 3.4 2.6 - 4.7 MG/DL       Imaging  No new. Hospital Problems:  Hospital Problems  Date Reviewed: 6/14/2017          Codes Class Noted POA    Hip fracture, right [facility]) ICD-10-CM: S72.001A  ICD-9-CM: 820.8  7/28/2017 Unknown            [address] Medicine Residency Attending Addendum:  I saw and evaluated the patient, performing the key elements of the service. I discussed the findings, assessment and plan with the resident and agree with the resident's findings and plan as documented in the resident's note.     The patient was seen on 7/31/17  No acute events overnight    Vitals:    08/03/17 2300 08/03/17 2312 08/04/17 0407 08/04/17 0844   BP:  109/60 114/65 114/65   Pulse: 66 70 67 67   Resp:  16 16    Temp:  98.2 °F (36.8 °C) 98.4 °F (36.9 °C)    SpO2:  98% 97%    Weight:       Height:       LMP: 03/05/1963     NAD      Assessment/Plan:   S/p right hip fracture  htn-stable  Continue current care    Hospital Problems  Date Reviewed: 6/14/2017 Codes Class Noted POA    Hyponatremia ICD-10-CM: E87.1  ICD-9-CM: 276.1  8/3/2017 No        * (Principal)Hip fracture, right ([address] ICD-10-CM: S72.001A  ICD-9-CM: 820.8  7/28/2017 Yes        HTN (hypertension) ICD-10-CM: I10  ICD-9-CM: 401.9  2/15/2011 Yes    Overview Signed 5/28/2013  9:13 AM by Conor Lundberg MD     Goal < 150 SBP             GERD (gastroesophageal reflux disease) ICD-10-CM: K21.9  ICD-9-CM: 530.81  2/15/2011 Yes    Overview Signed 4/3/2012  1:25 PM by Conor Lundberg MD      2008             Hypothyroid ICD-10-CM: E03.9  ICD-9-CM: 244.9  2/15/2011 Yes

## 2017-07-31 NOTE — CONSULTS
Palliative Medicine Consult  [place]: [phone] ([phone])    Patient Name: Krupa Mo  YOB: 1923    Date of Initial Consult: 7/31/17  Reason for Consult: Care decisions  Requesting Provider: Dr. Perla Tate   Primary Care Physician: Shar Bridges MD      SUMMARY:   Krupa Mo is a 80 y. o. with a past history of pacemaker, glaucoma, hearing loss, DM who was admitted on 7/28/2017 from home with a diagnosis of acute right femoral neck fracture. Current medical issues leading to Palliative Medicine involvement include: Care decisions. Chart reviewed/HPI-patient lives with her son and had a ground level fall at home. Found to have right hip fracture. Went to the OR and concern raised about infection in the hip so now definitive surgery delayed until determination if infection is present. PALLIATIVE DIAGNOSES:   1. GOC discussion  2. Debility  3. Right hip fracture  4. R/O infection of right hip       PLAN:   1. Scarlett(EBONIE) and I met with patient and her daughter(Sheri). Reviewed current situation with [place] and she has a clear understanding of the medical problems concerning her mom. 2. GOC-they remain hopeful there will be no infection so that can proceed with further surgery. If she does have infection and required long term abx therapy, [facility] would want to re-assess goals and have discussions with her siblings. In addition, family is looking to long term care placement as it has become more and more difficult for her son to care for her. Family had already had discussions with [facility]. They worry how patient will accept transfer to a facility because she really wants to return home  3. AMD-none completed. Patient with 8 children and all keep apprised of her medical issues. 4. Code status-addressed when she was admitted and decision was not attempts at resuscitation. We will assist with a DDNR if not already completed.   5. Symptom management-currently no issues with her pain management  6. Psychosocial-patient has her own room in her son's house. According to [facility], she has been relatively independent but has had increased falls. She does enjoy watching tv and is a \"news junkie\". Has 8 children who are supportive. She originally is from [place]. 7. Initial consult note routed to primary continuity provider  8. Communicated plan of care with: Palliative IDT, FM team       GOALS OF CARE / TREATMENT PREFERENCES:   [====Goals of Care====]  GOALS OF CARE:  Patient / health care proxy stated goals: hopefully fix her hip and then go to rehab      TREATMENT PREFERENCES:   Code Status: DNR    Advance Care Planning:  Advance Care Planning 7/31/2017   Patient's Healthcare Decision Maker is: Legal Next of Flaca Agrawal   Primary Decision Maker Name Corey Morales (equal authority with 7 other siblings)   Primary Decision Maker Phone Number [phone]   Primary Decision Maker Relationship to Patient Adult child   Confirm Advance Directive None       Other:    The palliative care team has discussed with patient / health care proxy about goals of care / treatment preferences for patient.  [====Goals of Care====]         HISTORY:     History obtained from: chart, [facility], patient really did not participate. CHIEF COMPLAINT: right hip pain    HPI/SUBJECTIVE:    The patient is:   [x] Verbal and participatory  [] Non-participatory due to:   Patient was quiet during the interview.  Denies pain right now     Clinical Pain Assessment (nonverbal scale for severity on nonverbal patients):   [++++ Clinical Pain Assessment++++]  [++++Pain Severity++++]: Pain: 1  [++++Pain Character++++]: sharp/throbbing  [++++Pain Duration++++]: days  [++++Pain Effect++++]:can not walk   [++++Pain Factors++++]: movement  [++++Pain Frequency++++]: intermittent  [++++Pain Location++++]: right hip  [++++ Clinical Pain Assessment++++]     FUNCTIONAL ASSESSMENT:     Palliative Performance Scale (PPS):  PPS: 50       PSYCHOSOCIAL/SPIRITUAL SCREENING:     Advance Care Planning:  Advance Care Planning 7/31/2017   Patient's Healthcare Decision Maker is: Legal Next of Flaca Agrawal   Primary Decision Maker Name Son Eliot Neil (equal authority with 7 other siblings)   Primary Decision Maker Phone Number [phone]   Primary Decision Maker Relationship to Patient Adult child   Confirm Advance Directive None        Any spiritual / Denominational concerns:  [] Yes /  [x] No    Caregiver Burnout:  [] Yes /  [x] No /  [] No Caregiver Present      Anticipatory grief assessment:   [x] Normal  / [] Maladaptive       ESAS Anxiety: Anxiety: 0    ESAS Depression: Depression: 0        REVIEW OF SYSTEMS:     Positive and pertinent negative findings in ROS are noted above in HPI. The following systems were [x] reviewed / [] unable to be reviewed as noted in HPI  Other findings are noted below. Systems: constitutional, ears/nose/mouth/throat, respiratory, gastrointestinal, genitourinary, musculoskeletal, integumentary, neurologic, psychiatric, endocrine. Positive findings noted below. Modified ESAS Completed by: provider   Fatigue: 3 Drowsiness: 1   Depression: 0 Pain: 1   Anxiety: 0 Nausea: 0   Anorexia: 0 Dyspnea: 0     Constipation: No     Stool Occurrence(s): 1        PHYSICAL EXAM:     From RN flowsheet:  Wt Readings from Last 3 Encounters:   07/31/17 99 lb 10.4 oz (45.2 kg)   06/14/17 93 lb 4 oz (42.3 kg)   04/18/17 97 lb (44 kg)     Blood pressure 141/63, pulse 62, temperature 98 °F (36.7 °C), resp. rate 16, height 4' 10\" (1.473 m), weight 99 lb 10.4 oz (45.2 kg), last menstrual period 03/05/1963, SpO2 99 %, not currently breastfeeding.     Pain Scale 1: Numeric (0 - 10)  Pain Intensity 1: 0  Pain Onset 1: Acute  Pain Location 1: Hip  Pain Orientation 1: Right  Pain Description 1: Aching  Pain Intervention(s) 1: Repositioned, Rest  Last bowel movement, if known:     Constitutional: appears comfortable, alert, speaks in swallow/quiet voice  Eyes: pupils equal, anicteric  ENMT: no nasal discharge, moist mucous membranes  Cardiovascular: regular rhythm, distal pulses intact  Respiratory: breathing not labored, symmetric  Gastrointestinal: soft non-tender, +bowel sounds  Musculoskeletal: TTP to right hip  Skin: warm, dry  Neurologic: following some commands, moving all extremities  Psychiatric: flat affect, no hallucinations  Other:       HISTORY:     Active Problems:    Hip fracture, right ([address] (7/28/2017)      Past Medical History:   Diagnosis Date    Atrioventricular block, complete ([address] 2/15/2011    GERD (gastroesophageal reflux disease) 2/15/2011    HTN (hypertension) 2/15/2011    Hypercholesteremia     Hypothyroid 2/15/2011    Murmur     Osteoporosis     Other and unspecified hyperlipidemia 2/15/2011    Pacemaker     Syncope and collapse 2/15/2011    Thyroid disease       Past Surgical History:   Procedure Laterality Date    HX GYN      hyst    HX HEENT      corneal tx    HX PACEMAKER      INS PPM/ICD LED DUAL ONLY  9/4/2014           Family History   Problem Relation Age of Onset    Alcohol abuse Brother       History reviewed, no pertinent family history.   Social History   Substance Use Topics    Smoking status: Never Smoker    Smokeless tobacco: Never Used    Alcohol use No     Allergies   Allergen Reactions    Ace Inhibitors Cough    Cozaar [Losartan] Angioedema    Hydrochlorothiazide Other (comments) and Vertigo     Weak in legs, HYPONATREMIA (109 - Sept 2014 admission)    Niaspan [Niacin] Nausea Only    Pravastatin Other (comments)     dizzy    Prilosec [Omeprazole] Myalgia    Sular [Nisoldipine] Vertigo      Current Facility-Administered Medications   Medication Dose Route Frequency    vancomycin (VANCOCIN) 750 mg in 0.9% sodium chloride 250 mL IVPB  750 mg IntraVENous Q24H    acetaminophen (TYLENOL) tablet 1,000 mg  1,000 mg Oral Q8H    sodium chloride (NS) flush 5-10 mL  5-10 mL IntraVENous Q8H    sodium chloride (NS) flush 5-10 mL  5-10 mL IntraVENous PRN    naloxone (NARCAN) injection 0.4 mg  0.4 mg IntraVENous PRN    calcium-vitamin D (OS-REGINE) 500 mg-200 unit tablet  1 Tab Oral TID WITH MEALS    senna-docusate (PERICOLACE) 8.6-50 mg per tablet 1 Tab  1 Tab Oral BID    polyethylene glycol (MIRALAX) packet 17 g  17 g Oral DAILY    bisacodyl (DULCOLAX) suppository 10 mg  10 mg Rectal DAILY PRN    oxyCODONE IR (ROXICODONE) tablet 2.5 mg  2.5 mg Oral Q4H PRN    oxyCODONE IR (ROXICODONE) tablet 5 mg  5 mg Oral Q4H PRN    ceFAZolin in 0.9% NS (ANCEF) IVPB soln 2 g  2 g IntraVENous Q8H    enoxaparin (LOVENOX) injection 30 mg  30 mg SubCUTAneous DAILY    amLODIPine (NORVASC) tablet 10 mg  10 mg Oral DAILY    brimonidine (ALPHAGAN P) 0.1 % ophthalmic solution 1 Drop  1 Drop Both Eyes BID    dorzolamide-timolol (COSOPT) 22.3-6.8 mg/mL ophthalmic solution 1 Drop  1 Drop Both Eyes BID    labetalol (NORMODYNE) tablet 100 mg  100 mg Oral BID    latanoprost (XALATAN) 0.005 % ophthalmic solution 1 Drop  1 Drop Both Eyes QHS    levothyroxine (SYNTHROID) tablet 50 mcg  50 mcg Oral ACB    glucose chewable tablet 16 g  4 Tab Oral PRN    dextrose (D50W) injection syrg 12.5-25 g  12.5-25 g IntraVENous PRN    glucagon (GLUCAGEN) injection 1 mg  1 mg IntraMUSCular PRN    sodium chloride (NS) flush 5-10 mL  5-10 mL IntraVENous Q8H    sodium chloride (NS) flush 5-10 mL  5-10 mL IntraVENous PRN          LAB AND IMAGING FINDINGS:     Lab Results   Component Value Date/Time    WBC 9.4 07/31/2017 03:01 AM    HGB 9.9 07/31/2017 03:01 AM    PLATELET 066 04/17/2374 03:01 AM     Lab Results   Component Value Date/Time    Sodium 138 07/31/2017 03:01 AM    Potassium 3.2 07/31/2017 03:01 AM    Chloride 103 07/31/2017 03:01 AM    CO2 28 07/31/2017 03:01 AM    BUN 11 07/31/2017 03:01 AM    Creatinine 0.61 07/31/2017 03:01 AM    Calcium 8.9 07/31/2017 03:01 AM    Magnesium 1.7 07/31/2017 03:01 AM    Phosphorus 3.4 07/31/2017 03:01 AM      Lab Results   Component Value Date/Time    AST (SGOT) 15 06/13/2016 01:55 PM    Alk. phosphatase 78 06/13/2016 01:55 PM    Protein, total 6.4 06/13/2016 01:55 PM    Albumin 4.2 06/13/2016 01:55 PM    Globulin 3.4 09/06/2014 04:50 AM     No results found for: INR, PTMR, PTP, PT1, PT2, APTT   No results found for: IRON, FE, TIBC, IBCT, PSAT, FERR   No results found for: PH, PCO2, PO2  No components found for: GLPOC   No results found for: CPK, CKMB             Total time: 50  Counseling / coordination time, spent as noted above: 45  > 50% counseling / coordination?: yes    Prolonged service was provided for  []30 min   []75 min in face to face time in the presence of the patient, spent as noted above. Time Start:   Time End:   Note: this can only be billed with 68274 (initial) or 56909 (follow up). If multiple start / stop times, list each separately.

## 2017-07-31 NOTE — PROGRESS NOTES
Skin Note:    Inspected sacral area when turning patient to left side with assistance from PCT, no redness or sign of breakdown

## 2017-07-31 NOTE — PROGRESS NOTES
Orthopaedic Progress Note  Post Op day: 2 Days Post-Op    2017 4:33 PM     Patient: Shahbaz Howard MRN: [id number]  SSN: [id number]    YOB: 1923  Age: 80 y.o. Sex: female      Admit date:  2017  Date of Surgery:  2017   Procedures:  Procedure(s):  RIGHT HIP HEMIARTHROPLASTY/BIOMET  Admitting Physician:  Deidra Cheema MD   Surgeon:  Zeeshan Morales) and Role:     * Katrina Bull MD - Primary    Consulting Physician(s): Treatment Team: Attending Provider: Deidra Cheema MD; Consulting Provider: Warren Downing NP; Consulting Provider: Mitchel Pak MD; Consulting Provider: Khadijah Pickering MD; Consulting Provider: aKtrina Bull MD; Consulting Provider: Jennifer Holt MD; Consulting Provider: Fredrick Schwarz MD; Care Manager: Julius Canavan    SUBJECTIVE:     Shahbaz Howard is a 80 y.o. female is 2 Days Post-Op s/p Procedure(s): RIGHT HIP HEMIARTHROPLASTY/BIOMET with an appropriate level of post-operative pain. Daughter present in room, discussed case with her. Daughter states patient can understand English but does not speak it very well. No complaints of nausea, vomiting, dizziness, lightheadedness, chest pain, or shortness of breath. OBJECTIVE:       Physical Exam:  General: Alert, cooperative, no distress. Respiratory: Respirations unlabored  Neurological:  Neurovascular exam within normal limits. Motor: + DF/PF. Musculoskeletal: Right calf tender to palpation; left calf non-tender. Dressing/Wound:  Clean, dry and intact. No significant erythema or swelling.       Vital Signs:      Patient Vitals for the past 8 hrs:   BP Temp Pulse Resp SpO2 Height Weight   17 1300 141/63 - 62 16 99 % - -   17 1102 - - - - - 4' 10\" (1.473 m) 45.2 kg (99 lb 10.4 oz)   17 0839 144/68 98 °F (36.7 °C) 62 18 98 % - -                                          Temp (24hrs), Av.4 °F (36.9 °C), Min:97.9 °F (36.6 °C), Max:98.9 °F (37.2 °C)      Labs: Recent Labs      07/31/17   0301   HCT  29.6*   HGB  9.9*     Lab Results   Component Value Date/Time    Sodium 138 07/31/2017 03:01 AM    Potassium 3.2 07/31/2017 03:01 AM    Chloride 103 07/31/2017 03:01 AM    CO2 28 07/31/2017 03:01 AM    Glucose 128 07/31/2017 03:01 AM    BUN 11 07/31/2017 03:01 AM    Creatinine 0.61 07/31/2017 03:01 AM    Calcium 8.9 07/31/2017 03:01 AM       ASSESSMENT / PLAN:   Active Problems:    Hip fracture, right ([address] (7/28/2017)         S/p femur head resection, debridement Wound cultures pending. No growth thus far.  Continue bedrest.   DVT Prophylaxis Lovenox qday   Calf pain, right leg Check venus duplex to r/o DVT   Pain Continue current regimen     Signed By: Karon Nunes, TR    RN, MSN, FNP-BC  Orthopedic Nurse Practitioner  [facility]

## 2017-07-31 NOTE — ROUTINE PROCESS
Orthopedic & Surgical Nursing Communication Tool      7:22 AM  7/31/2017     Bedside and Verbal shift change report given to Ilya Mack RN (incoming nurse) by John White RN (outgoing nurse) on Gareth Rodriguez a 80 y.o. female who was admitted on 7/28/2017  1:28 PM. Report included the following information SBAR, Procedure Summary, Intake/Output and MAR. Opportunity for questions and clarifications were given to the incoming nurse. Patient's bed is in low position, side rails x3, family at bedside, call bell within reach and patient not in distress. Hourly rounding performed throughout shift.     John White RN

## 2017-08-01 ENCOUNTER — ANESTHESIA EVENT (OUTPATIENT)
Dept: SURGERY | Age: 82
DRG: 470 | End: 2017-08-01
Payer: MEDICARE

## 2017-08-01 LAB
ANION GAP BLD CALC-SCNC: 6 MMOL/L (ref 5–15)
BASOPHILS # BLD AUTO: 0 K/UL (ref 0–0.1)
BASOPHILS # BLD: 0 % (ref 0–1)
BUN SERPL-MCNC: 15 MG/DL (ref 6–20)
BUN/CREAT SERPL: 29 (ref 12–20)
CALCIUM SERPL-MCNC: 9.1 MG/DL (ref 8.5–10.1)
CHLORIDE SERPL-SCNC: 103 MMOL/L (ref 97–108)
CO2 SERPL-SCNC: 28 MMOL/L (ref 21–32)
CREAT SERPL-MCNC: 0.52 MG/DL (ref 0.55–1.02)
DATE LAST DOSE: NORMAL
EOSINOPHIL # BLD: 0.3 K/UL (ref 0–0.4)
EOSINOPHIL NFR BLD: 3 % (ref 0–7)
ERYTHROCYTE [DISTWIDTH] IN BLOOD BY AUTOMATED COUNT: 14.8 % (ref 11.5–14.5)
GLUCOSE SERPL-MCNC: 116 MG/DL (ref 65–100)
HCT VFR BLD AUTO: 32.4 % (ref 35–47)
HGB BLD-MCNC: 10.5 G/DL (ref 11.5–16)
LYMPHOCYTES # BLD AUTO: 22 % (ref 12–49)
LYMPHOCYTES # BLD: 2.2 K/UL (ref 0.8–3.5)
MAGNESIUM SERPL-MCNC: 1.6 MG/DL (ref 1.6–2.4)
MCH RBC QN AUTO: 29.5 PG (ref 26–34)
MCHC RBC AUTO-ENTMCNC: 32.4 G/DL (ref 30–36.5)
MCV RBC AUTO: 91 FL (ref 80–99)
MONOCYTES # BLD: 0.7 K/UL (ref 0–1)
MONOCYTES NFR BLD AUTO: 7 % (ref 5–13)
NEUTS SEG # BLD: 6.9 K/UL (ref 1.8–8)
NEUTS SEG NFR BLD AUTO: 68 % (ref 32–75)
NRBC # BLD: 0 K/UL (ref 0–0.01)
NRBC BLD-RTO: 0 PER 100 WBC
PHOSPHATE SERPL-MCNC: 3.8 MG/DL (ref 2.6–4.7)
PLATELET # BLD AUTO: 309 K/UL (ref 150–400)
POTASSIUM SERPL-SCNC: 3.9 MMOL/L (ref 3.5–5.1)
RBC # BLD AUTO: 3.56 M/UL (ref 3.8–5.2)
RBC MORPH BLD: ABNORMAL
REPORTED DOSE,DOSE: NORMAL UNITS
REPORTED DOSE/TIME,TMG: 1800
SODIUM SERPL-SCNC: 137 MMOL/L (ref 136–145)
VANCOMYCIN TROUGH SERPL-MCNC: 6.3 UG/ML (ref 5–10)
WBC # BLD AUTO: 10.1 K/UL (ref 3.6–11)

## 2017-08-01 PROCEDURE — 80202 ASSAY OF VANCOMYCIN: CPT | Performed by: INTERNAL MEDICINE

## 2017-08-01 PROCEDURE — 36415 COLL VENOUS BLD VENIPUNCTURE: CPT | Performed by: ORTHOPAEDIC SURGERY

## 2017-08-01 PROCEDURE — 65660000000 HC RM CCU STEPDOWN

## 2017-08-01 PROCEDURE — 74011250637 HC RX REV CODE- 250/637: Performed by: ORTHOPAEDIC SURGERY

## 2017-08-01 PROCEDURE — 74011250636 HC RX REV CODE- 250/636: Performed by: INTERNAL MEDICINE

## 2017-08-01 PROCEDURE — 85025 COMPLETE CBC W/AUTO DIFF WBC: CPT | Performed by: ORTHOPAEDIC SURGERY

## 2017-08-01 PROCEDURE — 74011250636 HC RX REV CODE- 250/636: Performed by: ORTHOPAEDIC SURGERY

## 2017-08-01 PROCEDURE — 80048 BASIC METABOLIC PNL TOTAL CA: CPT | Performed by: ORTHOPAEDIC SURGERY

## 2017-08-01 PROCEDURE — 74011250637 HC RX REV CODE- 250/637: Performed by: STUDENT IN AN ORGANIZED HEALTH CARE EDUCATION/TRAINING PROGRAM

## 2017-08-01 PROCEDURE — 83735 ASSAY OF MAGNESIUM: CPT | Performed by: ORTHOPAEDIC SURGERY

## 2017-08-01 PROCEDURE — 84100 ASSAY OF PHOSPHORUS: CPT | Performed by: ORTHOPAEDIC SURGERY

## 2017-08-01 RX ORDER — SODIUM CHLORIDE 9 MG/ML
250 INJECTION, SOLUTION INTRAVENOUS AS NEEDED
Status: DISCONTINUED | OUTPATIENT
Start: 2017-08-01 | End: 2017-08-04 | Stop reason: HOSPADM

## 2017-08-01 RX ORDER — SODIUM CHLORIDE 0.9 % (FLUSH) 0.9 %
5-10 SYRINGE (ML) INJECTION AS NEEDED
Status: CANCELLED | OUTPATIENT
Start: 2017-08-01

## 2017-08-01 RX ORDER — ONDANSETRON 2 MG/ML
4 INJECTION INTRAMUSCULAR; INTRAVENOUS AS NEEDED
Status: CANCELLED | OUTPATIENT
Start: 2017-08-01

## 2017-08-01 RX ORDER — DIPHENHYDRAMINE HYDROCHLORIDE 50 MG/ML
12.5 INJECTION, SOLUTION INTRAMUSCULAR; INTRAVENOUS AS NEEDED
Status: CANCELLED | OUTPATIENT
Start: 2017-08-01 | End: 2017-08-01

## 2017-08-01 RX ORDER — HYDRALAZINE HYDROCHLORIDE 20 MG/ML
10 INJECTION INTRAMUSCULAR; INTRAVENOUS
Status: DISCONTINUED | OUTPATIENT
Start: 2017-08-01 | End: 2017-08-04 | Stop reason: HOSPADM

## 2017-08-01 RX ORDER — SODIUM CHLORIDE, SODIUM LACTATE, POTASSIUM CHLORIDE, CALCIUM CHLORIDE 600; 310; 30; 20 MG/100ML; MG/100ML; MG/100ML; MG/100ML
125 INJECTION, SOLUTION INTRAVENOUS CONTINUOUS
Status: CANCELLED | OUTPATIENT
Start: 2017-08-01

## 2017-08-01 RX ORDER — HYDROMORPHONE HYDROCHLORIDE 1 MG/ML
.25-1 INJECTION, SOLUTION INTRAMUSCULAR; INTRAVENOUS; SUBCUTANEOUS
Status: CANCELLED | OUTPATIENT
Start: 2017-08-01

## 2017-08-01 RX ADMIN — ACETAMINOPHEN 1000 MG: 500 TABLET ORAL at 05:48

## 2017-08-01 RX ADMIN — ENOXAPARIN SODIUM 30 MG: 30 INJECTION SUBCUTANEOUS at 08:56

## 2017-08-01 RX ADMIN — DORZOLAMIDE HYDROCHLORIDE AND TIMOLOL MALEATE 1 DROP: 20; 5 SOLUTION/ DROPS OPHTHALMIC at 21:23

## 2017-08-01 RX ADMIN — Medication 10 ML: at 21:24

## 2017-08-01 RX ADMIN — OYSTER SHELL CALCIUM WITH VITAMIN D 1 TABLET: 500; 200 TABLET, FILM COATED ORAL at 17:54

## 2017-08-01 RX ADMIN — BRIMONIDINE TARTRATE 1 DROP: 1 SOLUTION/ DROPS OPHTHALMIC at 21:23

## 2017-08-01 RX ADMIN — CEFAZOLIN 2 G: 1 INJECTION, POWDER, FOR SOLUTION INTRAMUSCULAR; INTRAVENOUS; PARENTERAL at 23:54

## 2017-08-01 RX ADMIN — POLYETHYLENE GLYCOL 3350 17 G: 17 POWDER, FOR SOLUTION ORAL at 08:56

## 2017-08-01 RX ADMIN — OYSTER SHELL CALCIUM WITH VITAMIN D 1 TABLET: 500; 200 TABLET, FILM COATED ORAL at 12:40

## 2017-08-01 RX ADMIN — OXYCODONE HYDROCHLORIDE 5 MG: 5 TABLET ORAL at 03:33

## 2017-08-01 RX ADMIN — OYSTER SHELL CALCIUM WITH VITAMIN D 1 TABLET: 500; 200 TABLET, FILM COATED ORAL at 08:56

## 2017-08-01 RX ADMIN — Medication 10 ML: at 14:26

## 2017-08-01 RX ADMIN — BRIMONIDINE TARTRATE 1 DROP: 1 SOLUTION/ DROPS OPHTHALMIC at 08:57

## 2017-08-01 RX ADMIN — DOCUSATE SODIUM AND SENNOSIDES 1 TABLET: 50; 8.6 TABLET, FILM COATED ORAL at 09:00

## 2017-08-01 RX ADMIN — LABETALOL HCL 100 MG: 100 TABLET, FILM COATED ORAL at 08:56

## 2017-08-01 RX ADMIN — LEVOTHYROXINE SODIUM 50 MCG: 0.05 TABLET ORAL at 05:49

## 2017-08-01 RX ADMIN — LATANOPROST 1 DROP: 50 SOLUTION OPHTHALMIC at 21:23

## 2017-08-01 RX ADMIN — CEFAZOLIN 2 G: 1 INJECTION, POWDER, FOR SOLUTION INTRAMUSCULAR; INTRAVENOUS; PARENTERAL at 12:40

## 2017-08-01 RX ADMIN — ACETAMINOPHEN 1000 MG: 500 TABLET ORAL at 21:23

## 2017-08-01 RX ADMIN — LABETALOL HCL 100 MG: 100 TABLET, FILM COATED ORAL at 17:54

## 2017-08-01 RX ADMIN — DORZOLAMIDE HYDROCHLORIDE AND TIMOLOL MALEATE 1 DROP: 20; 5 SOLUTION/ DROPS OPHTHALMIC at 08:57

## 2017-08-01 RX ADMIN — VANCOMYCIN HYDROCHLORIDE 750 MG: 10 INJECTION, POWDER, LYOPHILIZED, FOR SOLUTION INTRAVENOUS at 20:04

## 2017-08-01 RX ADMIN — AMLODIPINE BESYLATE 10 MG: 5 TABLET ORAL at 17:54

## 2017-08-01 RX ADMIN — ACETAMINOPHEN 1000 MG: 500 TABLET ORAL at 14:26

## 2017-08-01 RX ADMIN — Medication 10 ML: at 05:49

## 2017-08-01 RX ADMIN — CEFAZOLIN 2 G: 1 INJECTION, POWDER, FOR SOLUTION INTRAMUSCULAR; INTRAVENOUS; PARENTERAL at 03:32

## 2017-08-01 NOTE — DISCHARGE SUMMARY
[facility] FAMILY MEDICINE RESIDENCY PROGRAM   Discharge/Transfer/Off-Service Note      Date: August 4, 2017    Rob Shi  MRN: [id number]  YOB: 1923  Age: 80 y.o. Date of admission: 7/28/2017     Date of discharge/transfer: 8/4/2017    Primary Care Physician: Felix Lewis MD     Attending physician at admission: Dr. Ricardo Rose     Attending physician at discharge/transfer: Dr. Chava Mendiola     Resident physician at discharge/transfer: Parish Ibrahim MD     Consultants during hospitalization:  Orthopedics and ID     Admission diagnoses:   Hip fracture, right [facility])  [address]    Discharge diagnoses:  Hospital Problems  Date Reviewed: 6/14/2017          Codes Class Noted POA    Hyponatremia ICD-10-CM: E87.1  ICD-9-CM: 276.1  8/3/2017 No        * (Principal)Hip fracture, right [facility]) ICD-10-CM: S72.001A  ICD-9-CM: 820.8  7/28/2017 Yes             [address] [place] [address] Road:  History of Present Illness per admitting resident Dr. Roxanne Knowles: Darrell Sunshine is a 80 y.o. female with a h/o HTN, GERD, Hypothyroidism, 3rd degree AV block s/p pacer, Glaucoma, and osteoporosis who presents to the ER complaining of right-sided pelvic pain. History is per daughter and son (main caretakers). Per daughter, whom the patient was visiting for a 3 week stay in [place], reports that the patient was transferring from her walker to the toilet when she let go of her walker and fell to the floor on her bottom, catching herself on both elbows. No reported LOC. Initially after the fall the pt could ambulate fine however, since two days ago the pt reports being unable to get up. Pt reports she is unable to ambulate due to right-sided pelvic pain. Daughter states she has been giving patient 200 mg Advil for pain which has not helped greatly. Family denies any fever, cough, chest pain, abdominal pain, SOB. [facility] course with associated diagnosis:  R Hip Fracture s/p hemiarthroplasty POD#2 - stable on d/c -  fall 3yrs ago per son with underlying femoral head osteoporesis. Negative osteomyelitis on exam. Patient underwent an initial hip surgery where a thick yellowish substance was found on the Rt hip area. It was necessary to sample the fluid, culture, and close the wound without repair at that moment. As the cultures were negative, patient underwent a second Rt Hip surgery and hemiarthroplasty was performed successfully. Patient tolerating well the procedure, pain well managed, and working well with PT/OT. It was recommended to discharge to SNF for continued care. Will follow up with PCP and Ortho team.    - F/U with PCP for transition of care visit on 8/10/2017  - F/U with ortho as outpatient in 2wks (need Xray hip/pelvis on visit)  - Continue Lovenox for 4wks per ortho  - Weight bearing as tolerated  - Continue pain management with Percocet and Tylenol     Asymptomatic Hyponatremia - Improving -  Developed Post-Op. Mostly likely from fluid replacement during surgery. Negative urine studies for SIADH. Will follow up with PCP.  - Repeat BMP on PCP follow up    HTN - stable  - Continue home Labetalol 100 mg and Norvasc 10 mg     3rd degree AV heart block - stable (s/p pacer placement)       Glaucoma - stable   - Continue home Dorzolamide-Timolol, Brimonidine, Latanoprost     Hypothyroidism - Stable - TSH 1.14 (6/13/2016)   - Continue home Synthroid 50 mcg daily      GERD - stable  - Start Pepcid at discharge for prophylaxis     NIDDM type II - A1c 6.5, stable, not on any medication.      Asymptomatic UTI/colonization - resolved     FOLLOW-UP CARE RECOMMENDATIONS:  Follow-up Information     Follow up With Details Comments Contact [facility]   [address] Bishnu Remy MD Go on 8/10/2017 Follow up hospital stay.  Appointment at [address] [place] [address],[address] [facility]      Shivani Pretty MD Schedule an appointment as soon as possible for a visit in 2 weeks Follow up hip surgery. Make appointment as recommended Elsy3 Madhu Braxton  [phone]          FOLLOW-UP TESTS RECOMMENDED:   · Hip XR as recommended by Ortho BMP to follow up Na levels    PENDING TEST RESULTS:  At the time of discharge the following test results are still pending: None. Please review these results as they become available. Specific symptoms to watch for: chest pain, shortness of breath, fever, chills, nausea, vomiting, diarrhea, change in mentation, falling, weakness, bleeding. DIET:  Regular Diet    ACTIVITY:  Per rehab and as weight tolerated per ortho instructions    WOUND CARE: As recommended by ortho instructions     EQUIPMENT needed:  As recommended by PT/OT    Physical exam at discharge:   Visit Vitals    /65 (BP 1 Location: Left arm, BP Patient Position: At rest)    Pulse 67    Temp 98.4 °F (36.9 °C)    Resp 16    Ht 4' 10\" (1.473 m)    Wt 99 lb 3.2 oz (45 kg)    LMP 03/05/1963    SpO2 97%    Breastfeeding No    BMI 20.73 kg/m2       Physical Exam  General:   Alert, cooperative, no acute distress   Head:   Atraumatic. Eye:   clear conjunctiva, slight early cataract b/l   ENT:  Oral mucosa normal   Neck:  Supple, trachea midline, no adenopathy   Lungs:   Clear to auscultation bilaterally    Heart:   Regular rhythm, no murmur   Abdomen:    Soft, non-tender. No masses or organomegaly    Extremities:  TTP on right hip, dressing clean (followed up with ortho). No edema or DVT signs   Skin:  Warm and dry. No rashes or lesions   Neurologic:  Oriented.   No focal deficits       Urinary catheter:  external catheter (purewick)       Condition at discharge: Stable    Disposition: [facility]    Recommended follow-up tests after discharge: BMP, Hip XR     Diet: Diabetic diet     Activity: As tolerated per PT and surgery recommendations    Labs:  Recent Labs      08/04/17   0100  08/03/17   0046  08/02/17   0041   WBC  8.2  12.6*  8.1   HGB  9.2*  10.4*  11.3*   HCT  27.1*  31.8*  32.8*   PLT  371  357  362     Recent Labs      08/04/17   0100  08/03/17   0046  08/02/17   0041   NA  132*  131*  135*   K  4.0  4.4  3.5   CL  99  98  101   CO2  29  26  30   BUN  18  13  13   CREA  0.56  0.61  0.57   GLU  124*  214*  168*   CA  8.8  8.6  8.9   MG  1.8  1.7  1.5*   PHOS  3.6  3.9  3.9     No results for input(s): SGOT, GPT, ALT, AP, TBIL, TBILI, TP, ALB, GLOB, GGT, AML, LPSE in the last 72 hours. No lab exists for component: AMYP, HLPSE  No results for input(s): INR, PTP, APTT in the last 72 hours. No lab exists for component: INREXT, INREXT   No results for input(s): FE, TIBC, PSAT, FERR in the last 72 hours. No results for input(s): PH, PCO2, PO2 in the last 72 hours. No results for input(s): CPK, CKMB in the last 72 hours.     No lab exists for component: TROPONINI  Lab Results   Component Value Date/Time    Glucose (POC) 137 07/29/2017 07:19 AM    Glucose (POC) 112 07/28/2017 09:58 PM    Glucose (POC) 186 07/28/2017 05:07 PM    Glucose (POC) 105 10/11/2009 12:42 PM    Glucose  01/09/2012 09:08 AM        All Micro Results     Procedure Component Value Units Date/Time    CULTURE, BLOOD [[id number]] Collected:  07/29/17 1524    Order Status:  Completed Specimen:  Blood from Blood Updated:  08/03/17 0853     Special Requests: NO SPECIAL REQUESTS        Culture result: NO GROWTH 5 DAYS       CULTURE, URINE [[id number]] Collected:  07/30/17 0406    Order Status:  Completed Specimen:  Urine Updated:  07/31/17 1138     Special Requests: --        NO SPECIAL REQUESTS  Reflexed from [id number]       [place] Count --        <10,000  COLONIES/mL       Culture result: NO SIGNIFICANT GROWTH       CULTURE, ANAEROBIC [[id number]] Collected:  07/29/17 1158    Order Status:  Completed Specimen:  Surgical Specimen Updated:  07/31/17 0901     Special Requests: -- RIGHT  HIP       Culture result:         No growth thus far, holding 14 days. CULTURE, Nik Marannalise STAIN [[id number]] Collected:  07/29/17 1158    Order Status:  Completed Specimen:  Surgical Specimen Updated:  07/31/17 0900     Special Requests: --        RIGHT  HIP       GRAM STAIN RARE WBCS SEEN         NO DEFINITE ORGANISM SEEN        Culture result:         No growth thus far, holding 14 days. Diagnostic Studies and Procedures:    EXAM:  XR HIP RT W OR WO PELV 2-3 VWS     INDICATION:   hip pain fall cant walk.     COMPARISON: 2016.     FINDINGS: An AP view of the pelvis and a frogleg lateral view of the right hip  demonstrate a subcapital right proximal femur fracture with superior  displacement of the distal fracture fragment and varus angulation. There are  old right superior and inferior pubic ramus fractures.     IMPRESSION  IMPRESSION:  Subcapital right proximal femur fracture. EXAM:  XR PELV AP ONLY     INDICATION:   surgery     COMPARISON: July 28, 2017.     FINDINGS: 2 frontal views of the pelvis demonstrate interval resection of the  right femoral head and neck. Proximal migration of the right femoral remnant is  demonstrated in the interval with the lesser trochanter now projecting over the  inferior margin of the right acetabulum. No interval fracture is shown. The  bones are severely osteopenic.       IMPRESSION  IMPRESSION: Interval resection of right femoral head. TYPE OF TEST: Peripheral Venous Testing     REASON FOR TEST  Pain in limb, Limb swelling     Right Leg:-  Deep venous thrombosis:           No  Superficial venous thrombosis:    No  Deep venous insufficiency:        No  Superficial venous insufficiency: No        INTERPRETATION/FINDINGS  PROCEDURE:  RIGHT LOWER EXTREMITY  VENOUS DUPLEX. Evaluation of lower  extremity veins with ultrasound (B-mode imaging, pulsed Doppler, color   Doppler).   Includes the common femoral, deep femoral, femoral,  popliteal, posterior tibial, peroneal, and great saphenous veins. Other veins, for example the gastrocnemius and soleal veins, may also  be visualized.     FINDINGS: Gray scale and color flow duplex images of the veins in the  right lower extremity demonstrate normal compressibility, spontaneous  and augmented flow profiles, and absence of filling defects throughout   the deep and superficial veins in the right lower extremity.     CONCLUSION: Right lower extremity venous duplex negative for deep  venous thrombosis or thrombophlebitis. Left common femoral vein is  thrombus free.     ADDITIONAL COMMENTS     I have personally reviewed the data relevant to the interpretation of  this  study.     TECHNOLOGIST: Noel Trejo. Paulino  Signed: 07/31/2017 06:47 PM     PHYSICIAN: Ivy Chopra MD  Signed: 08/01/2017 06:24 AM    XR PELV AP ONLY  Indication: Surgery.     Portable pelvis demonstrates patient to be status post right hip  hemiarthroplasty. Alignment is anatomic.     IMPRESSION  IMPRESSION:  1. Anatomic alignment right hip hemiarthroplasty. Discharge/Transfer Medications:  Current Discharge Medication List      CONTINUE these medications which have NOT CHANGED    Details   therapeutic multivitamin (THERAGRAN) tablet Take 1 Tab by mouth daily. levothyroxine (SYNTHROID) 50 mcg tablet TAKE ONE TABLET BY MOUTH DAILY BEFORE BREAKFAST  Qty: 90 Tab, Refills: 0    Associated Diagnoses: Acquired hypothyroidism      amLODIPine (NORVASC) 10 mg tablet TAKE ONE TABLET BY MOUTH DAILY  Qty: 30 Tab, Refills: 7    Associated Diagnoses: Secondary hypertension      labetalol (NORMODYNE) 100 mg tablet TAKE ONE TABLET BY MOUTH EVERY 12 HOURS  Qty: 60 Tab, Refills: 10      latanoprost (XALATAN) 0.005 % ophthalmic solution Administer 1 drop to both eyes nightly. dorzolamide-timolol (COSOPT) 2-0.5 % ophthalmic solution Administer 1 drop to both eyes two (2) times a day.       brimonidine (ALPHAGAN P) 0.1 % ophthalmic solution Administer 1 drop to both eyes two (2) times a day. omega-3 fatty acids-vitamin e (FISH OIL) 1,000 mg Cap Take 1 capsule by mouth daily. Associated Diagnoses: HTN (hypertension)      bisacodyl (DULCOLAX, BISACODYL,) 5 mg EC tablet Take 5 mg by mouth as needed for Constipation. Follow up plans/appointments:   Follow-up Information     None           Discharge instructions to patient/family  Please seek medical attention for any new or worsening symptoms particularly fever, chest pain, shortness of breath, abdominal pain, nausea, vomiting      Johnnie Whitehead MD  Family Medicine Resident      Cc: Carolina Gracia MD

## 2017-08-01 NOTE — PROGRESS NOTES
Bedside shift change report given to Vanessa (oncoming nurse) by Silvia Carpenter (offgoing nurse). Report included the following information SBAR, Kardex, Procedure Summary, MAR and Recent Results.

## 2017-08-01 NOTE — PROGRESS NOTES
[facility] Infectious Diseases Progress Note  Melissa Bradshaw MD, WILL Gustafson DO, TR     [address] [facility], [address] [place] [address], [address] [place] [address]  Office: [phone]   Fax: [phone]    2017      Assessment & Plan: Vancomycin   1. ? Rt femur infection: s/p fall, found to have rt femur neck fracture, went to OR for rt hemiarthroplasty, found purulent looking material, sent to OR, Cx so far neg. Per daughter, 3 years ago had fall,and had hair line fracture, and so ? Liquifed bone intracapsular? On empiric Vancomycin. 2. Old age, failure to thrive, recurrent fall  3. Hx AVB, s/p AICD          Subjective:     Sleeping comfortably. Objective:     Vitals:   Visit Vitals    /74 (BP 1 Location: Left arm, BP Patient Position: At rest)    Pulse 71    Temp 97.7 °F (36.5 °C)    Resp 17    Ht 4' 10\" (1.473 m)    Wt 45.2 kg (99 lb 10.4 oz)    LMP 1963    SpO2 98%    Breastfeeding No    BMI 20.83 kg/m2     Temp (24hrs), Av.1 °F (36.7 °C), Min:97.4 °F (36.3 °C), Max:99 °F (37.2 °C)    Recent Labs      17   0621  17   0301  17   0406   NA  137  138  135*   K  3.9  3.2*  3.9   CL  103  103  102   CO2  28  28  25   BUN  15  11  10   CREA  0.52*  0.61  0.54*   GLU  116*  128*  144*   PHOS  3.8  3.4   --    MG  1.6  1.7   --    WBC  10.1  9.4  8.5   HGB  10.5*  9.9*  10.4*   HCT  32.4*  29.6*  30.6*   PLT  309  307  295       Exam:    GENERAL: Older female lying comfortably in the bed, not in any apparent distress. HEENT: Mild pallor. No icterus. Pupils are reactive to the right. CHEST: Clear to auscultation bilaterally with decreased air entry at the bases. Poor inspiratory effort. HEART: Regular rate and rhythm, S1, S2.   ABDOMEN: Soft, nontender. Bowel sounds are present. Right-sided surgical site clean, dry, and intact.     Cultures:     Lab Results   Component Value Date/Time    Culture result: NO SIGNIFICANT GROWTH 2017 04:06 AM    Culture result: NO GROWTH 3 DAYS 07/29/2017 03:24 PM    Culture result: No growth thus far, holding 14 days. 07/29/2017 11:58 AM    Culture result: No growth thus far, holding 14 days.  07/29/2017 11:58 AM       Radiology:   Reviewed      Medications:        Current Facility-Administered Medications   Medication Dose Route Frequency Last Dose    hydrALAZINE (APRESOLINE) 20 mg/mL injection 10 mg  10 mg IntraVENous Q6H PRN      Vancomycin Trough 17:30  1 Each Other ONCE      vancomycin (VANCOCIN) 750 mg in 0.9% sodium chloride 250 mL IVPB  750 mg IntraVENous Q24H 750 mg at 07/31/17 1842    acetaminophen (TYLENOL) tablet 1,000 mg  1,000 mg Oral Q8H 1,000 mg at 08/01/17 1426    sodium chloride (NS) flush 5-10 mL  5-10 mL IntraVENous Q8H 10 mL at 08/01/17 1426    sodium chloride (NS) flush 5-10 mL  5-10 mL IntraVENous PRN 10 mL at 07/30/17 2020    naloxone (NARCAN) injection 0.4 mg  0.4 mg IntraVENous PRN      calcium-vitamin D (OS-REGINE) 500 mg-200 unit tablet  1 Tab Oral TID WITH MEALS 1 Tab at 08/01/17 1240    senna-docusate (PERICOLACE) 8.6-50 mg per tablet 1 Tab  1 Tab Oral BID 1 Tab at 08/01/17 0900    polyethylene glycol (MIRALAX) packet 17 g  17 g Oral DAILY 17 g at 08/01/17 0856    bisacodyl (DULCOLAX) suppository 10 mg  10 mg Rectal DAILY PRN      oxyCODONE IR (ROXICODONE) tablet 2.5 mg  2.5 mg Oral Q4H PRN 2.5 mg at 07/31/17 0630    oxyCODONE IR (ROXICODONE) tablet 5 mg  5 mg Oral Q4H PRN 5 mg at 08/01/17 0333    ceFAZolin in 0.9% NS (ANCEF) IVPB soln 2 g  2 g IntraVENous Q8H 2 g at 08/01/17 1240    enoxaparin (LOVENOX) injection 30 mg  30 mg SubCUTAneous DAILY 30 mg at 08/01/17 0856    amLODIPine (NORVASC) tablet 10 mg  10 mg Oral DAILY 10 mg at 07/31/17 1723    brimonidine (ALPHAGAN P) 0.1 % ophthalmic solution 1 Drop  1 Drop Both Eyes BID 1 Drop at 08/01/17 0857    dorzolamide-timolol (COSOPT) 22.3-6.8 mg/mL ophthalmic solution 1 Drop  1 Drop Both Eyes BID 1 Drop at 08/01/17 0857    labetalol (Golden Karina) tablet 100 mg  100 mg Oral  mg at 08/01/17 0856    latanoprost (XALATAN) 0.005 % ophthalmic solution 1 Drop  1 Drop Both Eyes QHS 1 Drop at 07/31/17 2210    levothyroxine (SYNTHROID) tablet 50 mcg  50 mcg Oral ACB 50 mcg at 08/01/17 0549    glucose chewable tablet 16 g  4 Tab Oral PRN      dextrose (D50W) injection syrg 12.5-25 g  12.5-25 g IntraVENous PRN      glucagon (GLUCAGEN) injection 1 mg  1 mg IntraMUSCular PRN      sodium chloride (NS) flush 5-10 mL  5-10 mL IntraVENous Q8H 10 mL at 07/30/17 1317    sodium chloride (NS) flush 5-10 mL  5-10 mL IntraVENous PRN           Case discussed with: GAIL Bowen MD    Signed By: August 1, 2017 August 1, 2017

## 2017-08-01 NOTE — MED STUDENT NOTES
*ATTENTION:  This note has been created by a medical student for educational purposes only. Please do not refer to the content of this note for clinical decision-making, billing, or other purposes. Please see attending physicians note to obtain clinical information on this patient. *     Pt info: Ms. Den Alexis is a 81 yo female with hx of HTN, 3rd degree heart block s/p pacer, DM, and hypothyroidism admitted for R hip fracture. Subjective:  No acute events overnight. Was seen by ortho, palliative medicine, and ID yesterday. Had some R calf pain and swelling yesterday. Objective:  Patient Vitals for the past 12 hrs:   Temp Pulse Resp BP SpO2   08/01/17 0840 99 °F (37.2 °C) 67 16 145/72 97 %   08/01/17 0559 - - - 160/73 -   08/01/17 0301 98.1 °F (36.7 °C) 67 16 164/78 95 %   07/31/17 2343 - 61 - - -   07/31/17 2301 98 °F (36.7 °C) 65 16 121/62 95 %     Physical Exam:  Deferred at this time. Labs:      CBC WITH AUTOMATED DIFF    Collection Time: 08/01/17  6:21 AM   Result Value Ref Range    WBC 10.1 3.6 - 11.0 K/uL    RBC 3.56 (L) 3.80 - 5.20 M/uL    HGB 10.5 (L) 11.5 - 16.0 g/dL    HCT 32.4 (L) 35.0 - 47.0 %    MCV 91.0 80.0 - 99.0 FL    MCH 29.5 26.0 - 34.0 PG    MCHC 32.4 30.0 - 36.5 g/dL    RDW 14.8 (H) 11.5 - 14.5 %    PLATELET 852 230 - 852 K/uL    NEUTROPHILS 68 32 - 75 %    LYMPHOCYTES 22 12 - 49 %    MONOCYTES 7 5 - 13 %    EOSINOPHILS 3 0 - 7 %    BASOPHILS 0 0 - 1 %    ABS. NEUTROPHILS 6.9 1.8 - 8.0 K/UL    ABS. LYMPHOCYTES 2.2 0.8 - 3.5 K/UL    ABS. MONOCYTES 0.7 0.0 - 1.0 K/UL    ABS. EOSINOPHILS 0.3 0.0 - 0.4 K/UL    ABS.  BASOPHILS 0.0 0.0 - 0.1 K/UL    RBC COMMENTS NORMOCYTIC, NORMOCHROMIC           METABOLIC PANEL, BASIC    Collection Time: 08/01/17  6:21 AM   Result Value Ref Range    Sodium 137 136 - 145 mmol/L    Potassium 3.9 3.5 - 5.1 mmol/L    Chloride 103 97 - 108 mmol/L    CO2 28 21 - 32 mmol/L    Anion gap 6 5 - 15 mmol/L    Glucose 116 (H) 65 - 100 mg/dL    BUN 15 6 - 20 MG/DL Creatinine 0.52 (L) 0.55 - 1.02 MG/DL    BUN/Creatinine ratio 29 (H) 12 - 20      GFR est AA >60 >60 ml/min/1.73m2    GFR est non-AA >60 >60 ml/min/1.73m2    Calcium 9.1 8.5 - 10.1 MG/DL     Microbiology:   7/29 Wound culture: NGTD  7/29 Blood culture: NGTD  7/30 Urine culture: NGTD    Imaging:  Bilateral LE Duplex Scan 7/31: negative for DVTs or thrombophlebitis    Current Facility-Administered Medications   Medication Dose Route Frequency    vancomycin (VANCOCIN) 750 mg in 0.9% sodium chloride 250 mL IVPB  750 mg IntraVENous Q24H    acetaminophen (TYLENOL) tablet 1,000 mg  1,000 mg Oral Q8H    sodium chloride (NS) flush 5-10 mL  5-10 mL IntraVENous Q8H    sodium chloride (NS) flush 5-10 mL  5-10 mL IntraVENous PRN    naloxone (NARCAN) injection 0.4 mg  0.4 mg IntraVENous PRN    calcium-vitamin D (OS-REGINE) 500 mg-200 unit tablet  1 Tab Oral TID WITH MEALS    senna-docusate (PERICOLACE) 8.6-50 mg per tablet 1 Tab  1 Tab Oral BID    polyethylene glycol (MIRALAX) packet 17 g  17 g Oral DAILY    bisacodyl (DULCOLAX) suppository 10 mg  10 mg Rectal DAILY PRN    oxyCODONE IR (ROXICODONE) tablet 2.5 mg  2.5 mg Oral Q4H PRN    oxyCODONE IR (ROXICODONE) tablet 5 mg  5 mg Oral Q4H PRN    ceFAZolin in 0.9% NS (ANCEF) IVPB soln 2 g  2 g IntraVENous Q8H    enoxaparin (LOVENOX) injection 30 mg  30 mg SubCUTAneous DAILY    amLODIPine (NORVASC) tablet 10 mg  10 mg Oral DAILY    brimonidine (ALPHAGAN P) 0.1 % ophthalmic solution 1 Drop  1 Drop Both Eyes BID    dorzolamide-timolol (COSOPT) 22.3-6.8 mg/mL ophthalmic solution 1 Drop  1 Drop Both Eyes BID    labetalol (NORMODYNE) tablet 100 mg  100 mg Oral BID    latanoprost (XALATAN) 0.005 % ophthalmic solution 1 Drop  1 Drop Both Eyes QHS    levothyroxine (SYNTHROID) tablet 50 mcg  50 mcg Oral ACB    glucose chewable tablet 16 g  4 Tab Oral PRN    dextrose (D50W) injection syrg 12.5-25 g  12.5-25 g IntraVENous PRN    glucagon (GLUCAGEN) injection 1 mg  1 mg IntraMUSCular PRN    sodium chloride (NS) flush 5-10 mL  5-10 mL IntraVENous Q8H    sodium chloride (NS) flush 5-10 mL  5-10 mL IntraVENous PRN     Assessment/Plan:  Ms. Gaviota Kovacs is a 79 yo female with hx of HTN, 3rd degree heart block, hypothyroidism, and T2DM who was admitted for a R hip fracture after fall. 1. Hip fracture: POD3 s/p R femoral head resection, fracture 2/2 osteoporosis or previous infection causing necrosis of femoral head. Ortho did not recommend hemiarthroplasty due to concern for infection/purulent discharge during exploration. Pt with hx of previous hairline fracture 3 years ago. -Continue IV vancomycin and ancef  -Continue Ca-Vit D daily with meals  -Continue tylenol 1g and oxy 2.5 and 5 mg q4h prn for pain  -Continue miralax/senna-docusate for constipation prn  -Appreciate ID, ortho, and palliative recs    2. 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 Hypothyroidism, stable  -Continue home synthroid 50 mcg daily    4. HTN, stable  -Continue home labetalol 100 mg BID and norvasc 10 mg  -IV hydralazine 10 mg prn for SBP >160 or DBP >100    5. 3rd degree heart block, stable, s/p pacer    6. T2DM, stable with last A1C 6.5% July 2017  -Continue healthy diet, not managed on medications    F: none  E: replete prn  N: mechanical soft diet  DVT ppx: lovenox 30 mg sq daily  Code: DNR  Dispo: working with case management, plan to d/c to [facility]    Marilynn Felix, [address] [place] [address]    I saw and evaluated the patient, performing the key elements of the service. I discussed the findings, assessment and plan with the resident and agree with the resident's findings and plan as documented in the resident's note.

## 2017-08-01 NOTE — PROGRESS NOTES
Bedside and Verbal shift change report given to Luis M Kulkarni (oncoming nurse) by Fredy Montiel RN (offgoing nurse). Report included the following information SBAR, Kardex, Procedure Summary, Intake/Output, MAR, Accordion and Recent Results.

## 2017-08-01 NOTE — PROGRESS NOTES
[facility] FAMILY MEDICINE RESIDENCY PROGRAM   Daily Progress Note    Date: 8/1/2017  PCP: Yessica Kamara MD     Assessment/Plan: Kelly Gandhi is a 80 y.o. [place] female with history of HTN and 3rd degree AV block s/p pacer placement who has spent 4 night(s) in the hospital, who is admitted for hip fracture. Hip Fracture with possible osteomyelitis s/p femur head resection/debridment: possibly due to fall (3yrs ago from son) and/or necrosis of femoral head with underlying osteoporosis. Purulent material found in OR pending culture growth- unclear source - possible liquifed bone   - spoke with ortho PA - plan is to return to OR for Girdlestone procedure to insert metallic spacer implant after 5 days of NG on purulent cx     - on Ancef 2g q8h and Vanc 750mg IV day 4 (start on 7/29)  - BCx and wound Cx from 7/29 NG for 3days   - ortho and ID following  - Pain meds: tylenol 1000mg, oxycodone 2.5 and 5mg prn     HTN - stable  - continue home med labetalol 100mg and norvasc 10mg, added hydralazine prn with parameters  - continue to monitor and adjust as necessary    Asymptomatic UTI/colonization - resolved     3rd degree AV heart block - stable (s/p pacer placement) - on tele     Glaucoma - per son only able to see shadows. Hx of failed cornea graft  - continue home Dorzolamide-timolol, brimonidine, latanoprost    Hypothyroidism - Stable - TSH 1.14 (6/13/2016)   - Continue home synthroid 50mcg dialy    GERD - stable, not on home med    DM type II - A1c 6.5, stable, not on any medication. FEN/GI - mechanical soft diet w/ supplements (ad zaire). Activity - bedrest  DVT prophylaxis - lovenox  GI prophylaxis -  not indicated  Disposition - Plan to d/c to rehab (awaiting first choice - [place], accepted by [facility]) and afterwards can do long-term nursing home.      CODE STATUS:  DNR    Yumi Tinajero MD  Family Medicine Resident         CC: pain    Subjective  Pt had increased pain overnight and was given oxycodone at night. Patient speaks limited [address] Per son, manageable pain (only when moving). Denies fever, chills, chest pain, palpitations, shortness of breath, nausea and vomiting, and LE edema. Tolerating diet well. Having nml BM.     Inpatient Medications  Current Facility-Administered Medications   Medication Dose Route Frequency    vancomycin (VANCOCIN) 750 mg in 0.9% sodium chloride 250 mL IVPB  750 mg IntraVENous Q24H    acetaminophen (TYLENOL) tablet 1,000 mg  1,000 mg Oral Q8H    sodium chloride (NS) flush 5-10 mL  5-10 mL IntraVENous Q8H    sodium chloride (NS) flush 5-10 mL  5-10 mL IntraVENous PRN    naloxone (NARCAN) injection 0.4 mg  0.4 mg IntraVENous PRN    calcium-vitamin D (OS-REGINE) 500 mg-200 unit tablet  1 Tab Oral TID WITH MEALS    senna-docusate (PERICOLACE) 8.6-50 mg per tablet 1 Tab  1 Tab Oral BID    polyethylene glycol (MIRALAX) packet 17 g  17 g Oral DAILY    bisacodyl (DULCOLAX) suppository 10 mg  10 mg Rectal DAILY PRN    oxyCODONE IR (ROXICODONE) tablet 2.5 mg  2.5 mg Oral Q4H PRN    oxyCODONE IR (ROXICODONE) tablet 5 mg  5 mg Oral Q4H PRN    ceFAZolin in 0.9% NS (ANCEF) IVPB soln 2 g  2 g IntraVENous Q8H    enoxaparin (LOVENOX) injection 30 mg  30 mg SubCUTAneous DAILY    amLODIPine (NORVASC) tablet 10 mg  10 mg Oral DAILY    brimonidine (ALPHAGAN P) 0.1 % ophthalmic solution 1 Drop  1 Drop Both Eyes BID    dorzolamide-timolol (COSOPT) 22.3-6.8 mg/mL ophthalmic solution 1 Drop  1 Drop Both Eyes BID    labetalol (NORMODYNE) tablet 100 mg  100 mg Oral BID    latanoprost (XALATAN) 0.005 % ophthalmic solution 1 Drop  1 Drop Both Eyes QHS    levothyroxine (SYNTHROID) tablet 50 mcg  50 mcg Oral ACB    glucose chewable tablet 16 g  4 Tab Oral PRN    dextrose (D50W) injection syrg 12.5-25 g  12.5-25 g IntraVENous PRN    glucagon (GLUCAGEN) injection 1 mg  1 mg IntraMUSCular PRN    sodium chloride (NS) flush 5-10 mL  5-10 mL IntraVENous Q8H    sodium chloride (NS) flush 5-10 mL  5-10 mL IntraVENous PRN         Allergies  Allergies   Allergen Reactions    Ace Inhibitors Cough    Cozaar [Losartan] Angioedema    Hydrochlorothiazide Other (comments) and Vertigo     Weak in legs, HYPONATREMIA (109 - 2014 admission)    Niaspan [Niacin] Nausea Only    Pravastatin Other (comments)     dizzy    Prilosec [Omeprazole] Myalgia    Sular [Nisoldipine] Vertigo         Objective  Vitals:  Visit Vitals    /78 (BP 1 Location: Right arm, BP Patient Position: At rest)    Pulse 67    Temp 98.1 °F (36.7 °C)    Resp 16    Ht 4' 10\" (1.473 m)    Wt 99 lb 10.4 oz (45.2 kg)    LMP 1963    SpO2 95%    Breastfeeding No    BMI 20.83 kg/m2      Temp (24hrs), Av.2 °F (36.8 °C), Min:98 °F (36.7 °C), Max:98.5 °F (36.9 °C)     O2 Flow Rate (L/min): 3 l/min   O2 Device: Room air    I/O:    Intake/Output Summary (Last 24 hours) at 17 0516  Last data filed at 17 0330   Gross per 24 hour   Intake                0 ml   Output             1200 ml   Net            -1200 ml     Last 3 shifts:     07 -  1900  In: -   Out: 8198 [Urine:4050]    Physical Exam  General:   Alert, cooperative, no acute distress   Head:   Atraumatic   ENT:  Oral mucosa normal   Neck:  Supple, trachea midline, no adenopathy   No JVD   Lungs:   Clear to auscultation bilaterally    Heart:   Regular rhythm, no murmur   Abdomen:    Soft, non-tender. No masses or organomegaly    Extremities:  TTP on right hip, no petechiae. No edema or DVT signs   Skin:  Warm and dry. No rashes or lesions   Neurologic:  Oriented.   No focal deficits       Urinary catheter:  external catheter (purewick)         Labs and Data:    Telemetry: SR (few pvc's)    Recent Labs      17   0301  17   0406   WBC  9.4  8.5   HGB  9.9*  10.4*   HCT  29.6*  30.6*   PLT  307  295     Recent Labs      17   0301  17   0406   NA  138  135*   K  3.2*  3.9   CL  103  102   CO2  28  25   GLU  128*  144* BUN  11  10   CREA  0.61  0.54*   CA  8.9  8.4*   MG  1.7   --    PHOS  3.4   --        Imaging/procedures:   LE Duplex 8/1/17:   INTERPRETATION/FINDINGS  PROCEDURE:  RIGHT LOWER EXTREMITY  VENOUS DUPLEX. Evaluation of lower extremity veins with ultrasound (B-mode imaging, pulsed Doppler, color  Doppler). Includes the common femoral, deep femoral, femoral, popliteal, posterior tibial, peroneal, and great saphenous veins. Other veins, for example the gastrocnemius and soleal veins, may also be visualized.     FINDINGS: Gray scale and color flow duplex images of the veins in the right lower extremity demonstrate normal compressibility, spontaneous and augmented flow profiles, and absence of filling defects throughout the deep and superficial veins in the right lower extremity.     CONCLUSION: Right lower extremity venous duplex negative for deep venous thrombosis or thrombophlebitis. Left common femoral vein is thrombus free. Signed by: Lauren Ontiveros MD  Resident, Family Medicine  08/01/17       Attending Note:  [address] [facility] Medicine Residency Attending Addendum:  I saw and evaluated the patient, performing the key elements of the service. I discussed the findings, assessment and plan with the resident and agree with the resident's findings and plan as documented in the resident's note. The patient was seen on 8/1/17  No acute events overnight.   Awaiting full culture results  Family is very helpful    Vitals:    08/03/17 2300 08/03/17 2312 08/04/17 0407 08/04/17 0844   BP:  109/60 114/65 114/65   Pulse: 66 70 67 67   Resp:  16 16    Temp:  98.2 °F (36.8 °C) 98.4 °F (36.9 °C)    SpO2:  98% 97%    Weight:       Height:       LMP: 03/05/1963     NAD        Assessment/Plan:   Right hip fracture-continue antibiotics  awaitng ortho decison re next steps  HTN stable      Hospital Problems  Date Reviewed: 6/14/2017          Codes Class Noted POA    Hyponatremia ICD-10-CM: E87.1  ICD-9-CM: 276.1  8/3/2017 No * (Principal)Hip fracture, right ([facility] [address] ICD-10-CM: S72.001A  ICD-9-CM: 820.8  7/28/2017 Yes        HTN (hypertension) ICD-10-CM: I10  ICD-9-CM: 401.9  2/15/2011 Yes    Overview Signed 5/28/2013  9:13 AM by Nikos Becker MD     Goal < 150 SBP             GERD (gastroesophageal reflux disease) ICD-10-CM: K21.9  ICD-9-CM: 530.81  2/15/2011 Yes    Overview Signed 4/3/2012  1:25 PM by Nikos Becker MD      2008             Hypothyroid ICD-10-CM: E03.9  ICD-9-CM: 244.9  2/15/2011 Yes

## 2017-08-01 NOTE — PROGRESS NOTES
Orthopedic History and Physical     Patient: Osei Bradshaw MRN: [id number]  SSN: [id number]    YOB: 1923  Age: 80 y.o. Sex: female          Subjective:     Patient is a 80 y.o.  female resting in bed. Complains that she is uncomfortable laying flat. Son at bedside. Minimal complaints of pain. 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 pylori infection 05/28/2013    Angioedema 04/30/2013    Glaucoma 04/03/2012    Hot flashes 09/26/2011    Murmur, cardiac 08/30/2011    Other and unspecified hyperlipidemia 02/15/2011    HTN (hypertension) 02/15/2011    GERD (gastroesophageal reflux disease) 02/15/2011    Hypothyroid 02/15/2011    Syncope and collapse 02/15/2011    Atrioventricular block, complete ([address] 02/15/2011     Past Medical History:   Diagnosis Date    Atrioventricular block, complete ([address] 2/15/2011    GERD (gastroesophageal reflux disease) 2/15/2011    HTN (hypertension) 2/15/2011    Hypercholesteremia     Hypothyroid 2/15/2011    Murmur     Osteoporosis     Other and unspecified hyperlipidemia 2/15/2011    Pacemaker     Syncope and collapse 2/15/2011    Thyroid disease       Past Surgical History:   Procedure Laterality Date    HX GYN      hyst    HX HEENT      corneal tx    HX PACEMAKER      INS PPM/ICD LED DUAL ONLY  9/4/2014           Prior to Admission medications    Medication Sig Start Date End Date Taking? Authorizing Provider   therapeutic multivitamin [facility]) tablet Take 1 Tab by mouth daily.    Yes Historical Provider   levothyroxine (SYNTHROID) 50 mcg tablet TAKE ONE TABLET BY MOUTH DAILY BEFORE BREAKFAST 5/7/17  Yes Hossein Martinez MD   amLODIPine (NORVASC) 10 mg tablet TAKE ONE TABLET BY MOUTH DAILY 3/27/17  Yes Hossein Martinez MD   labetalol (NORMODYNE) 100 mg tablet TAKE ONE TABLET BY MOUTH EVERY 12 HOURS 9/18/16  Yes Hossein Martinez MD   furosemide (LASIX) 20 mg tablet Take 1 tablet by mouth daily. 9/8/14  Yes Yudelka Thomason MD   latanoprost (XALATAN) 0.005 % ophthalmic solution Administer 1 drop to both eyes nightly. Yes Historical Provider   dorzolamide-timolol (COSOPT) 2-0.5 % ophthalmic solution Administer 1 drop to both eyes two (2) times a day. Yes Historical Provider   brimonidine (ALPHAGAN P) 0.1 % ophthalmic solution Administer 1 drop to both eyes two (2) times a day. Yes Historical Provider   omega-3 fatty acids-vitamin e (FISH OIL) 1,000 mg Cap Take 1 capsule by mouth daily. Yes Historical Provider   bisacodyl (DULCOLAX, BISACODYL,) 5 mg EC tablet Take 5 mg by mouth as needed for Constipation.     Historical Provider     Current Facility-Administered Medications   Medication Dose Route Frequency    hydrALAZINE (APRESOLINE) 20 mg/mL injection 10 mg  10 mg IntraVENous Q6H PRN    Vancomycin Trough 17:30  1 Each Other ONCE    vancomycin (VANCOCIN) 750 mg in 0.9% sodium chloride 250 mL IVPB  750 mg IntraVENous Q24H    acetaminophen (TYLENOL) tablet 1,000 mg  1,000 mg Oral Q8H    sodium chloride (NS) flush 5-10 mL  5-10 mL IntraVENous Q8H    sodium chloride (NS) flush 5-10 mL  5-10 mL IntraVENous PRN    naloxone (NARCAN) injection 0.4 mg  0.4 mg IntraVENous PRN    calcium-vitamin D (OS-REGINE) 500 mg-200 unit tablet  1 Tab Oral TID WITH MEALS    senna-docusate (PERICOLACE) 8.6-50 mg per tablet 1 Tab  1 Tab Oral BID    polyethylene glycol (MIRALAX) packet 17 g  17 g Oral DAILY    bisacodyl (DULCOLAX) suppository 10 mg  10 mg Rectal DAILY PRN    oxyCODONE IR (ROXICODONE) tablet 2.5 mg  2.5 mg Oral Q4H PRN    oxyCODONE IR (ROXICODONE) tablet 5 mg  5 mg Oral Q4H PRN    ceFAZolin in 0.9% NS (ANCEF) IVPB soln 2 g  2 g IntraVENous Q8H    enoxaparin (LOVENOX) injection 30 mg  30 mg SubCUTAneous DAILY    amLODIPine (NORVASC) tablet 10 mg  10 mg Oral DAILY    brimonidine (ALPHAGAN P) 0.1 % ophthalmic solution 1 Drop  1 Drop Both Eyes BID    dorzolamide-timolol (COSOPT) 22.3-6.8 mg/mL ophthalmic solution 1 Drop  1 Drop Both Eyes BID    labetalol (NORMODYNE) tablet 100 mg  100 mg Oral BID    latanoprost (XALATAN) 0.005 % ophthalmic solution 1 Drop  1 Drop Both Eyes QHS    levothyroxine (SYNTHROID) tablet 50 mcg  50 mcg Oral ACB    glucose chewable tablet 16 g  4 Tab Oral PRN    dextrose (D50W) injection syrg 12.5-25 g  12.5-25 g IntraVENous PRN    glucagon (GLUCAGEN) injection 1 mg  1 mg IntraMUSCular PRN    sodium chloride (NS) flush 5-10 mL  5-10 mL IntraVENous Q8H    sodium chloride (NS) flush 5-10 mL  5-10 mL IntraVENous PRN      Allergies   Allergen Reactions    Ace Inhibitors Cough    Cozaar [Losartan] Angioedema    Hydrochlorothiazide Other (comments) and Vertigo     Weak in legs, HYPONATREMIA (109 - 2014 admission)    Niaspan [Niacin] Nausea Only    Pravastatin Other (comments)     dizzy    Prilosec [Omeprazole] Myalgia    Sular [Nisoldipine] Vertigo      Social History   Substance Use Topics    Smoking status: Never Smoker    Smokeless tobacco: Never Used    Alcohol use No      Family History   Problem Relation Age of Onset    Alcohol abuse Brother         Review of Systems  A comprehensive review of systems was negative except for that written in the HPI. Objective:     No data found. Temp (24hrs), Av.2 °F (36.8 °C), Min:97.4 °F (36.3 °C), Max:99 °F (37.2 °C)      EXAM:   Physical Exam:   Patient appears generally well, mood and affect are appropriate and pleasant. Extremities: Right hip dressing without drainage. No edema. PP +2.  Right calf sore with foot extension. Vascular: 2+ dorsalis pedis pulses bilaterally. Imaging Review    Imaging Review: Dopplers negative for DVT RLE     Labs:   Recent Results (from the past 12 hour(s))   CBC WITH AUTOMATED DIFF    Collection Time: 08/01/17  6:21 AM   Result Value Ref Range    WBC 10.1 3.6 - 11.0 K/uL    RBC 3.56 (L) 3.80 - 5.20 M/uL    HGB 10.5 (L) 11.5 - 16.0 g/dL    HCT 32.4 (L) 35.0 - 47.0 %    MCV 91.0 80.0 - 99.0 FL    MCH 29.5 26.0 - 34.0 PG    MCHC 32.4 30.0 - 36.5 g/dL    RDW 14.8 (H) 11.5 - 14.5 %    PLATELET 992 991 - 204 K/uL    NEUTROPHILS 68 32 - 75 %    LYMPHOCYTES 22 12 - 49 %    MONOCYTES 7 5 - 13 %    EOSINOPHILS 3 0 - 7 %    BASOPHILS 0 0 - 1 %    ABS. NEUTROPHILS 6.9 1.8 - 8.0 K/UL    ABS. LYMPHOCYTES 2.2 0.8 - 3.5 K/UL    ABS. MONOCYTES 0.7 0.0 - 1.0 K/UL    ABS. EOSINOPHILS 0.3 0.0 - 0.4 K/UL    ABS. BASOPHILS 0.0 0.0 - 0.1 K/UL    RBC COMMENTS NORMOCYTIC, NORMOCHROMIC     METABOLIC PANEL, BASIC    Collection Time: 08/01/17  6:21 AM   Result Value Ref Range    Sodium 137 136 - 145 mmol/L    Potassium 3.9 3.5 - 5.1 mmol/L    Chloride 103 97 - 108 mmol/L    CO2 28 21 - 32 mmol/L    Anion gap 6 5 - 15 mmol/L    Glucose 116 (H) 65 - 100 mg/dL    BUN 15 6 - 20 MG/DL    Creatinine 0.52 (L) 0.55 - 1.02 MG/DL    BUN/Creatinine ratio 29 (H) 12 - 20      GFR est AA >60 >60 ml/min/1.73m2    GFR est non-AA >60 >60 ml/min/1.73m2    Calcium 9.1 8.5 - 10.1 MG/DL   MAGNESIUM    Collection Time: 08/01/17  6:21 AM   Result Value Ref Range    Magnesium 1.6 1.6 - 2.4 mg/dL   PHOSPHORUS    Collection Time: 08/01/17  6:21 AM   Result Value Ref Range    Phosphorus 3.8 2.6 - 4.7 MG/DL   NUCLEATED RBC    Collection Time: 08/01/17  6:21 AM   Result Value Ref Range    NRBC 0.0 0  WBC    ABSOLUTE NRBC 0.00 0.00 - 0.01 K/uL     Cultures: NEGATIVE thus far  Pathology: not resulted as of yet.  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 pylori infection 05/28/2013    Angioedema 04/30/2013    Glaucoma 04/03/2012    Hot flashes 09/26/2011    Murmur, cardiac 08/30/2011    Other and unspecified hyperlipidemia 02/15/2011    HTN (hypertension) 02/15/2011    GERD (gastroesophageal reflux disease) 02/15/2011    Hypothyroid 02/15/2011    Syncope and collapse 02/15/2011    Atrioventricular block, complete ([facility] [address] 02/15/2011         Plan:   Right hip girdlestone due to possible infection of femoral head. Awaiting culture results and pathology report: if all NEGATIVE tomorrow; plan for right hemiarthroplasty Wednesday evening, per Dr. Kelly Mario. Consult mobility services for turning  Replaced prevalon boots to bilateral feet to avoid heel ulceration. NPO after breakfast tomorrow  Discussed plan with son; he agrees.      Louise Sethi NP  Orthopaedic Nurse Practitioner    [address] (P:  [phone])

## 2017-08-02 ENCOUNTER — APPOINTMENT (OUTPATIENT)
Dept: GENERAL RADIOLOGY | Age: 82
DRG: 470 | End: 2017-08-02
Attending: ORTHOPAEDIC SURGERY
Payer: MEDICARE

## 2017-08-02 ENCOUNTER — ANESTHESIA (OUTPATIENT)
Dept: SURGERY | Age: 82
DRG: 470 | End: 2017-08-02
Payer: MEDICARE

## 2017-08-02 LAB
ABO + RH BLD: NORMAL
ANION GAP BLD CALC-SCNC: 4 MMOL/L (ref 5–15)
BASOPHILS # BLD AUTO: 0 K/UL (ref 0–0.1)
BASOPHILS # BLD: 0 % (ref 0–1)
BLOOD GROUP ANTIBODIES SERPL: NORMAL
BUN SERPL-MCNC: 13 MG/DL (ref 6–20)
BUN/CREAT SERPL: 23 (ref 12–20)
CALCIUM SERPL-MCNC: 8.9 MG/DL (ref 8.5–10.1)
CHLORIDE SERPL-SCNC: 101 MMOL/L (ref 97–108)
CO2 SERPL-SCNC: 30 MMOL/L (ref 21–32)
CREAT SERPL-MCNC: 0.57 MG/DL (ref 0.55–1.02)
EOSINOPHIL # BLD: 0.3 K/UL (ref 0–0.4)
EOSINOPHIL NFR BLD: 4 % (ref 0–7)
ERYTHROCYTE [DISTWIDTH] IN BLOOD BY AUTOMATED COUNT: 14.5 % (ref 11.5–14.5)
GLUCOSE SERPL-MCNC: 168 MG/DL (ref 65–100)
HCT VFR BLD AUTO: 32.8 % (ref 35–47)
HGB BLD-MCNC: 11.3 G/DL (ref 11.5–16)
LYMPHOCYTES # BLD AUTO: 19 % (ref 12–49)
LYMPHOCYTES # BLD: 1.6 K/UL (ref 0.8–3.5)
MAGNESIUM SERPL-MCNC: 1.5 MG/DL (ref 1.6–2.4)
MCH RBC QN AUTO: 30.2 PG (ref 26–34)
MCHC RBC AUTO-ENTMCNC: 34.5 G/DL (ref 30–36.5)
MCV RBC AUTO: 87.7 FL (ref 80–99)
MONOCYTES # BLD: 0.4 K/UL (ref 0–1)
MONOCYTES NFR BLD AUTO: 5 % (ref 5–13)
NEUTS SEG # BLD: 5.8 K/UL (ref 1.8–8)
NEUTS SEG NFR BLD AUTO: 72 % (ref 32–75)
PHOSPHATE SERPL-MCNC: 3.9 MG/DL (ref 2.6–4.7)
PLATELET # BLD AUTO: 362 K/UL (ref 150–400)
POTASSIUM SERPL-SCNC: 3.5 MMOL/L (ref 3.5–5.1)
RBC # BLD AUTO: 3.74 M/UL (ref 3.8–5.2)
SODIUM SERPL-SCNC: 135 MMOL/L (ref 136–145)
SPECIMEN EXP DATE BLD: NORMAL
WBC # BLD AUTO: 8.1 K/UL (ref 3.6–11)

## 2017-08-02 PROCEDURE — 74011000258 HC RX REV CODE- 258: Performed by: FAMILY MEDICINE

## 2017-08-02 PROCEDURE — 36415 COLL VENOUS BLD VENIPUNCTURE: CPT | Performed by: ANESTHESIOLOGY

## 2017-08-02 PROCEDURE — 74011000250 HC RX REV CODE- 250: Performed by: ORTHOPAEDIC SURGERY

## 2017-08-02 PROCEDURE — 76010000131 HC OR TIME 2 TO 2.5 HR: Performed by: ORTHOPAEDIC SURGERY

## 2017-08-02 PROCEDURE — 77030033067 HC SUT PDO STRATFX SPIR J&J -B: Performed by: ORTHOPAEDIC SURGERY

## 2017-08-02 PROCEDURE — 74011250636 HC RX REV CODE- 250/636: Performed by: INTERNAL MEDICINE

## 2017-08-02 PROCEDURE — 80048 BASIC METABOLIC PNL TOTAL CA: CPT | Performed by: ORTHOPAEDIC SURGERY

## 2017-08-02 PROCEDURE — 77030020788: Performed by: ORTHOPAEDIC SURGERY

## 2017-08-02 PROCEDURE — 74011250637 HC RX REV CODE- 250/637: Performed by: STUDENT IN AN ORGANIZED HEALTH CARE EDUCATION/TRAINING PROGRAM

## 2017-08-02 PROCEDURE — 77030008684 HC TU ET CUF COVD -B: Performed by: ANESTHESIOLOGY

## 2017-08-02 PROCEDURE — 86900 BLOOD TYPING SEROLOGIC ABO: CPT | Performed by: ANESTHESIOLOGY

## 2017-08-02 PROCEDURE — 77030020782 HC GWN BAIR PAWS FLX 3M -B

## 2017-08-02 PROCEDURE — 74011000272 HC RX REV CODE- 272: Performed by: ORTHOPAEDIC SURGERY

## 2017-08-02 PROCEDURE — C1776 JOINT DEVICE (IMPLANTABLE): HCPCS | Performed by: ORTHOPAEDIC SURGERY

## 2017-08-02 PROCEDURE — 74011250636 HC RX REV CODE- 250/636: Performed by: ANESTHESIOLOGY

## 2017-08-02 PROCEDURE — 74011250636 HC RX REV CODE- 250/636

## 2017-08-02 PROCEDURE — 77030011640 HC PAD GRND REM COVD -A: Performed by: ORTHOPAEDIC SURGERY

## 2017-08-02 PROCEDURE — 0SRR01Z REPLACEMENT OF RIGHT HIP JOINT, FEMORAL SURFACE WITH METAL SYNTHETIC SUBSTITUTE, OPEN APPROACH: ICD-10-PCS | Performed by: ORTHOPAEDIC SURGERY

## 2017-08-02 PROCEDURE — 74011250636 HC RX REV CODE- 250/636: Performed by: STUDENT IN AN ORGANIZED HEALTH CARE EDUCATION/TRAINING PROGRAM

## 2017-08-02 PROCEDURE — 74011000250 HC RX REV CODE- 250

## 2017-08-02 PROCEDURE — 74011250637 HC RX REV CODE- 250/637: Performed by: ORTHOPAEDIC SURGERY

## 2017-08-02 PROCEDURE — 77030010507 HC ADH SKN DERMBND J&J -B: Performed by: ORTHOPAEDIC SURGERY

## 2017-08-02 PROCEDURE — 74011250636 HC RX REV CODE- 250/636: Performed by: ORTHOPAEDIC SURGERY

## 2017-08-02 PROCEDURE — 65660000000 HC RM CCU STEPDOWN

## 2017-08-02 PROCEDURE — 83735 ASSAY OF MAGNESIUM: CPT | Performed by: ORTHOPAEDIC SURGERY

## 2017-08-02 PROCEDURE — 93005 ELECTROCARDIOGRAM TRACING: CPT

## 2017-08-02 PROCEDURE — 85025 COMPLETE CBC W/AUTO DIFF WBC: CPT | Performed by: ORTHOPAEDIC SURGERY

## 2017-08-02 PROCEDURE — 76210000016 HC OR PH I REC 1 TO 1.5 HR: Performed by: ORTHOPAEDIC SURGERY

## 2017-08-02 PROCEDURE — 77030013079 HC BLNKT BAIR HGGR 3M -A: Performed by: ANESTHESIOLOGY

## 2017-08-02 PROCEDURE — 84100 ASSAY OF PHOSPHORUS: CPT | Performed by: ORTHOPAEDIC SURGERY

## 2017-08-02 PROCEDURE — 77030019908 HC STETH ESOPH SIMS -A: Performed by: ANESTHESIOLOGY

## 2017-08-02 PROCEDURE — 76060000035 HC ANESTHESIA 2 TO 2.5 HR: Performed by: ORTHOPAEDIC SURGERY

## 2017-08-02 PROCEDURE — 76010000171 HC OR TIME 2 TO 2.5 HR INTENSV-TIER 1: Performed by: ORTHOPAEDIC SURGERY

## 2017-08-02 PROCEDURE — 77030002933 HC SUT MCRYL J&J -A: Performed by: ORTHOPAEDIC SURGERY

## 2017-08-02 PROCEDURE — 72170 X-RAY EXAM OF PELVIS: CPT

## 2017-08-02 DEVICE — IMPLANTABLE DEVICE: Type: IMPLANTABLE DEVICE | Site: HIP | Status: FUNCTIONAL

## 2017-08-02 DEVICE — HIP POR FX BIPLR STD HD -- H4: Type: IMPLANTABLE DEVICE | Status: FUNCTIONAL

## 2017-08-02 RX ORDER — PROPOFOL 10 MG/ML
INJECTION, EMULSION INTRAVENOUS AS NEEDED
Status: DISCONTINUED | OUTPATIENT
Start: 2017-08-02 | End: 2017-08-02 | Stop reason: HOSPADM

## 2017-08-02 RX ORDER — DEXAMETHASONE SODIUM PHOSPHATE 4 MG/ML
INJECTION, SOLUTION INTRA-ARTICULAR; INTRALESIONAL; INTRAMUSCULAR; INTRAVENOUS; SOFT TISSUE AS NEEDED
Status: DISCONTINUED | OUTPATIENT
Start: 2017-08-02 | End: 2017-08-02 | Stop reason: HOSPADM

## 2017-08-02 RX ORDER — POTASSIUM CHLORIDE 1.5 G/1.77G
40 POWDER, FOR SOLUTION ORAL
Status: COMPLETED | OUTPATIENT
Start: 2017-08-02 | End: 2017-08-02

## 2017-08-02 RX ORDER — DEXTROSE MONOHYDRATE AND SODIUM CHLORIDE 5; .45 G/100ML; G/100ML
50 INJECTION, SOLUTION INTRAVENOUS CONTINUOUS
Status: DISCONTINUED | OUTPATIENT
Start: 2017-08-02 | End: 2017-08-03

## 2017-08-02 RX ORDER — LIDOCAINE HYDROCHLORIDE 10 MG/ML
0.1 INJECTION, SOLUTION EPIDURAL; INFILTRATION; INTRACAUDAL; PERINEURAL AS NEEDED
Status: DISCONTINUED | OUTPATIENT
Start: 2017-08-02 | End: 2017-08-03

## 2017-08-02 RX ORDER — SODIUM CHLORIDE, SODIUM LACTATE, POTASSIUM CHLORIDE, CALCIUM CHLORIDE 600; 310; 30; 20 MG/100ML; MG/100ML; MG/100ML; MG/100ML
100 INJECTION, SOLUTION INTRAVENOUS CONTINUOUS
Status: DISCONTINUED | OUTPATIENT
Start: 2017-08-02 | End: 2017-08-03

## 2017-08-02 RX ORDER — FENTANYL CITRATE 50 UG/ML
50 INJECTION, SOLUTION INTRAMUSCULAR; INTRAVENOUS
Status: CANCELLED | OUTPATIENT
Start: 2017-08-02

## 2017-08-02 RX ORDER — SODIUM CHLORIDE 0.9 % (FLUSH) 0.9 %
5-10 SYRINGE (ML) INJECTION AS NEEDED
Status: DISCONTINUED | OUTPATIENT
Start: 2017-08-02 | End: 2017-08-03

## 2017-08-02 RX ORDER — ROCURONIUM BROMIDE 10 MG/ML
INJECTION, SOLUTION INTRAVENOUS AS NEEDED
Status: DISCONTINUED | OUTPATIENT
Start: 2017-08-02 | End: 2017-08-02 | Stop reason: HOSPADM

## 2017-08-02 RX ORDER — SODIUM CHLORIDE 0.9 % (FLUSH) 0.9 %
5-10 SYRINGE (ML) INJECTION EVERY 8 HOURS
Status: DISCONTINUED | OUTPATIENT
Start: 2017-08-02 | End: 2017-08-03

## 2017-08-02 RX ORDER — MAGNESIUM SULFATE 1 G/100ML
1 INJECTION INTRAVENOUS ONCE
Status: COMPLETED | OUTPATIENT
Start: 2017-08-02 | End: 2017-08-04

## 2017-08-02 RX ORDER — LIDOCAINE HYDROCHLORIDE 20 MG/ML
INJECTION, SOLUTION EPIDURAL; INFILTRATION; INTRACAUDAL; PERINEURAL AS NEEDED
Status: DISCONTINUED | OUTPATIENT
Start: 2017-08-02 | End: 2017-08-02 | Stop reason: HOSPADM

## 2017-08-02 RX ORDER — SUCCINYLCHOLINE CHLORIDE 20 MG/ML
INJECTION INTRAMUSCULAR; INTRAVENOUS AS NEEDED
Status: DISCONTINUED | OUTPATIENT
Start: 2017-08-02 | End: 2017-08-02 | Stop reason: HOSPADM

## 2017-08-02 RX ORDER — FENTANYL CITRATE 50 UG/ML
INJECTION, SOLUTION INTRAMUSCULAR; INTRAVENOUS AS NEEDED
Status: DISCONTINUED | OUTPATIENT
Start: 2017-08-02 | End: 2017-08-02 | Stop reason: HOSPADM

## 2017-08-02 RX ADMIN — LIDOCAINE HYDROCHLORIDE 60 MG: 20 INJECTION, SOLUTION EPIDURAL; INFILTRATION; INTRACAUDAL; PERINEURAL at 17:20

## 2017-08-02 RX ADMIN — SUCCINYLCHOLINE CHLORIDE 80 MG: 20 INJECTION INTRAMUSCULAR; INTRAVENOUS at 17:20

## 2017-08-02 RX ADMIN — VANCOMYCIN HYDROCHLORIDE 750 MG: 10 INJECTION, POWDER, LYOPHILIZED, FOR SOLUTION INTRAVENOUS at 10:35

## 2017-08-02 RX ADMIN — LEVOTHYROXINE SODIUM 50 MCG: 0.05 TABLET ORAL at 06:56

## 2017-08-02 RX ADMIN — ACETAMINOPHEN 1000 MG: 500 TABLET ORAL at 14:06

## 2017-08-02 RX ADMIN — DORZOLAMIDE HYDROCHLORIDE AND TIMOLOL MALEATE 1 DROP: 20; 5 SOLUTION/ DROPS OPHTHALMIC at 08:21

## 2017-08-02 RX ADMIN — ACETAMINOPHEN 1000 MG: 500 TABLET ORAL at 21:04

## 2017-08-02 RX ADMIN — FENTANYL CITRATE 50 MCG: 50 INJECTION, SOLUTION INTRAMUSCULAR; INTRAVENOUS at 17:13

## 2017-08-02 RX ADMIN — DEXTROSE MONOHYDRATE AND SODIUM CHLORIDE 50 ML/HR: 5; .45 INJECTION, SOLUTION INTRAVENOUS at 10:46

## 2017-08-02 RX ADMIN — BRIMONIDINE TARTRATE 1 DROP: 1 SOLUTION/ DROPS OPHTHALMIC at 21:05

## 2017-08-02 RX ADMIN — POTASSIUM CHLORIDE 40 MEQ: 1.5 POWDER, FOR SOLUTION ORAL at 09:02

## 2017-08-02 RX ADMIN — LABETALOL HCL 100 MG: 100 TABLET, FILM COATED ORAL at 08:19

## 2017-08-02 RX ADMIN — AMLODIPINE BESYLATE 10 MG: 5 TABLET ORAL at 21:04

## 2017-08-02 RX ADMIN — OYSTER SHELL CALCIUM WITH VITAMIN D 1 TABLET: 500; 200 TABLET, FILM COATED ORAL at 08:18

## 2017-08-02 RX ADMIN — MAGNESIUM SULFATE HEPTAHYDRATE 1 G: 1 INJECTION, SOLUTION INTRAVENOUS at 09:04

## 2017-08-02 RX ADMIN — CEFAZOLIN 2 G: 1 INJECTION, POWDER, FOR SOLUTION INTRAMUSCULAR; INTRAVENOUS; PARENTERAL at 06:57

## 2017-08-02 RX ADMIN — LATANOPROST 1 DROP: 50 SOLUTION OPHTHALMIC at 21:05

## 2017-08-02 RX ADMIN — DORZOLAMIDE HYDROCHLORIDE AND TIMOLOL MALEATE 1 DROP: 20; 5 SOLUTION/ DROPS OPHTHALMIC at 21:05

## 2017-08-02 RX ADMIN — BRIMONIDINE TARTRATE 1 DROP: 1 SOLUTION/ DROPS OPHTHALMIC at 08:20

## 2017-08-02 RX ADMIN — ENOXAPARIN SODIUM 30 MG: 30 INJECTION SUBCUTANEOUS at 08:19

## 2017-08-02 RX ADMIN — ROCURONIUM BROMIDE 5 MG: 10 INJECTION, SOLUTION INTRAVENOUS at 17:20

## 2017-08-02 RX ADMIN — LABETALOL HCL 100 MG: 100 TABLET, FILM COATED ORAL at 21:05

## 2017-08-02 RX ADMIN — SODIUM CHLORIDE, SODIUM LACTATE, POTASSIUM CHLORIDE, AND CALCIUM CHLORIDE 100 ML/HR: 600; 310; 30; 20 INJECTION, SOLUTION INTRAVENOUS at 16:15

## 2017-08-02 RX ADMIN — Medication 10 ML: at 21:05

## 2017-08-02 RX ADMIN — DEXAMETHASONE SODIUM PHOSPHATE 4 MG: 4 INJECTION, SOLUTION INTRA-ARTICULAR; INTRALESIONAL; INTRAMUSCULAR; INTRAVENOUS; SOFT TISSUE at 17:30

## 2017-08-02 RX ADMIN — PROPOFOL 100 MG: 10 INJECTION, EMULSION INTRAVENOUS at 17:21

## 2017-08-02 RX ADMIN — CEFAZOLIN 2 G: 1 INJECTION, POWDER, FOR SOLUTION INTRAMUSCULAR; INTRAVENOUS; PARENTERAL at 17:27

## 2017-08-02 RX ADMIN — Medication 10 ML: at 14:13

## 2017-08-02 RX ADMIN — ACETAMINOPHEN 1000 MG: 500 TABLET ORAL at 06:56

## 2017-08-02 RX ADMIN — ROCURONIUM BROMIDE 15 MG: 10 INJECTION, SOLUTION INTRAVENOUS at 17:30

## 2017-08-02 RX ADMIN — FENTANYL CITRATE 50 MCG: 50 INJECTION, SOLUTION INTRAMUSCULAR; INTRAVENOUS at 17:18

## 2017-08-02 NOTE — PROGRESS NOTES
Met with pt's daughter, Niels Ceballos (c: [phone]/M: [phone]), regarding d/c plan. [facility] is still reviewing and had some questions for the family. Tanner Ybarra, admissions coordinator was provided contact numbers for pt's son, Royal, and Ms. Lima. Ms. Trudy Ngo was informed that the [facility] has already accepted pt. Ab Wallace LCSW     1:15pm:  Pt's family is still interested in LTC at [facility]. Left voice message for Calin Hernandez ([phone]) at [facility].   Ab Wallace LCSW

## 2017-08-02 NOTE — PROGRESS NOTES
Problem: Falls - Risk of  Goal: *Absence of falls  Outcome: Progressing Towards Goal  Frequent checks

## 2017-08-02 NOTE — PERIOP NOTES
Pt. Fall protocol  Yellow armband on patient, yellow non skid socks on  Bed in low position, all side rails up, call bell in reach  Pt. And family instructed in \"call don't fall\" protocol  Use your call bell and wait for assistance, staff not family will assist you to get up and move about  Pt. And family verbalize understanding of fall precautions and \"call don't fall\" protocol    3:27 PM  POA and daughter present with pt.

## 2017-08-02 NOTE — PROGRESS NOTES
[facility] Dosing Services: Antimicrobial Stewardship Daily Doc    Consult for antibiotic dosing of vancomycin by Dr. Hayde Garcia  Pharmacist reviewed antibiotic appropriateness for 80year old , female  for indication of bone and joint infection  Day of Therapy 4    Vancomycin therapy:  Current maintenance dose: 750 mg every 24 hours frequency  Dose calculated to approximate a therapeutic trough of 15-20 mcg/mL. Actual Trough Level = 6.3 at 1730 8/1/17 after 2 doses given on time    Plan: Trough sub-therapeutic. Will increase frequency to every 16 hours for predicted trough closer to 15 and re-check trough level within the next few doses. Will continue to follow and update dosing based on levels/renal function. Other Antimicrobial   (not dosed by pharmacist) Ancef 2g IV q 8 hours - x7 days last 8/5 (ortho)  (ID ON)   Cultures 7/29 wound: NGTD P  7/29 bcx: NGTD P  7/30 ucx: NG final   Serum Creatinine Lab Results   Component Value Date/Time    Creatinine 0.52 08/01/2017 06:21 AM    Creatinine (POC) 0.8 10/11/2009 12:42 PM         Creatinine Clearance Estimated Creatinine Clearance: 47.2 mL/min (based on Cr of 0.52).      Temp Temp: 97.4 °F (36.3 °C)       WBC Lab Results   Component Value Date/Time    WBC 10.1 08/01/2017 06:21 AM            Pharmacist [address] information:

## 2017-08-02 NOTE — PROGRESS NOTES
Regarding Cardiac Clearance for Procedure:    Spoke to DAVID Villatoro, and [facility] who confirmed patient does not require cardiac consult prior to procedure.

## 2017-08-02 NOTE — PROGRESS NOTES
[facility]   Daily Progress Note    Date: 8/3/2017  PCP: Marcella Soares MD     Assessment/Plan: Gareth Rodriguez is a 80 y.o. [place] female with history of HTN and 3rd degree AV block s/p pacer placement who has spent 6 night(s) in the hospital, who is admitted for hip fracture. R Hip Fracture s/p hemiarthroplasty POD#1: due to fall (3yrs ago from son) with necrosis of femoral head and underlying osteoporosis. NG for 5days for cx obtained during the first OR hemiarthoplasty attempt. No osteomyelitis per pathology report. - discontinued Ancef 2g q8h and Vanc 750mg IV - discussed with ortho PA/NP  - Pain meds (managable): tylenol 1000mg, oxycodone 2.5 and 5mg prn with miralax/senna-docusate  - fup outpatient with ortho (Xray of hips, dvt ppx, and weight bearing)  - fup daily CBC, BMP  - ortho and ID following    Asymptomatic hyponatremia - possible due to fluid overload during surgery (r/o'ing out SIADH)  - d/c'ed MIVF  - fup on urine studies  - recheck bmp tomorrow    HTN - stable  - continue home med labetalol 100mg and norvasc 10mg, hydralazine prn with parameters  - continue to monitor and adjust as necessary    3rd degree AV heart block - stable (s/p pacer placement) - on tele     Glaucoma - per son only able to see shadows. Hx of failed cornea graft  - continue home Dorzolamide-timolol, brimonidine, latanoprost    Hypothyroidism - Stable - TSH 1.14 (6/13/2016)   - Continue home synthroid 50mcg dialy    GERD - stable, not on home med    DM type II - A1c 6.5, stable, not on any medication. Asymptomatic UTI/colonization - resolved     FEN/GI - regular diet w/ supplements (ad zaire). D5 1/2NS 50ml/hr  Activity - bedrest  DVT prophylaxis - lovenox  GI prophylaxis -  not indicated  Disposition - Plan to d/c to rehab [facility] - d/c tomorrow. CODE STATUS:  DNR    Pt was discussed with Dr Betty Hawk.      Manda Maldonado MD  Family Medicine Resident         CC: none Subjective  Pt has been comfortable over night per nurse. Limited english, understands better than speak. Denies fever, chills, chest pain, sob. Per son, had been well overnight.  Last [facility] 7/30    Inpatient Medications  Current Facility-Administered Medications   Medication Dose Route Frequency    lidocaine (PF) (XYLOCAINE) 10 mg/mL (1 %) injection 0.1 mL  0.1 mL SubCUTAneous PRN    lactated Ringers infusion  100 mL/hr IntraVENous CONTINUOUS    sodium chloride (NS) flush 5-10 mL  5-10 mL IntraVENous Q8H    sodium chloride (NS) flush 5-10 mL  5-10 mL IntraVENous PRN    sodium chloride (NS) flush 5-10 mL  5-10 mL IntraVENous Q8H    sodium chloride (NS) flush 5-10 mL  5-10 mL IntraVENous PRN    lidocaine (PF) (XYLOCAINE) 10 mg/mL (1 %) injection 0.1 mL  0.1 mL SubCUTAneous PRN    dextrose 5 % - 0.45% NaCl infusion  50 mL/hr IntraVENous CONTINUOUS    vancomycin (VANCOCIN) 750 mg in 0.9% sodium chloride 250 mL IVPB  750 mg IntraVENous Q24H    hydrALAZINE (APRESOLINE) 20 mg/mL injection 10 mg  10 mg IntraVENous Q6H PRN    0.9% sodium chloride infusion 250 mL  250 mL IntraVENous PRN    0.9% sodium chloride infusion 250 mL  250 mL IntraVENous PRN    acetaminophen (TYLENOL) tablet 1,000 mg  1,000 mg Oral Q8H    sodium chloride (NS) flush 5-10 mL  5-10 mL IntraVENous Q8H    sodium chloride (NS) flush 5-10 mL  5-10 mL IntraVENous PRN    naloxone (NARCAN) injection 0.4 mg  0.4 mg IntraVENous PRN    calcium-vitamin D (OS-REGINE) 500 mg-200 unit tablet  1 Tab Oral TID WITH MEALS    senna-docusate (PERICOLACE) 8.6-50 mg per tablet 1 Tab  1 Tab Oral BID    polyethylene glycol (MIRALAX) packet 17 g  17 g Oral DAILY    bisacodyl (DULCOLAX) suppository 10 mg  10 mg Rectal DAILY PRN    oxyCODONE IR (ROXICODONE) tablet 2.5 mg  2.5 mg Oral Q4H PRN    oxyCODONE IR (ROXICODONE) tablet 5 mg  5 mg Oral Q4H PRN    ceFAZolin in 0.9% NS (ANCEF) IVPB soln 2 g  2 g IntraVENous Q8H    enoxaparin (LOVENOX) injection 30 mg  30 mg SubCUTAneous DAILY    amLODIPine (NORVASC) tablet 10 mg  10 mg Oral DAILY    brimonidine (ALPHAGAN P) 0.1 % ophthalmic solution 1 Drop  1 Drop Both Eyes BID    dorzolamide-timolol (COSOPT) 22.3-6.8 mg/mL ophthalmic solution 1 Drop  1 Drop Both Eyes BID    labetalol (NORMODYNE) tablet 100 mg  100 mg Oral BID    latanoprost (XALATAN) 0.005 % ophthalmic solution 1 Drop  1 Drop Both Eyes QHS    levothyroxine (SYNTHROID) tablet 50 mcg  50 mcg Oral ACB    glucose chewable tablet 16 g  4 Tab Oral PRN    dextrose (D50W) injection syrg 12.5-25 g  12.5-25 g IntraVENous PRN    glucagon (GLUCAGEN) injection 1 mg  1 mg IntraMUSCular PRN    sodium chloride (NS) flush 5-10 mL  5-10 mL IntraVENous Q8H    sodium chloride (NS) flush 5-10 mL  5-10 mL IntraVENous PRN         Allergies  Allergies   Allergen Reactions    Ace Inhibitors Cough    Cozaar [Losartan] Angioedema    Hydrochlorothiazide Other (comments) and Vertigo     Weak in legs, HYPONATREMIA (109 - 2014 admission)    Niaspan [Niacin] Nausea Only    Pravastatin Other (comments)     dizzy    Prilosec [Omeprazole] Myalgia    Sular [Nisoldipine] Vertigo         Objective  Vitals:  Visit Vitals    /63 (BP 1 Location: Left arm, BP Patient Position: At rest)    Pulse 71    Temp 97.7 °F (36.5 °C)    Resp 16    Ht 4' 10\" (1.473 m)    Wt 98 lb (44.5 kg)    LMP 1963    SpO2 97%    Breastfeeding No    BMI 20.48 kg/m2      Temp (24hrs), Av.6 °F (36.4 °C), Min:97.2 °F (36.2 °C), Max:98.2 °F (36.8 °C)     O2 Flow Rate (L/min): 2 l/min   O2 Device: Nasal cannula    I/O:    Intake/Output Summary (Last 24 hours) at 17 0510  Last data filed at 17 1910   Gross per 24 hour   Intake                0 ml   Output             1275 ml   Net            -1275 ml     Last 3 shifts:     07 -  1900  In: -   Out: 800 [Urine:800]    Physical Exam  General:   Alert, cooperative, no acute distress   Head:   Atraumatic.     Eye:   clear conjunctiva, slight early cataract b/l   ENT:  Oral mucosa normal   Neck:  Supple, trachea midline, no adenopathy   Lungs:   Clear to auscultation bilaterally    Heart:   Regular rhythm, no murmur   Abdomen:    Soft, non-tender. No masses or organomegaly    Extremities:  TTP on right hip, dressing clean (followed up with ortho). No edema or DVT signs   Skin:  Warm and dry. No rashes or lesions   Neurologic:  Oriented. No focal deficits       Urinary catheter:  external catheter (purewick)         Labs and Data:    Telemetry: ventricular paced    Pathology report 7/29/17:   Right proximal femur, curettings: Articular erosions with underlying bone containing extensive marrow hemorrhage and necrosis. No evidence of active osteomyelitis. There is no evidence of an inflammatory process either within the synovial tissue or within the bone fragments. The bony trabeculae appear viable. Recent Labs      08/03/17   0046  08/02/17   0041  08/01/17   0621   WBC  12.6*  8.1  10.1   HGB  10.4*  11.3*  10.5*   HCT  31.8*  32.8*  32.4*   PLT  357  362  309     Recent Labs      08/03/17   0046  08/02/17   0041  08/01/17   0621   NA  131*  135*  137   K  4.4  3.5  3.9   CL  98  101  103   CO2  26  30  28   GLU  214*  168*  116*   BUN  13  13  15   CREA  0.61  0.57  0.52*   CA  8.6  8.9  9.1   MG  1.7  1.5*  1.6   PHOS  3.9  3.9  3.8       Imaging/procedures:   XR Pelvis 8/2/17:  IMPRESSION:  1. Anatomic alignment right hip hemiarthroplasty.     Signed by: Abner Caldera MD  Resident, Family Medicine  08/03/17       Attending Note:

## 2017-08-02 NOTE — PROGRESS NOTES
Met with pt's daughter, Yonas Lyles (c: [phone]/F: [phone]), regarding d/c plan.  is still reviewing and had some questions for the family. Natan Hart, admissions coordinator was provided contact numbers for pt's son, Royal, and Ms. Lima. Ms. Gavin Arrieta was informed that the [facility] has already accepted pt. Margarito Julian LCSW     1:15pm:  Pt's family is still interested in LTC at [facility]. Left voice message for Chris Hart [phone]) at [facility].   Margarito Julian LCSW

## 2017-08-02 NOTE — PROGRESS NOTES
Appreciate FP and ID care. No growth to date. If pathology results suggest no evidence of infection, would proceed with hemiarthoplasty. Anticipate path results today. NPO.

## 2017-08-02 NOTE — ANESTHESIA PREPROCEDURE EVALUATION
Anesthetic History   No history of anesthetic complications            Review of Systems / Medical History  Patient summary reviewed, nursing notes reviewed and pertinent labs reviewed    Pulmonary  Within defined limits                 Neuro/Psych   Within defined limits           Cardiovascular              Pacemaker      Comments: 3rd degree heart block   GI/Hepatic/Renal     GERD           Endo/Other      Hypothyroidism       Other Findings            Physical Exam    Airway  Mallampati: III  TM Distance: 4 - 6 cm  Neck ROM: normal range of motion   Mouth opening: Normal     Cardiovascular    Rhythm: regular  Rate: normal         Dental    Dentition: Full upper dentures     Pulmonary                 Abdominal         Other Findings            Anesthetic Plan    ASA: 3  Anesthesia type: general            Anesthetic plan and risks discussed with: Patient and Son / Daughter    Hx obtained from chart. Pt does not speak english and no family members present. EKG & type & screen ordered  Informed consent obtained: daughter helped to translate.

## 2017-08-02 NOTE — PROGRESS NOTES
Pathology and micro results reviewed. Plan for hemiarthroplasty. Family in agreement.   No change in H&P.

## 2017-08-02 NOTE — PERIOP NOTES
Spoke to [facility] on 4th floor for report. Asked about cardiac clearance prior to surgery. Informed that family practice was called and no cardiac clearance needed.

## 2017-08-02 NOTE — BRIEF OP NOTE
BRIEF OPERATIVE NOTE    Date of Procedure: 8/2/2017   Preoperative Diagnosis: RIGHT FEMORAL NECK FRACTURE  Postoperative Diagnosis: RIGHT  FEMORAL NECK FRACTURE    Procedure(s):  HIP HEMIARTHROPLASTY RIGHT  Surgeon(s) and Role:     * Kim Meyer MD - Primary         Assistant Staff:       Surgical Staff:  Circ-1: Jorge Gipson RN  Scrub Tech-1: Breanna Bowser  Surg Asst-1: Rebekah Dietz  Event Time In   Incision Start 1753   Incision Close      Anesthesia: General   Estimated Blood Loss: 200  Specimens: * No specimens in log *   Findings: above   Complications: none  Implants:   Implant Name Type Inv.  Item Serial No.  Lot No. LRB No. Used Action   STEM FEM ECHO BI-MTRC [id number] --  - SNA  STEM FEM ECHO BI-MTRC [id number] --  [employer] [id number] Right 1 Implanted   INSERT TAPR BIOMOORE 3MM NK --  - SNA  INSERT TAPR BIOMOORE 3MM NK --  [employer] [id number] Right 1 Implanted   HEAD FEM ENDO BIOMOORE II 41MM --  - SNA   HEAD FEM ENDO BIOMOORE II 41MM --  [employer] [id number] Right 1 Implanted

## 2017-08-02 NOTE — PROGRESS NOTES
Per Dr. Lauryn Victor, Path results are negative for infection. Plan for OR today at 1630hrs for surgery. [facility] [place], [place]  Orthopaedic Surgery [address]  Pgr.  [phone]

## 2017-08-02 NOTE — PROGRESS NOTES
[facility]   Daily Progress Note    Date: 8/2/2017  PCP: Emeka Walker MD     Assessment/Plan: Clif Parks is a 80 y.o. [place] female with history of HTN and 3rd degree AV block s/p pacer placement who has spent 5 night(s) in the hospital, who is admitted for hip fracture. Hip Fracture with possible osteomyelitis s/p femur head resection/debridment: possibly due to fall (3yrs ago from son) and/or necrosis of femoral head with underlying osteoporosis. Purulent material found in OR pending culture growth- unclear source - possible liquifed bone   - spoke with ortho PA - plan is to return to OR on 8/2/17 for [place] procedure if NG on purulent cx     - continue on Ancef 2g q8h and Vanc 750mg IV day 5 (start on 7/29, pharmacy dosing, continue to check therapeutic levels and increase as needed)  - BCx and wound Cx from 7/29 NG for 4days   - ortho and ID following  - Pain meds (managable): tylenol 1000mg, oxycodone 2.5 and 5mg prn   - fup daily CBC, BMP, mg, and phos    HTN - stable  - continue home med labetalol 100mg and norvasc 10mg, added hydralazine prn with parameters  - continue to monitor and adjust as necessary    3rd degree AV heart block - stable (s/p pacer placement) - on tele     Glaucoma - per son only able to see shadows. Hx of failed cornea graft  - continue home Dorzolamide-timolol, brimonidine, latanoprost    Hypothyroidism - Stable - TSH 1.14 (6/13/2016)   - Continue home synthroid 50mcg dialy    GERD - stable, not on home med    DM type II - A1c 6.5, stable, not on any medication. Asymptomatic UTI/colonization - resolved     FEN/GI - mechanical soft diet w/ supplements (ad zaire). NPO after early breakfast  Activity - bedrest  DVT prophylaxis - lovenox  GI prophylaxis -  not indicated  Disposition - Plan to d/c to rehab (awaiting first choice - [place], accepted by [facility]) and afterwards can do long-term nursing home.      CODE STATUS: DNR    Mohini Foley MD  Family Medicine Resident         CC: none     Subjective  Pt has been comfortable over night per nurse. Patient speaks limited [address] Per son, manageable pain (only when moving). Denies fever, chills, chest pain, palpitations, sob, nausea and vomiting. Tolerating diet well. Having nml BM.  Last [facility] 7/30    Inpatient Medications  Current Facility-Administered Medications   Medication Dose Route Frequency    vancomycin (VANCOCIN) 750 mg in 0.9% sodium chloride 250 mL IVPB  750 mg IntraVENous Q16H    hydrALAZINE (APRESOLINE) 20 mg/mL injection 10 mg  10 mg IntraVENous Q6H PRN    0.9% sodium chloride infusion 250 mL  250 mL IntraVENous PRN    0.9% sodium chloride infusion 250 mL  250 mL IntraVENous PRN    acetaminophen (TYLENOL) tablet 1,000 mg  1,000 mg Oral Q8H    sodium chloride (NS) flush 5-10 mL  5-10 mL IntraVENous Q8H    sodium chloride (NS) flush 5-10 mL  5-10 mL IntraVENous PRN    naloxone (NARCAN) injection 0.4 mg  0.4 mg IntraVENous PRN    calcium-vitamin D (OS-REGINE) 500 mg-200 unit tablet  1 Tab Oral TID WITH MEALS    senna-docusate (PERICOLACE) 8.6-50 mg per tablet 1 Tab  1 Tab Oral BID    polyethylene glycol (MIRALAX) packet 17 g  17 g Oral DAILY    bisacodyl (DULCOLAX) suppository 10 mg  10 mg Rectal DAILY PRN    oxyCODONE IR (ROXICODONE) tablet 2.5 mg  2.5 mg Oral Q4H PRN    oxyCODONE IR (ROXICODONE) tablet 5 mg  5 mg Oral Q4H PRN    ceFAZolin in 0.9% NS (ANCEF) IVPB soln 2 g  2 g IntraVENous Q8H    enoxaparin (LOVENOX) injection 30 mg  30 mg SubCUTAneous DAILY    amLODIPine (NORVASC) tablet 10 mg  10 mg Oral DAILY    brimonidine (ALPHAGAN P) 0.1 % ophthalmic solution 1 Drop  1 Drop Both Eyes BID    dorzolamide-timolol (COSOPT) 22.3-6.8 mg/mL ophthalmic solution 1 Drop  1 Drop Both Eyes BID    labetalol (NORMODYNE) tablet 100 mg  100 mg Oral BID    latanoprost (XALATAN) 0.005 % ophthalmic solution 1 Drop  1 Drop Both Eyes QHS    levothyroxine (SYNTHROID) tablet 50 mcg  50 mcg Oral ACB    glucose chewable tablet 16 g  4 Tab Oral PRN    dextrose (D50W) injection syrg 12.5-25 g  12.5-25 g IntraVENous PRN    glucagon (GLUCAGEN) injection 1 mg  1 mg IntraMUSCular PRN    sodium chloride (NS) flush 5-10 mL  5-10 mL IntraVENous Q8H    sodium chloride (NS) flush 5-10 mL  5-10 mL IntraVENous PRN         Allergies  Allergies   Allergen Reactions    Ace Inhibitors Cough    Cozaar [Losartan] Angioedema    Hydrochlorothiazide Other (comments) and Vertigo     Weak in legs, HYPONATREMIA (109 - 2014 admission)    Niaspan [Niacin] Nausea Only    Pravastatin Other (comments)     dizzy    Prilosec [Omeprazole] Myalgia    Sular [Nisoldipine] Vertigo         Objective  Vitals:  Visit Vitals    /70 (BP 1 Location: Left arm, BP Patient Position: At rest)    Pulse 77    Temp 97 °F (36.1 °C)    Resp 17    Ht 4' 10\" (1.473 m)    Wt 98 lb (44.5 kg)    LMP 1963    SpO2 98%    Breastfeeding No    BMI 20.48 kg/m2      Temp (24hrs), Av.6 °F (36.4 °C), Min:97 °F (36.1 °C), Max:99 °F (37.2 °C)     O2 Flow Rate (L/min): 3 l/min   O2 Device: Room air    I/O:  No intake or output data in the 24 hours ending 17  Last 3 shifts:     0701 -  1900  In: -   Out: 500 [Urine:500]    Physical Exam  General:   Alert, cooperative, no acute distress   Head:   Atraumatic. Eye:   clear conjunctiva, slight early cataract b/l   ENT:  Oral mucosa normal   Neck:  Supple, trachea midline, no adenopathy   Lungs:   Clear to auscultation bilaterally    Heart:   Regular rhythm, no murmur   Abdomen:    Soft, non-tender. No masses or organomegaly    Extremities:  TTP on right hip, no petechiae/ecchymosis. No edema or DVT signs   Skin:  Warm and dry. No rashes or lesions   Neurologic:  Oriented.   No focal deficits       Urinary catheter:  external catheter (purewick)         Labs and Data:    Telemetry: ventricular paced    Recent Labs      17   0041 08/01/17   0621  07/31/17   0301   WBC  8.1  10.1  9.4   HGB  11.3*  10.5*  9.9*   HCT  32.8*  32.4*  29.6*   PLT  362  309  307     Recent Labs      08/02/17   0041  08/01/17   0621  07/31/17   0301   NA  135*  137  138   K  3.5  3.9  3.2*   CL  101  103  103   CO2  30  28  28   GLU  168*  116*  128*   BUN  13  15  11   CREA  0.57  0.52*  0.61   CA  8.9  9.1  8.9   MG  1.5*  1.6  1.7   PHOS  3.9  3.8  3.4       Imaging/procedures: none    Signed by: Manda Maldonado MD  Resident, Family Medicine  08/02/17       Attending Note:  [facility] Residency Attending Addendum:  I saw and evaluated the patient, performing the key elements of the service. I discussed the findings, assessment and plan with the resident and agree with the resident's findings and plan as documented in the resident's note.     The patient was seen on 8/2/17    Vitals:    08/03/17 2300 08/03/17 2312 08/04/17 0407 08/04/17 0844   BP:  109/60 114/65 114/65   Pulse: 66 70 67 67   Resp:  16 16    Temp:  98.2 °F (36.8 °C) 98.4 °F (36.9 °C)    SpO2:  98% 97%    Weight:       Height:       LMP: 03/05/1963     NAD      Assessment/Plan:   S/p right hip fracture-stable  Continue current care    Hospital Problems  Date Reviewed: 6/14/2017          Codes Class Noted POA    Hyponatremia ICD-10-CM: E87.1  ICD-9-CM: 276.1  8/3/2017 No        * (Principal)Hip fracture, right ([address] ICD-10-CM: S72.001A  ICD-9-CM: 820.8  7/28/2017 Yes        HTN (hypertension) ICD-10-CM: I10  ICD-9-CM: 401.9  2/15/2011 Yes    Overview Signed 5/28/2013  9:13 AM by Marcella Soares MD     Goal < 150 SBP             GERD (gastroesophageal reflux disease) ICD-10-CM: K21.9  ICD-9-CM: 530.81  2/15/2011 Yes    Overview Signed 4/3/2012  1:25 PM by Marcella Soares MD      2008             Hypothyroid ICD-10-CM: E03.9  ICD-9-CM: 244.9  2/15/2011 Yes

## 2017-08-02 NOTE — MED STUDENT NOTES
*ATTENTION:  This note has been created by a medical student for educational purposes only. Please do not refer to the content of this note for clinical decision-making, billing, or other purposes. Please see attending physicians note to obtain clinical information on this patient. *     Pt info: Ms. Damir Parker is a 81 yo female with hx of HTN, 3rd degree heart block s/p pacer, DM, and hypothyroidism admitted for R hip fracture. Subjective:  No acute events overnight. No pain this morning, but does have some stiffness in her back from lying in the bed. She is tolerating po well. Objective:  Patient Vitals for the past 12 hrs:   Temp Pulse Resp BP SpO2   08/02/17 0240 97 °F (36.1 °C) 77 17 142/70 98 %   08/01/17 2256 - 68 - - -   08/01/17 2233 97 °F (36.1 °C) 72 17 129/62 98 %     Physical Exam:  General: in no acute distress  CV: RRR, normal peripheral perfusion  Lungs: no increased work of breathing    Labs:      CBC WITH AUTOMATED DIFF    Collection Time: 08/02/17 12:41 AM   Result Value Ref Range    WBC 8.1 3.6 - 11.0 K/uL    RBC 3.74 (L) 3.80 - 5.20 M/uL    HGB 11.3 (L) 11.5 - 16.0 g/dL    HCT 32.8 (L) 35.0 - 47.0 %    MCV 87.7 80.0 - 99.0 FL    MCH 30.2 26.0 - 34.0 PG    MCHC 34.5 30.0 - 36.5 g/dL    RDW 14.5 11.5 - 14.5 %    PLATELET 453 402 - 365 K/uL    NEUTROPHILS 72 32 - 75 %    LYMPHOCYTES 19 12 - 49 %    MONOCYTES 5 5 - 13 %    EOSINOPHILS 4 0 - 7 %    BASOPHILS 0 0 - 1 %    ABS. NEUTROPHILS 5.8 1.8 - 8.0 K/UL    ABS. LYMPHOCYTES 1.6 0.8 - 3.5 K/UL    ABS. MONOCYTES 0.4 0.0 - 1.0 K/UL    ABS. EOSINOPHILS 0.3 0.0 - 0.4 K/UL    ABS.  BASOPHILS 0.0 0.0 - 0.1 K/UL     BMP 8/2:  Na 135  K 3.5  Glu 168  Cr 0.57  Mg 1.5    Vanc trough 8/1 @ 17:30: 6.3 (goal 15-20)    Microbiology:   7/29 Wound culture: NG after 4 days (final report)  7/29 Blood culture: NGTD  7/30 Urine culture: NGTD    Imaging:  Bilateral LE Duplex Scan 7/31: negative for DVTs or thrombophlebitis    Current Facility-Administered Medications   Medication Dose Route Frequency    vancomycin (VANCOCIN) 750 mg in 0.9% sodium chloride 250 mL IVPB  750 mg IntraVENous Q16H    hydrALAZINE (APRESOLINE) 20 mg/mL injection 10 mg  10 mg IntraVENous Q6H PRN    0.9% sodium chloride infusion 250 mL  250 mL IntraVENous PRN    0.9% sodium chloride infusion 250 mL  250 mL IntraVENous PRN    acetaminophen (TYLENOL) tablet 1,000 mg  1,000 mg Oral Q8H    sodium chloride (NS) flush 5-10 mL  5-10 mL IntraVENous Q8H    sodium chloride (NS) flush 5-10 mL  5-10 mL IntraVENous PRN    naloxone (NARCAN) injection 0.4 mg  0.4 mg IntraVENous PRN    calcium-vitamin D (OS-REGINE) 500 mg-200 unit tablet  1 Tab Oral TID WITH MEALS    senna-docusate (PERICOLACE) 8.6-50 mg per tablet 1 Tab  1 Tab Oral BID    polyethylene glycol (MIRALAX) packet 17 g  17 g Oral DAILY    bisacodyl (DULCOLAX) suppository 10 mg  10 mg Rectal DAILY PRN    oxyCODONE IR (ROXICODONE) tablet 2.5 mg  2.5 mg Oral Q4H PRN    oxyCODONE IR (ROXICODONE) tablet 5 mg  5 mg Oral Q4H PRN    ceFAZolin in 0.9% NS (ANCEF) IVPB soln 2 g  2 g IntraVENous Q8H    enoxaparin (LOVENOX) injection 30 mg  30 mg SubCUTAneous DAILY    amLODIPine (NORVASC) tablet 10 mg  10 mg Oral DAILY    brimonidine (ALPHAGAN P) 0.1 % ophthalmic solution 1 Drop  1 Drop Both Eyes BID    dorzolamide-timolol (COSOPT) 22.3-6.8 mg/mL ophthalmic solution 1 Drop  1 Drop Both Eyes BID    labetalol (NORMODYNE) tablet 100 mg  100 mg Oral BID    latanoprost (XALATAN) 0.005 % ophthalmic solution 1 Drop  1 Drop Both Eyes QHS    levothyroxine (SYNTHROID) tablet 50 mcg  50 mcg Oral ACB    glucose chewable tablet 16 g  4 Tab Oral PRN    dextrose (D50W) injection syrg 12.5-25 g  12.5-25 g IntraVENous PRN    glucagon (GLUCAGEN) injection 1 mg  1 mg IntraMUSCular PRN    sodium chloride (NS) flush 5-10 mL  5-10 mL IntraVENous Q8H    sodium chloride (NS) flush 5-10 mL  5-10 mL IntraVENous PRN     Assessment/Plan:  Ms. Tano Mercer is a 81 yo female with hx of HTN, 3rd degree heart block, hypothyroidism, and T2DM who was admitted for a R hip fracture after fall. Hip fracture: POD4 s/p R femoral head resection, fracture 2/2 osteoporosis or previous infection causing necrosis of femoral head. Ortho did not recommend hemiarthroplasty due to concern for infection/purulent discharge during exploration. Pt with hx of previous hairline fracture 3 years ago. Wound cultures remain negative, and ortho plans for R hemiarthroplasty this evening, NPO after breakfast.  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  -Continue home dorzolamide-timolol, brimonidine, latanoprost    Hypothyroidism, stable  -Continue home synthroid 50 mcg daily    HTN, stable  -Continue home labetalol 100 mg BID and norvasc 10 mg  -IV hydralazine 10 mg prn for SBP >160 or DBP >100    3rd degree heart block, stable, s/p pacer, cardiology has cleared for procedure    T2DM, stable with last A1C 6.5% July 2017  -Continue healthy diet, not managed on medications    Hypomagnesemia/Hypokalemia, stable, repleting  -Continue to monitor with daily BMP    F: none  E: replete prn  N: mechanical soft diet, NPO after breakfast for possible procedure 8/2  DVT ppx: lovenox 30 mg sq daily  Code: DNR  Dispo: working with case management, plan to d/c to [facility]    Payal Ghosh, [address] [place] [address]    I reviewed the patient's medical history, the resident's findings on physical examination, the patient's diagnoses, and treatment plan as documented in the resident note. I concur with the treatment plan as documented. Additional suggestions noted.

## 2017-08-03 PROBLEM — E87.1 HYPONATREMIA: Status: ACTIVE | Noted: 2017-08-03

## 2017-08-03 LAB
ANION GAP BLD CALC-SCNC: 7 MMOL/L (ref 5–15)
BASOPHILS # BLD AUTO: 0 K/UL (ref 0–0.1)
BASOPHILS # BLD: 0 % (ref 0–1)
BUN SERPL-MCNC: 13 MG/DL (ref 6–20)
BUN/CREAT SERPL: 21 (ref 12–20)
CALCIUM SERPL-MCNC: 8.6 MG/DL (ref 8.5–10.1)
CHLORIDE SERPL-SCNC: 98 MMOL/L (ref 97–108)
CO2 SERPL-SCNC: 26 MMOL/L (ref 21–32)
CREAT SERPL-MCNC: 0.61 MG/DL (ref 0.55–1.02)
DIFFERENTIAL METHOD BLD: ABNORMAL
EOSINOPHIL # BLD: 0 K/UL (ref 0–0.4)
EOSINOPHIL NFR BLD: 0 % (ref 0–7)
ERYTHROCYTE [DISTWIDTH] IN BLOOD BY AUTOMATED COUNT: 14.4 % (ref 11.5–14.5)
GLUCOSE SERPL-MCNC: 214 MG/DL (ref 65–100)
HCT VFR BLD AUTO: 31.8 % (ref 35–47)
HGB BLD-MCNC: 10.4 G/DL (ref 11.5–16)
LYMPHOCYTES # BLD AUTO: 7 % (ref 12–49)
LYMPHOCYTES # BLD: 0.9 K/UL (ref 0.8–3.5)
MAGNESIUM SERPL-MCNC: 1.7 MG/DL (ref 1.6–2.4)
MCH RBC QN AUTO: 29.1 PG (ref 26–34)
MCHC RBC AUTO-ENTMCNC: 32.7 G/DL (ref 30–36.5)
MCV RBC AUTO: 89.1 FL (ref 80–99)
MONOCYTES # BLD: 0.3 K/UL (ref 0–1)
MONOCYTES NFR BLD AUTO: 2 % (ref 5–13)
NEUTS SEG # BLD: 11.4 K/UL (ref 1.8–8)
NEUTS SEG NFR BLD AUTO: 91 % (ref 32–75)
PHOSPHATE SERPL-MCNC: 3.9 MG/DL (ref 2.6–4.7)
PLATELET # BLD AUTO: 357 K/UL (ref 150–400)
PLATELET COMMENTS,PCOM: ABNORMAL
POTASSIUM SERPL-SCNC: 4.4 MMOL/L (ref 3.5–5.1)
RBC # BLD AUTO: 3.57 M/UL (ref 3.8–5.2)
RBC MORPH BLD: ABNORMAL
SODIUM SERPL-SCNC: 131 MMOL/L (ref 136–145)
WBC # BLD AUTO: 12.6 K/UL (ref 3.6–11)

## 2017-08-03 PROCEDURE — 97161 PT EVAL LOW COMPLEX 20 MIN: CPT

## 2017-08-03 PROCEDURE — 36415 COLL VENOUS BLD VENIPUNCTURE: CPT | Performed by: ORTHOPAEDIC SURGERY

## 2017-08-03 PROCEDURE — 97530 THERAPEUTIC ACTIVITIES: CPT

## 2017-08-03 PROCEDURE — 80048 BASIC METABOLIC PNL TOTAL CA: CPT | Performed by: ORTHOPAEDIC SURGERY

## 2017-08-03 PROCEDURE — 65660000000 HC RM CCU STEPDOWN

## 2017-08-03 PROCEDURE — 74011250637 HC RX REV CODE- 250/637: Performed by: STUDENT IN AN ORGANIZED HEALTH CARE EDUCATION/TRAINING PROGRAM

## 2017-08-03 PROCEDURE — 74011250636 HC RX REV CODE- 250/636: Performed by: ORTHOPAEDIC SURGERY

## 2017-08-03 PROCEDURE — 97116 GAIT TRAINING THERAPY: CPT

## 2017-08-03 PROCEDURE — 85025 COMPLETE CBC W/AUTO DIFF WBC: CPT | Performed by: ORTHOPAEDIC SURGERY

## 2017-08-03 PROCEDURE — 97165 OT EVAL LOW COMPLEX 30 MIN: CPT

## 2017-08-03 PROCEDURE — 74011250637 HC RX REV CODE- 250/637: Performed by: ORTHOPAEDIC SURGERY

## 2017-08-03 PROCEDURE — 83735 ASSAY OF MAGNESIUM: CPT | Performed by: ORTHOPAEDIC SURGERY

## 2017-08-03 PROCEDURE — 97535 SELF CARE MNGMENT TRAINING: CPT

## 2017-08-03 PROCEDURE — 84100 ASSAY OF PHOSPHORUS: CPT | Performed by: ORTHOPAEDIC SURGERY

## 2017-08-03 RX ORDER — ENOXAPARIN SODIUM 100 MG/ML
30 INJECTION SUBCUTANEOUS EVERY 24 HOURS
Status: CANCELLED | OUTPATIENT
Start: 2017-08-03

## 2017-08-03 RX ORDER — SODIUM CHLORIDE 0.9 % (FLUSH) 0.9 %
5-10 SYRINGE (ML) INJECTION EVERY 8 HOURS
Status: CANCELLED | OUTPATIENT
Start: 2017-08-03

## 2017-08-03 RX ORDER — SODIUM CHLORIDE 0.9 % (FLUSH) 0.9 %
5-10 SYRINGE (ML) INJECTION AS NEEDED
Status: CANCELLED | OUTPATIENT
Start: 2017-08-03

## 2017-08-03 RX ADMIN — BRIMONIDINE TARTRATE 1 DROP: 1 SOLUTION/ DROPS OPHTHALMIC at 08:07

## 2017-08-03 RX ADMIN — Medication 10 ML: at 14:00

## 2017-08-03 RX ADMIN — BRIMONIDINE TARTRATE 1 DROP: 1 SOLUTION/ DROPS OPHTHALMIC at 21:49

## 2017-08-03 RX ADMIN — Medication 10 ML: at 21:49

## 2017-08-03 RX ADMIN — OYSTER SHELL CALCIUM WITH VITAMIN D 1 TABLET: 500; 200 TABLET, FILM COATED ORAL at 08:04

## 2017-08-03 RX ADMIN — OYSTER SHELL CALCIUM WITH VITAMIN D 1 TABLET: 500; 200 TABLET, FILM COATED ORAL at 17:00

## 2017-08-03 RX ADMIN — DORZOLAMIDE HYDROCHLORIDE AND TIMOLOL MALEATE 1 DROP: 20; 5 SOLUTION/ DROPS OPHTHALMIC at 21:49

## 2017-08-03 RX ADMIN — Medication 10 ML: at 05:55

## 2017-08-03 RX ADMIN — AMLODIPINE BESYLATE 10 MG: 5 TABLET ORAL at 18:00

## 2017-08-03 RX ADMIN — LATANOPROST 1 DROP: 50 SOLUTION OPHTHALMIC at 21:49

## 2017-08-03 RX ADMIN — LEVOTHYROXINE SODIUM 50 MCG: 0.05 TABLET ORAL at 05:54

## 2017-08-03 RX ADMIN — ACETAMINOPHEN 1000 MG: 500 TABLET ORAL at 21:48

## 2017-08-03 RX ADMIN — ACETAMINOPHEN 1000 MG: 500 TABLET ORAL at 05:55

## 2017-08-03 RX ADMIN — ACETAMINOPHEN 1000 MG: 500 TABLET ORAL at 14:00

## 2017-08-03 RX ADMIN — LABETALOL HCL 100 MG: 100 TABLET, FILM COATED ORAL at 18:00

## 2017-08-03 RX ADMIN — CEFAZOLIN 2 G: 1 INJECTION, POWDER, FOR SOLUTION INTRAMUSCULAR; INTRAVENOUS; PARENTERAL at 08:02

## 2017-08-03 RX ADMIN — ENOXAPARIN SODIUM 30 MG: 30 INJECTION SUBCUTANEOUS at 08:07

## 2017-08-03 RX ADMIN — DOCUSATE SODIUM AND SENNOSIDES 1 TABLET: 50; 8.6 TABLET, FILM COATED ORAL at 18:00

## 2017-08-03 RX ADMIN — CEFAZOLIN 2 G: 1 INJECTION, POWDER, FOR SOLUTION INTRAMUSCULAR; INTRAVENOUS; PARENTERAL at 00:38

## 2017-08-03 RX ADMIN — DORZOLAMIDE HYDROCHLORIDE AND TIMOLOL MALEATE 1 DROP: 20; 5 SOLUTION/ DROPS OPHTHALMIC at 08:08

## 2017-08-03 RX ADMIN — OYSTER SHELL CALCIUM WITH VITAMIN D 1 TABLET: 500; 200 TABLET, FILM COATED ORAL at 11:56

## 2017-08-03 RX ADMIN — LABETALOL HCL 100 MG: 100 TABLET, FILM COATED ORAL at 08:04

## 2017-08-03 NOTE — OP NOTES
Bautista Ananda [facility]   [address] [facility], [address]   OP NOTE       Name:  Emily Vazquez   MR#:  [id number]   :  1923   Account #:  [de-identified]    Surgery Date:  2017   Date of Adm:  2017       PREOPERATIVE DIAGNOSIS: Displaced right femoral neck fracture   status post femoral head resection. POSTOPERATIVE DIAGNOSIS: Displaced right femoral neck fracture   status post femoral head resection. PROCEDURES PERFORMED: Right hip hemiarthroplasty. COMPLICATIONS: None. ANESTHESIA: General.    ESTIMATED BLOOD LOSS: 200 mL. SPECIMENS REMOVED: None. IMPLANTS: Biomet size 9 Echo stem, using a 41 monopolar head and   a -3 neck. FINDINGS: No evidence of purulence in the capsule. Clean wound   bed. POSTOPERATIVE PLAN: Lovenox for 4 weeks. Weightbear as   tolerated, posterior hip precautions. Return in 2 weeks and x-ray of the   hip and pelvis. INDICATIONS FOR PROCEDURE: The patient is a 80-year-old   female who sustained a right femoral neck fracture, presumably in a   fall several days prior to her presentation on 2017. She   apparently walked on this for several days and then was unable to   walk thereafter. She was taken to the operating room on 2017,   and there was noted to be some purulent-appearing material in the   capsule. This was cultured and the femoral head was sent for   pathology. The pathology and microbiology cultures demonstrated no   evidence of purulent material. As such, this was thought to be liquified   bone from trauma and poor bone quality. As such, decision was made   to proceed with hip hemiarthroplasty to improve pain control, and   quality of life and minimize the sequelae of long-term bed rest   associated with continued treatment with the [place] type of   procedure. Family signed informed consent. DESCRIPTION OF PROCEDURE: The patient was seen and identified   in the preanesthesia care unit.  The operative site was marked. Preoperative questions were invited and answered. The patient was   evaluated by the anesthesia team and brought to the operative suite   on a stretcher. General anesthesia was induced. She was then   transferred to lateral decubitus position after she was transferred to the   operating room table. Bony prominences well-padded. An axillary roll   was placed. She was prepped and draped in the usual fashion. A time-  out was undertaken, confirming the appropriate site, side and   procedure. Next, the patient's prior surgical incision was opened up, the sutures   were removed. The IT band was divided from prior dissection, as was   the piriformis repair and capsular repair were noted to be intact. The   wound was copiously irrigated. Any hematoma was evacuated. No   purulence was encountered. At this point, the femur was prepared. The box cutting guide was used   after the remaining muscle from the piriformis fossa was excised   sharply, with care taken to protect the abductor mechanism. The canal   finder was then used, followed by sequential reamers up to a size 9. Then, sequential broaches were used with excellent fit at a size 9   without any toggling. This was then trialed with a -3 neck and a   standard offset and was noted to be quite stable with an appropriate   leg length. At this point, trial components were removed. The final stem   was then implanted, the trial head and neck were once again trialed   with good fit, the hip was then dislocated, the final head was implanted. At this point, wound was copiously irrigated. The capsule was closed   with 0 Vicryl. The piriformis was similarly closed with 0 Vicryl. The IT   band was closed with running quilt suture and the subcutaneous   tissues was closed with 2-0 Vicryl. Skin was closed with Monocryl and   Dermabond. A sterile dressing was applied.         MD ANNE Martell / MOMO   D:  08/02/2017   18:48   T: 08/02/2017   21:19   Job #:  [id number]

## 2017-08-03 NOTE — PROGRESS NOTES
Post Operative Note     Patient is doing well POD0 s/p hemiarthroplasty for R hip fracture. States she is not in any pain. Resting comfortably in bed with family at bedside. Surgery went well, no complications, EBL [id number] per operative note. No evidence of purulence in capsule. Visit Vitals    /51    Pulse 68    Temp 97.6 °F (36.4 °C)    Resp 24    Ht 4' 10\" (1.473 m)    Wt 98 lb (44.5 kg)    SpO2 100%    Breastfeeding No    BMI 20.48 kg/m2     Physical Exam:  Gen: elderly female in NAD, laying comfortably in bed  Cardio: RRR, S1 and S2 present, no m/r/g, 2+ radial pulses B/L  Lungs: CTA B/L, no wheezes, rales or ronchi  Abd: soft, nontender in all 4 quadrants, no guarding or distention, no masses  Lower Ext: R hip/ leg splinted, no edema or tenderness B/L     Labs/ Diagnostic Imaging  H/H pre-op - 29.6/9.9    81 YO female with hx of HTN and 3rd degree AV block s/p pacer placement admitted for R hip fracture. 1. R Hip Fracture s/p Hemiarthroplasty POD0   - Fall 3 years ago per son with necrosis of femoral head and underlying osteoporosis, cx obtained with first hemiarthroplasty attempt NG x5 days. No osteomyelitis per path report. - Ancef 2g q8h and Vanc 750mg IV  - Ortho and ID following   - Pain meds: Acetaminophin 1g q8h PRN, Roxicodone 5mg PO q4h PRN,   - Ducolax 10mg PRN daily, Miralax daily, Pericolace BID,   - Anticoagulate with Lovenox for 4 weeks per Dr. Miriam Flores as tolerated per Dr. Kelly Chu f/u with Ortho in 2 weeks for XR of hip and pelvis per Dr. Kelly Mario   - F/U with daily CBC, BMP, Mg, and Phos    2. HTN - stable  - continue Amlodipine 10mg PO daily, Labetolol 100mg PO BID,     3. 3rd degree AV heart block - stable (s/p pacer placement) - on tele    4. Glaucoma - per son only able to see shadows. Hx of failed cornea graft. - continue Alphagan solution BID, Cosopt solution BID, Xalatan solution daily,     5.  Hypothyroidism - Stable - TSH 1.14 (6/13/16)  - continue Synthroid 50 mcg PO daily    6. GERD - stable, not on home meds    7. DM Type II - A1c 6.5, stable, diet controlled    8.  Asymptomatic UTI/ colonization - resolved    Patient seen and discussed with Dr. Tin Mcmahon MD (PGY2)

## 2017-08-03 NOTE — PROGRESS NOTES
08/03/17 11:29 AM  Patient's son and dtr had questions regarding discharge tomorrow regarding time and transport. Process explained, family in agreement. 08/03/17 10:53 AM  CM participated in rounds with residents and attending, will plan for discharge Friday AM.  Updated group and family that updates were sent this morning. CM updated [facility] on plan for discharge Friday AM, they have accepted. CM also requested room close to nurse's station, facility can accommodate. 08/03/17 8:47 AM  Spoke with Paul Anaya in admissions at [facility]. Updates sent via fax to [phone].     LUIS Carrillo

## 2017-08-03 NOTE — ROUTINE PROCESS
Orthopedic & Surgical Nursing Communication Tool      8:23 AM  8/3/2017     Bedside and Verbal shift change report given to Jett Rowan RN (incoming nurse) by Charli Moon RN (outgoing nurse) on [facility] a 80 y.o. female who was admitted on 7/28/2017  1:28 PM. Report included the following information SBAR, OR Summary, Procedure Summary and Intake/Output. Opportunity for questions and clarifications were given to the incoming nurse. Patient's bed is in low position, side rails x3  , bed alarm on, call bell within reach and patient not in distress. Hourly rounding performed throughout shift. Charli Moon RN                  Orthopedic & Surgical Nursing Communication Tool      8:23 AM  8/3/2017     Bedside and Verbal shift change report given to Olinda Aguilar RN (incoming nurse) by Charli Moon RN (outgoing nurse) on [facility] a 80 y.o. female who was admitted on 7/28/2017  1:28 PM. Report included the following information SBAR and ED Summary. Opportunity for questions and clarifications were given to the incoming nurse. Patient's bed is in low position, side rails x3, bed alarm on, call bell within reach and patient not in distress. Hourly rounding performed throughout shift.     Charli Moon RN

## 2017-08-03 NOTE — PROGRESS NOTES
Spiritual Care Assessment/Progress Notes    Pranay Lawson [id number]  [id number]    1/1/1923  80 y.o.  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 pylori infection 05/28/2013    Angioedema 04/30/2013    Glaucoma 04/03/2012    Hot flashes 09/26/2011    Murmur, cardiac 08/30/2011    Other and unspecified hyperlipidemia 02/15/2011    HTN (hypertension) 02/15/2011    GERD (gastroesophageal reflux disease) 02/15/2011    Hypothyroid 02/15/2011    Syncope and collapse 02/15/2011    [facility] 02/15/2011        Date: 8/3/2017       Level of Christian/Spiritual Activity:  []         Involved in tarik tradition/spiritual practice    []         Not involved in tarik tradition/spiritual practice  []         Spiritually oriented    []         Claims no spiritual orientation    []         seeking spiritual identity  []         Feels alienated from Muslim practice/tradition  []         Feels angry about Muslim practice/tradition  []         Spirituality/Muslim tradition is a resource for coping at this time.   [x]         Not able to assess due to medical condition    Services Provided Today:  []         crisis intervention    []         reading Scriptures  []         spiritual assessment    []         prayer  []         empathic listening/emotional support  []         rites and rituals (cite in comments)  []         life review     []         Druze support  []         theological development   []         advocacy  []         ethical dialog     []         blessing  []         bereavement support    []         support to family  []         anticipatory grief support   []         help with AMD  []         spiritual guidance    []         meditation      Spiritual Care Needs  []         Emotional Support  []         Spiritual/Denominational Care  []         Loss/Adjustment  []         Advocacy/Referral                /Ethics  []         No needs expressed at               this time  [x]         Other: (note in               comments)  Spiritual Care Plan  []         Follow up visits with               pt/family  []         Provide materials  []         Schedule sacraments  []         Contact Community               Clergy  [x]         Follow up as needed  []         Other: (note in               comments)     Comments: Attempted Initial Spiritual Assessment in 419. Unable to assess due to pt being preoccupied with medical staff at the time. No family present at this time. Chaplains will follow as able and/or as need.     [id number] Erin Urias M.Div, M.S, Nona Hilton3 available at [phone]

## 2017-08-03 NOTE — PROGRESS NOTES
Problem: Mobility Impaired (Adult and Pediatric)  Goal: *Acute Goals and Plan of Care (Insert Text)  Physical Therapy Goals  Initiated 8/3/2017    1. Patient will move from supine to sit and sit to supine in bed with minimal assistance/contact guard assist within 4 days. 2. Patient will perform sit to stand with minimal assistance/contact guard assist within 4 days. 3. Patient will ambulate with minimal assistance/contact guard assist for 10 feet with the least restrictive device within 4 days. 4. Patient will verbalize and demonstrate understanding of hip precautions per protocol within 4 days. 5. Patient will perform hip home exercise program per protocol with minimal assistance/contact guard assist within 4 days. PHYSICAL THERAPY EVALUATION  Patient: Nicole Sorensen (80 y.o. female)  Date: 8/3/2017  Primary Diagnosis: Hip fracture, right (HCC)  RIGHT HIP FRACTURE  RIGHT HIP FRACTURE  Procedure(s) (LRB):  HIP HEMIARTHROPLASTY RIGHT (Right) 1 Day Post-Op   Precautions:   Fall, WBAT, Skin      ASSESSMENT :  Based on the objective data described below, the patient presents with increased R hip pain with mobility, impaired sitting and standing balance, decreased ROM, generalized weakness and functional mobility below her baseline level of Min A/CGA with mobility secondary to R hip hemiarthroplasty POD#1. PTA pt was ambulating household distances with RW and has assistance from family members for ADLs and transfers. Pt's family present and translating throughout session and assisting with mobility. Pt requires A of 2 for bed mobility and to stand for safety and to A with moving R LE d/t pain. Pt fearful of increasing pain and bearing weight on R LE. However once seated on EOB pt did stand for 4 minutes with RW and assistance from pt's son. Pt with loose BM in standing. Stood for cleaning at EOB.  Practiced weight shifting in preparation for walking however pt buckling through RLE and loosing her balance when shifting to the R. Returned to supine and scooted up in bed with Mod A. Educated family on exercises to perform in bed. Will plan to transfer to chair this afternoon. Recommending SNF at discharge to progress mobility. Patient will benefit from skilled intervention to address the above impairments. Patients rehabilitation potential is considered to be Fair  Factors which may influence rehabilitation potential include:   [ ]         None noted  [ ]         Mental ability/status  [X]         Medical condition  [ ]         Home/family situation and support systems  [ ]         Safety awareness  [ ]         Pain tolerance/management  [ ]         Other:        PLAN :  Recommendations and Planned Interventions:  [ ]           Bed Mobility Training             [ ]    Neuromuscular Re-Education  [ ]           Transfer Training                   [ ]    Orthotic/Prosthetic Training  [ ]           Gait Training                         [ ]    Modalities  [ ]           Therapeutic Exercises           [ ]    Edema Management/Control  [ ]           Therapeutic Activities            [ ]    Patient and Family Training/Education  [ ]           Other (comment):     Frequency/Duration: Patient will be followed by physical therapy  twice daily to address goals. Discharge Recommendations: [facility]  Further Equipment Recommendations for Discharge: TBD       SUBJECTIVE:   Patient stated Pt performing UEs in bed.  The family reports she enjoys doing her exercises      OBJECTIVE DATA SUMMARY:   HISTORY:    Past Medical History:   Diagnosis Date    Atrioventricular block, complete ([address] 2/15/2011    GERD (gastroesophageal reflux disease) 2/15/2011    HTN (hypertension) 2/15/2011    Hypercholesteremia      Hypothyroid 2/15/2011    Murmur      Osteoporosis      Other and unspecified hyperlipidemia 2/15/2011    Pacemaker      Syncope and collapse 2/15/2011    Thyroid disease       Past Surgical History:   Procedure Laterality Date    HX GYN         hyst    HX HEENT         corneal tx    HX PACEMAKER        INS PPM/ICD LED DUAL ONLY   9/4/2014           Prior Level of Function/Home Situation: Min A to [address] for mobility with RW. Pt independent with using the bathroom however requires A for bathing, dressing and other self-care  Personal factors and/or comorbidities impacting plan of care: age     Home Situation  Home Environment: Private residence (lives with son)  # Steps to Enter: 0  One/Two Story Residence: One story  Living Alone: No  Support Systems: Family member(s)  Patient Expects to be Discharged to[de-identified] Rehabilitation facility  Current DME Used/Available at Home: Walker, rolling     EXAMINATION/PRESENTATION/DECISION MAKING:   Critical Behavior:  Neurologic State: Alert  Orientation Level: Oriented to person  Cognition: Follows commands     Hearing: Auditory  Auditory Impairment: Hard of hearing, bilateral  Hearing Aids/Status: Bilateral (pt.does not use)     Range Of Motion:  AROM: Generally decreased, functional                       Strength:    Strength: Generally decreased, functional                    Tone & Sensation:   Tone: Normal              Sensation: Intact               Coordination:  Coordination: Generally decreased, functional  Vision:      Functional Mobility:  Bed Mobility:     Supine to Sit: Moderate assistance;Assist x2  Sit to Supine: Minimum assistance;Assist x2  Scooting: Moderate assistance;Assist x2  Transfers:  Sit to Stand: Minimum assistance;Assist x2; Additional time  Stand to Sit: Minimum assistance;Assist x2; Additional time                       Balance:   Sitting: Impaired; Without support  Sitting - Static: Fair (occasional)  Sitting - Dynamic: Fair (occasional)  Standing: Impaired  Standing - Static: Fair;Constant support (RW)  Standing - Dynamic : Poor  Ambulation/Gait Training:                    Right Side Weight Bearing: As tolerated                             Functional Measure:  Timed up and go:      Timed Get Up And Go Test:  (unable to perfrom at this date)      Timed Up and Go and G-code impairment scale:  Percentage of Impairment CH     0%    CI     1-19% CJ     20-39% CK     40-59% CL     60-79% CM     80-99% CN      100%   Timed   Score 0-56 10 11-12 13-14 15-16 17-18 19 20          < than 10 seconds=Normal  Greater then 13.5 seconds (in elderly)=Increased fall risk   [facility] M. Predicting the probability for falls in community dwelling older adults using the Timed Up and Go Test. Phys Ther. 2000;80:896-903. G codes: In compliance with CMSs Claims Based Outcome Reporting, the following G-code set was chosen for this patient based on their primary functional limitation being treated: The outcome measure chosen to determine the severity of the functional limitation was the TUG with a score of 0 which was correlated with the impairment scale. · Mobility - Walking and Moving Around:               - CURRENT STATUS:    CN - 100% impaired, limited or restricted               - GOAL STATUS:           CK - 40%-59% impaired, limited or restricted               - D/C STATUS:                       ---------------To be determined---------------      Physical Therapy Evaluation Charge Determination   History Examination Presentation Decision-Making   MEDIUM  Complexity : 1-2 comorbidities / personal factors will impact the outcome/ POC  MEDIUM Complexity : 3 Standardized tests and measures addressing body structure, function, activity limitation and / or participation in recreation  LOW Complexity : Stable, uncomplicated  LOW Complexity : FOTO score of       Based on the above components, the patient evaluation is determined to be of the following complexity level: LOW      Pain:                    Activity Tolerance:   Fair  Please refer to the flowsheet for vital signs taken during this treatment.   After treatment:   [ ]         Patient left in no apparent distress sitting up in chair  [X]         Patient left in no apparent distress in bed  [X]         Call bell left within reach  [X]         Nursing notified  [ ]         Caregiver present  [ ]         Bed alarm activated      COMMUNICATION/EDUCATION:   The patients plan of care was discussed with: Registered Nurse.  [X]         Fall prevention education was provided and the patient/caregiver indicated understanding. [X]         Patient/family have participated as able in goal setting and plan of care. [X]         Patient/family agree to work toward stated goals and plan of care. [ ]         Patient understands intent and goals of therapy, but is neutral about his/her participation. [ ]         Patient is unable to participate in goal setting and plan of care.      Thank you for this referral.  Mj Abdul, PT   Time Calculation: 25 mins

## 2017-08-03 NOTE — PROGRESS NOTES
Nutrition Assessment:    RECOMMENDATIONS/INTERVENTION(S):   Adjust / D/C D5 IVF    Continue Regular diet - pt's family ordering all meals per pt's tolerance    Continue Ensure Compact BID (chocolate) - snacks    Monitor PO intake & tolerance, BG, lytes, wt  ASSESSMENT:   8/3:  Follow-up. POD #2 hemiarthroplasty R hip. Regular diet started w/ fair PO per nursing. Labs/meds reviewed. Lytes requiring repletion. BG elevated (surgery?). D5 @ 50 ml/hr IVF. Visited pt this morning - sleeping. Son & daughter-in-law at bedside. Family is ordering all meals per pt tolerance (no need to resume [address]). Good PO breakfast meal, ~75% per family & pt is drinking Ensure Compact as snacks. Encouraged continued small, frequent meals & ONS once at rehab.    7/31:  Consult received for general nutrition management & supplements. 80 yof admitted for R hip fx. Pmhx includes HTN, GERD, hypothyroidism, pacemaker. Underwt BMI per advanced age - hx of low BMI per wt hx records. S/p R femoral head resection. Surgical incision to R hip, no PI or edema noted. Visited pt this morning, daughter at bedside. Reports fair appetite currently and PTA, states need for soft diet - minced meats. Pt liked Ensure in the past w/ pacemaker surgery - agreeable to ONS while admitted. RD to adjust diet texture, start ONS and add pt preferences (ie boiled egg at all meals). Labs/meds reviewed. K requiring repletion. ; A1C 6.5. SUBJECTIVE/OBJECTIVE:     Diet Order: Regular (Dental Soft - meals as tolerated ordered by family); Ensure Compact BID  % Eaten:  No data found.     Pertinent Medications: [x] Reviewed - os-vinod, miralax, pericolace    Chemistries:  Lab Results   Component Value Date/Time    Sodium 131 08/03/2017 12:46 AM    Potassium 4.4 08/03/2017 12:46 AM    Chloride 98 08/03/2017 12:46 AM    CO2 26 08/03/2017 12:46 AM    Anion gap 7 08/03/2017 12:46 AM    Glucose 214 08/03/2017 12:46 AM    BUN 13 08/03/2017 12:46 AM Creatinine 0.61 08/03/2017 12:46 AM    BUN/Creatinine ratio 21 08/03/2017 12:46 AM    GFR est AA >60 08/03/2017 12:46 AM    GFR est non-AA >60 08/03/2017 12:46 AM    Calcium 8.6 08/03/2017 12:46 AM    AST (SGOT) 15 06/13/2016 01:55 PM    Alk. phosphatase 78 06/13/2016 01:55 PM    Protein, total 6.4 06/13/2016 01:55 PM    Albumin 4.2 06/13/2016 01:55 PM    Globulin 3.4 09/06/2014 04:50 AM    A-G Ratio 1.9 06/13/2016 01:55 PM    ALT (SGPT) 8 06/13/2016 01:55 PM      Anthropometrics: Height: 4' 10\" (147.3 cm) Weight: 44.5 kg (98 lb)    IBW (%IBW): 47.5 kg (104 lb 11.5 oz) (95.16 %) UBW (%UBW): 47.6 kg (105 lb) (94.9 %)    BMI: Body mass index is 20.48 kg/(m^2). This BMI is indicative of:   [x] Underweight - per advanced age   [] Normal    [] Overweight    []  Obesity    []  Extreme Obesity (BMI>40)  Estimated Nutrition Needs (Based on): 1234 Kcals/day (-1484 (RMR (757) x 1.3 AF + 250-500)) , 58 g (-67 (1.3-1.5 g/kg x actual BW)) Protein  Carbohydrate: At Least 130 g/day  Fluids: 4535-1064 mL/day    Last BM: 8/3 (soft), abd WDL   [x]Active     []Hyperactive  []Hypoactive       [] Absent   BS  Skin:    [] Intact   [x] Incision - R hip  [] Breakdown   [] DTI   [] Tears/Excoriation/Abrasion  []Edema [] Other:      Wt Readings from Last 30 Encounters:   08/01/17 44.5 kg (98 lb)   06/14/17 42.3 kg (93 lb 4 oz)   04/18/17 44 kg (97 lb)   06/13/16 47.6 kg (105 lb)   04/27/16 47.6 kg (105 lb)   04/26/16 47.6 kg (105 lb)   01/20/15 41 kg (90 lb 6.4 oz)   12/03/14 39.5 kg (87 lb)   09/17/14 37.6 kg (83 lb)   09/16/14 40.4 kg (89 lb)   09/06/14 41 kg (90 lb 4.8 oz)   08/28/14 41.6 kg (91 lb 12.8 oz)   04/15/14 42.5 kg (93 lb 12.8 oz)   06/25/13 40.8 kg (90 lb)   05/28/13 41.4 kg (91 lb 3.2 oz)   04/30/13 42 kg (92 lb 9.6 oz)   03/05/13 44 kg (97 lb)   09/21/12 43.1 kg (95 lb)   04/03/12 43.5 kg (95 lb 12.8 oz)   02/27/12 43.7 kg (96 lb 6.4 oz)   01/16/12 44.5 kg (98 lb)   01/09/12 44.5 kg (98 lb)   09/26/11 43.1 kg (95 lb) 08/30/11 43.1 kg (95 lb)   08/01/11 44.9 kg (99 lb)   06/15/11 44 kg (97 lb)      NUTRITION DIAGNOSES:   Problem:  Inadequate protein-energy intake     Etiology: related to missing teeth, post-op     Signs/Symptoms: as evidenced by need for soft solids, inadequate intake as compared to increased needs  chronic low BMI per age    NUTRITION INTERVENTIONS:  Meals/Snacks: General/healthful diet, Modify diet/texture/consistency/nutrients   Supplements: Commercial supplement Feeding Assistance: Meal setup, Feeding assistance at meals            GOAL:   Continue to consume/tolerate >/= 50% of meals & ONS w/ stable lytes in the next 3-5 days    Cultural, Bahai, or Ethnic Dietary Needs: Other (comment) - prefers Asian food per family    LEARNING NEEDS (Diet, Food/Nutrient-Drug Interaction):    [x] None Identified   [] Identified and Education Provided/Documented   [] Identified and Pt declined/was not appropriate      [x] Interdisciplinary Care Plan Reviewed/Documented    [x] Discharge Needs:   See D/C note   [] No Nutrition Related Discharge Needs    NUTRITION RISK:   Pt Is At Nutrition Risk  [x]     No Nutrition Risk Identified  []       PT SEEN FOR:    []  MD Consult: []Calorie Count      []Diabetic Diet Education        []Diet Education     []Electrolyte Management     []General Nutrition Management and Supplements     []Management of Tube Feeding     []TPN Recommendations    []  RN Referral:  []MST score >=2     []Enteral/Parenteral Nutrition PTA     []Pregnant: Gestational DM or Multigestation                 [] Pressure Ulcer    []  Low BMI      []  Length of Stay       [] Dysphagia Diet         [] Ventilator  [x]  Follow-up     Previous Recommendations:   [x] Implemented          [] Not Implemented          [] Not Applicable    Previous Goal:   [] Met              [x] Progressing Towards Goal              [] Not Progressing Towards Goal   [] Not Applicable            Judith Solis, [address] [place] [address]   Pager [phone]

## 2017-08-03 NOTE — ROUTINE PROCESS
Primary Nurse Donato Hollis RN and Ezequiel Sanchez RN performed a dual skin assessment on this patient No impairment noted aside from surgical incision. Hip abductor pillow and heel boots checked and in place.   Martin score is 14

## 2017-08-03 NOTE — ANESTHESIA POSTPROCEDURE EVALUATION
Post-Anesthesia Evaluation and Assessment    Patient: Isabel Trinidad MRN: [id number]  SSN: [id number]    YOB: 1923  Age: 80 y.o. Sex: female       Cardiovascular Function/Vital Signs  Visit Vitals    /51    Pulse 68    Temp 36.4 °C (97.6 °F)    Resp 24    Ht 4' 10\" (1.473 m)    Wt 44.5 kg (98 lb)    SpO2 100%    Breastfeeding No    BMI 20.48 kg/m2       Patient is status post general anesthesia for Procedure(s):  HIP HEMIARTHROPLASTY RIGHT. Nausea/Vomiting: None    Postoperative hydration reviewed and adequate. Pain:  Pain Scale 1: Numeric (0 - 10) (08/02/17 1955)  Pain Intensity 1: 0 (08/02/17 1955)   Managed    Neurological Status:   Neuro (WDL): Exceptions to WDL (08/02/17 1955)  Neuro  Neurologic State: Drowsy (08/02/17 1922)  Orientation Level: Oriented to person (08/02/17 0820)  Cognition: Follows commands (08/02/17 0820)  Speech: Clear;Language barrier (08/02/17 0820)  LUE Motor Response: Purposeful (08/02/17 0820)  LLE Motor Response: Purposeful;Weak (08/02/17 0820)  RUE Motor Response: Purposeful (08/02/17 0820)  RLE Motor Response: Weak (08/02/17 1955)   At baseline    Mental Status and Level of Consciousness: Arousable    Pulmonary Status:   O2 Device: Nasal cannula (08/02/17 1955)   Adequate oxygenation and airway patent    Complications related to anesthesia: None    Post-anesthesia assessment completed.  No concerns    Signed By: Jaskaran Goldberg MD     August 2, 2017

## 2017-08-03 NOTE — PERIOP NOTES
TRANSFER - OUT REPORT:    Verbal report given to [facility], [place], operated by [facility]) on Jonette Fothergill  being transferred to [facility](unit) for routine post - op       Report consisted of patients Situation, Background, Assessment and   Recommendations(SBAR). Information from the following report(s) SBAR, Kardex and MAR was reviewed with the receiving nurse. Lines:   Peripheral IV 08/01/17 Right Antecubital (Active)   Site Assessment Clean, dry, & intact 8/2/2017  3:31 PM   Phlebitis Assessment 0 8/2/2017  3:31 PM   Infiltration Assessment 0 8/2/2017  3:31 PM   Dressing Status Clean, dry, & intact 8/2/2017  3:31 PM   Dressing Type Transparent 8/2/2017  3:31 PM   Hub Color/Line Status Pink 8/2/2017  3:31 PM   Alcohol Cap Used Yes 8/2/2017  3:31 PM       Peripheral IV Right Forearm (Active)        Opportunity for questions and clarification was provided.       Patient transported with:   O2 @ 2 liters  Registered Nurse

## 2017-08-03 NOTE — PROGRESS NOTES
Problem: Mobility Impaired (Adult and Pediatric)  Goal: *Acute Goals and Plan of Care (Insert Text)  Physical Therapy Goals  Initiated 8/3/2017    1. Patient will move from supine to sit and sit to supine in bed with minimal assistance/contact guard assist within 4 days. 2. Patient will perform sit to stand with minimal assistance/contact guard assist within 4 days. 3. Patient will ambulate with minimal assistance/contact guard assist for 10 feet with the least restrictive device within 4 days. 4. Patient will verbalize and demonstrate understanding of hip precautions per protocol within 4 days. 5. Patient will perform hip home exercise program per protocol with minimal assistance/contact guard assist within 4 days. PHYSICAL THERAPY TREATMENT  Patient: Nicole Sorensen (80 y.o. female)  Date: 8/3/2017  Diagnosis: Hip fracture, right ([address]  RIGHT HIP FRACTURE  RIGHT HIP FRACTURE Hip fracture, right (HCC)  Procedure(s) (LRB):  HIP HEMIARTHROPLASTY RIGHT (Right) 1 Day Post-Op  Precautions: Other (comment) (posterior hip precautions)      ASSESSMENT:  Pt seen this afternoon with OT present for energy conservation. Overall pt moving slower and requiring more assistance than this morning. Mod-Max A x 2 vs (Min-Mod x 2 this morning). Pt grimacing more this afternoon but daughter report her mother had any increased pain. Pt stood with RW and forward flexed posture. Able to maintain upright posture ~3 seconds before returning to flexed posture. Took small shuffling steps with Mod-Max A x 2 for weight shifting and advancing LEs. Pt began with a small REYES but progressed to widened and had difficulty picking feet up off of ground. (shuffling steps with therapy positioning feet and weight shifting). Transferred to chair and pt performed UE/LE exercises independently. Left in chair with alarm, feet up and family in room. Pt will need SNF at discharge. Informed nursing to stand pivot back to bed A x 2.   Progression toward goals:  [ ]      Improving appropriately and progressing toward goals  [X]      Improving slowly and progressing toward goals  [ ]      Not making progress toward goals and plan of care will be adjusted       PLAN:  Patient continues to benefit from skilled intervention to address the above impairments. Continue treatment per established plan of care. Discharge Recommendations:  Skilled Nursing Facility  Further Equipment Recommendations for Discharge:  TBD       SUBJECTIVE:   Patient stated She used to out walk us all.  report from daughter      OBJECTIVE DATA SUMMARY:   Functional Mobility Training:  Bed Mobility:     Supine to Sit: Assist x2;Maximum assistance; Additional time  Sit to Supine: Minimum assistance;Assist x2  Scooting: Assist x2; Additional time; Moderate assistance        Transfers:  Sit to Stand: Assist x2; Additional time; Moderate assistance  Stand to Sit: Minimum assistance;Assist x2; Additional time        Bed to Chair: Maximum assistance;Assist x2; Additional time (weight shifting, cues to advance foot, balance )                    Ambulation/Gait Training:  Distance (ft): 3 Feet (ft)  Assistive Device: Gait belt;Walker, rolling  Ambulation - Level of Assistance: Moderate assistance;Assist x2; Additional time (pts daughter also assisting with RW)        Gait Abnormalities: Decreased step clearance; Antalgic; Shuffling gait;Trunk sway increased  Right Side Weight Bearing: As tolerated     Base of Support: Widened     Speed/Mariella: Pace decreased (<100 feet/min); Shuffled;Slow;Delayed  Step Length: Right shortened;Left shortened                  Activity Tolerance:   Fair  Please refer to the flowsheet for vital signs taken during this treatment.   After treatment:   [X] Patient left in no apparent distress sitting up in chair  [ ] Patient left in no apparent distress in bed  [X] Call bell left within reach  [X] Nursing notified  [ ] Caregiver present  [ ] Bed alarm activated      COMMUNICATION/COLLABORATION: The patients plan of care was discussed with: Occupational Therapist and Registered Nurse     Ilan Morales, PT   Time Calculation: 28 mins

## 2017-08-03 NOTE — PROGRESS NOTES
Problem: Self Care Deficits Care Plan (Adult)  Goal: *Acute Goals and Plan of Care (Insert Text)  Occupational Therapy Goals  Initiated 8/3/2017     1. Patient will perform lower body dressing with moderate assistance within 7 days. 2. Patient will perform toilet transfers at minimal assistance/contact guard assist level within 7 days. 3. Patient will perform all aspects of toileting at minimal assistance/contact guard assist level within 7 days. 4. Patient will demonstrate 3/3 hip precautions during ADL tasks with verbal cues within 7 days. OCCUPATIONAL THERAPY EVALUATION  Patient: Gareth Rodriguez (80 y.o. female)  Date: 8/3/2017  Primary Diagnosis: Hip fracture, right (HCC)  RIGHT HIP FRACTURE  RIGHT HIP FRACTURE  Procedure(s) (LRB):  HIP HEMIARTHROPLASTY RIGHT (Right) 1 Day Post-Op   Precautions:   Fall, WBAT, Skin      ASSESSMENT :  Based on the objective data described below, the patient presents with total assistance to minimum assistance for ADL tasks. ADLs are limited by hip precautions, R hip pain, decreased functional mobility, decreased standing/sitting balance, weakness, and increased pain. Per family, patient was able to walk short distances with RW and has assist of family members for ADL tasks and transfers. Patient family active in care and assist as needed. Patient requires max assist x 2 for supine to sit, and noted with decreased sitting balance. Upon standing, patient noted small bowel smear to pad on bed. Family member performs hygiene for patient while standing with therapists. Patient requires assist x 2 for sequencing, weight shifting, and balance but  able to transfer to chair with mod assist x 2. Once in chair, patient demonstrating UE exercise and encouraged to continue. Patient will require SNF placement at discharge. Patient will benefit from skilled intervention to address the above impairments.   Patients rehabilitation potential is considered to be Good  Factors which may influence rehabilitation potential include:   [ ]             None noted  [ ]             Mental ability/status  [X]             Medical condition  [ ]             Home/family situation and support systems  [ ]             Safety awareness  [ ]             Pain tolerance/management  [ ]             Other:        PLAN :  Recommendations and Planned Interventions:  [X]               Self Care Training                  [X]        Therapeutic Activities  [X]               Functional Mobility Training    [ ]        Cognitive Retraining  [X]               Therapeutic Exercises           [X]        Endurance Activities  [X]               Balance Training                   [X]        Neuromuscular Re-Education  [ ]               Visual/Perceptual Training     [ ]   Home Safety Training  [X]               Patient Education                 [X]        Family Training/Education  [ ]               Other (comment):     Frequency/Duration: Patient will be followed by occupational therapy 3 times a week to address goals.   Discharge Recommendations: Juancho Reynaga  Further Equipment Recommendations for Discharge: TBD       SUBJECTIVE:   Patient stated .      OBJECTIVE DATA SUMMARY:   HISTORY:   Past Medical History:   Diagnosis Date    Atrioventricular block, complete ([facility] [address] 2/15/2011    GERD (gastroesophageal reflux disease) 2/15/2011    HTN (hypertension) 2/15/2011    Hypercholesteremia      Hypothyroid 2/15/2011    Murmur      Osteoporosis      Other and unspecified hyperlipidemia 2/15/2011    Pacemaker      Syncope and collapse 2/15/2011    Thyroid disease       Past Surgical History:   Procedure Laterality Date    HX GYN         hyst    HX HEENT         corneal tx    HX PACEMAKER        INS PPM/ICD LED DUAL ONLY   9/4/2014              Prior Level of Function/Home Situation: Patient with assist for ADL tasks  Expanded or extensive additional review of patient history:      [facility] Environment: Private residence (lives with son)  # Steps to Enter: 0  One/Two Story Residence: One story  Living Alone: No  Support Systems: Family member(s)  Patient Expects to be Discharged to[de-identified] Rehabilitation facility  Current DME Used/Available at Home: Walker, rolling  Tub or Shower Type: Tub/Shower combination  [X]  Right hand dominant             [ ]  Left hand dominant     EXAMINATION OF PERFORMANCE DEFICITS:  Cognitive/Behavioral Status:  Neurologic State: Alert  Orientation Level: Oriented to person  Cognition: Follows commands  Perception: Appears intact  Perseveration: No perseveration noted  Safety/Judgement: Awareness of environment     Skin: intact as seen     Edema: RLE     Hearing: Auditory  Auditory Impairment: Hard of hearing, bilateral  Hearing Aids/Status: Bilateral (pt.does not use)     Vision/Perceptual:               Range of Motion:  AROM: Within functional limits     Strength:  Strength: Generally decreased, functional     Coordination:  Coordination: Generally decreased, functional  Fine Motor Skills-Upper: Left Intact; Right Intact    Gross Motor Skills-Upper: Left Intact; Right Intact     Tone & Sensation:  Tone: Normal  Sensation: Intact        Balance:  Sitting: Impaired  Sitting - Static: Fair (occasional)  Sitting - Dynamic: Fair (occasional)  Standing: Impaired  Standing - Static: Constant support; Fair  Standing - Dynamic : Poor     Functional Mobility and Transfers for ADLs:  Bed Mobility:  Supine to Sit: Assist x2;Maximum assistance; Additional time  Sit to Supine: Minimum assistance;Assist x2  Scooting: Assist x2; Additional time; Moderate assistance     Transfers:  Sit to Stand: Assist x2; Additional time; Moderate assistance  Stand to Sit: Minimum assistance;Assist x2; Additional time  Bed to Chair: Maximum assistance;Assist x2; Additional time (weight shifting, cues to advance foot, balance )  Toilet Transfer : Maximum assistance;Assist x2; Additional time [facility])     ADL Assessment:  Feeding: Minimum assistance     Oral Facial Hygiene/Grooming: Minimum assistance     Bathing: Maximum assistance     Upper Body Dressing: Moderate assistance     Lower Body Dressing: Total assistance     Toileting: Total assistance                 ADL Intervention and task modifications:     Cognitive Retraining  Safety/Judgement: Awareness of environment     Therapeutic Exercise:  Functional Measure:  Barthel Index:      Bathin  Bladder: 0  Bowels: 0  Groomin  Dressin  Feedin  Mobility: 0  Stairs: 0  Toilet Use: 0  Transfer (Bed to Chair and Back): 5  Total: 10         Barthel and G-code impairment scale:  Percentage of impairment CH  0% CI  1-19% CJ  20-39% CK  40-59% CL  60-79% CM  80-99% CN  100%   Barthel Score 0-100 100 99-80 79-60 59-40 20-39 1-19    0   Barthel Score 0-20 20 17-19 13-16 9-12 5-8 1-4 0      The Barthel ADL Index: Guidelines  1. The index should be used as a record of what a patient does, not as a record of what a patient could do. 2. The main aim is to establish degree of independence from any help, physical or verbal, however minor and for whatever reason. 3. The need for supervision renders the patient not independent. 4. A patient's performance should be established using the best available evidence. Asking the patient, friends/relatives and nurses are the usual sources, but direct observation and common sense are also important. However direct testing is not needed. 5. Usually the patient's performance over the preceding 24-48 hours is important, but occasionally longer periods will be relevant. 6. Middle categories imply that the patient supplies over 50 per cent of the effort. 7. Use of aids to be independent is allowed. Yandel Avilez., Barthel, D.W. (5601). Functional evaluation: the Barthel Index. [address] [facility] (14)2. Gaile Rubinstein radha Ly, DEANNJRIVER, Yoseph Duque., Zina Salazar., Lin, [address] [place] [address] ().  Measuring the change indisability after inpatient rehabilitation; comparison of the responsiveness of the Barthel Index and Functional [place] Measure. Journal of Neurology, Neurosurgery, and Psychiatry, 66(4), 219-263. SHEA Morales, KAREL Valladares, & Carlton Banks M.A. (2004.) Assessment of post-stroke quality of life in cost-effectiveness studies: The usefulness of the Barthel Index and the EuroQoL-5D. Quality of Life Research, 13, 005-15            G codes: In compliance with CMSs Claims Based Outcome Reporting, the following G-code set was chosen for this patient based on their primary functional limitation being treated: The outcome measure chosen to determine the severity of the functional limitation was the Barthel INDex with a score of 10/100 which was correlated with the impairment scale. · Self Care:               - CURRENT STATUS:    CM - 80%-99% impaired, limited or restricted               - GOAL STATUS:           CL - 60%-79% impaired, limited or restricted               - D/C STATUS:                       ---------------To be determined---------------      Occupational Therapy Evaluation Charge Determination   History Examination Decision-Making   LOW Complexity : Brief history review  LOW Complexity : 1-3 performance deficits relating to physical, cognitive , or psychosocial skils that result in activity limitations and / or participation restrictions  LOW Complexity : No comorbidities that affect functional and no verbal or physical assistance needed to complete eval tasks       Based on the above components, the patient evaluation is determined to be of the following complexity level: LOW   Pain:                    Activity Tolerance:   VSS  Please refer to the flowsheet for vital signs taken during this treatment.   After treatment:   [X] Patient left in no apparent distress sitting up in chair  [ ] Patient left in no apparent distress in bed  [X] Call bell left within reach  [X] Nursing notified  [X] Caregiver present  [ ] Bed alarm activated      COMMUNICATION/EDUCATION:   The patients plan of care was discussed with: Physical Therapist and Registered Nurse.  [X] Home safety education was provided and the patient/caregiver indicated understanding. [X] Patient/family have participated as able in goal setting and plan of care. [X] Patient/family agree to work toward stated goals and plan of care. [ ] Patient understands intent and goals of therapy, but is neutral about his/her participation. [ ] Patient is unable to participate in goal setting and plan of care. This patients plan of care is appropriate for delegation to [facility].      Thank you for this referral.  Miles Davis, OTR/L  Time Calculation: 33 mins

## 2017-08-03 NOTE — MED STUDENT NOTES
*ATTENTION:  This note has been created by a medical student for educational purposes only. Please do not refer to the content of this note for clinical decision-making, billing, or other purposes. Please see attending physicians note to obtain clinical information on this patient. *     Pt info: Ms. Gaviota Kovacs is a 81 yo female with hx of HTN, 3rd degree heart block s/p pacer, T2DM, and hypothyroidism admitted for R hip fracture. Subjective:  Underwent R hip arthroplasty with ortho yesterday with no complications. No purulence was seen during the procedure. No acute events overnight. She only required tylenol overnight for pain. No pain or other complaints other than hunger this morning. Objective:  Patient Vitals for the past 12 hrs:   Temp Pulse Resp BP SpO2   08/03/17 1122 96.4 °F (35.8 °C) 68 16 121/66 99 %   08/03/17 0740 97.4 °F (36.3 °C) 70 16 119/71 97 %   08/03/17 0318 97.7 °F (36.5 °C) 71 16 109/63 97 %     Physical Exam:  General: in no acute distress  CV: RRR, normal peripheral perfusion  Lungs: CTAB, no increased work of breathing  Ext: R hip splinted s/p surgery    Labs:      CBC WITH AUTOMATED DIFF    Collection Time: 08/03/17 12:46 AM   Result Value Ref Range    WBC 12.6 (H) 3.6 - 11.0 K/uL    RBC 3.57 (L) 3.80 - 5.20 M/uL    HGB 10.4 (L) 11.5 - 16.0 g/dL    HCT 31.8 (L) 35.0 - 47.0 %    MCV 89.1 80.0 - 99.0 FL    MCH 29.1 26.0 - 34.0 PG    MCHC 32.7 30.0 - 36.5 g/dL    RDW 14.4 11.5 - 14.5 %    PLATELET 299 835 - 919 K/uL    NEUTROPHILS 91 (H) 32 - 75 %    LYMPHOCYTES 7 (L) 12 - 49 %    MONOCYTES 2 (L) 5 - 13 %    EOSINOPHILS 0 0 - 7 %    BASOPHILS 0 0 - 1 %    ABS. NEUTROPHILS 11.4 (H) 1.8 - 8.0 K/UL    ABS. LYMPHOCYTES 0.9 0.8 - 3.5 K/UL    ABS. MONOCYTES 0.3 0.0 - 1.0 K/UL    ABS. EOSINOPHILS 0.0 0.0 - 0.4 K/UL    ABS.  BASOPHILS 0.0 0.0 - 0.1 K/UL    DF SMEAR SCANNED      PLATELET COMMENTS LARGE PLATELETS      RBC COMMENTS ACANTHOCYTES  POIKILOCYTOSIS  1+         BMP 8/3 significant for Na of 131    Microbiology:   Surgical pathology collected on 7/29 report: no signs of osteomyelitis, articular erosions with underlying bone containing extensive marrow hemorrhage and necrosis.     Imaging:  XR Pelvis 8/2: anatomic alignment R hip arthroplasty    Current Facility-Administered Medications   Medication Dose Route Frequency    dextrose 5 % - 0.45% NaCl infusion  50 mL/hr IntraVENous CONTINUOUS    hydrALAZINE (APRESOLINE) 20 mg/mL injection 10 mg  10 mg IntraVENous Q6H PRN    0.9% sodium chloride infusion 250 mL  250 mL IntraVENous PRN    0.9% sodium chloride infusion 250 mL  250 mL IntraVENous PRN    acetaminophen (TYLENOL) tablet 1,000 mg  1,000 mg Oral Q8H    sodium chloride (NS) flush 5-10 mL  5-10 mL IntraVENous Q8H    sodium chloride (NS) flush 5-10 mL  5-10 mL IntraVENous PRN    naloxone (NARCAN) injection 0.4 mg  0.4 mg IntraVENous PRN    calcium-vitamin D (OS-REGINE) 500 mg-200 unit tablet  1 Tab Oral TID WITH MEALS    senna-docusate (PERICOLACE) 8.6-50 mg per tablet 1 Tab  1 Tab Oral BID    polyethylene glycol (MIRALAX) packet 17 g  17 g Oral DAILY    bisacodyl (DULCOLAX) suppository 10 mg  10 mg Rectal DAILY PRN    oxyCODONE IR (ROXICODONE) tablet 2.5 mg  2.5 mg Oral Q4H PRN    oxyCODONE IR (ROXICODONE) tablet 5 mg  5 mg Oral Q4H PRN    enoxaparin (LOVENOX) injection 30 mg  30 mg SubCUTAneous DAILY    amLODIPine (NORVASC) tablet 10 mg  10 mg Oral DAILY    brimonidine (ALPHAGAN P) 0.1 % ophthalmic solution 1 Drop  1 Drop Both Eyes BID    dorzolamide-timolol (COSOPT) 22.3-6.8 mg/mL ophthalmic solution 1 Drop  1 Drop Both Eyes BID    labetalol (NORMODYNE) tablet 100 mg  100 mg Oral BID    latanoprost (XALATAN) 0.005 % ophthalmic solution 1 Drop  1 Drop Both Eyes QHS    levothyroxine (SYNTHROID) tablet 50 mcg  50 mcg Oral ACB    glucose chewable tablet 16 g  4 Tab Oral PRN    dextrose (D50W) injection syrg 12.5-25 g  12.5-25 g IntraVENous PRN    glucagon (GLUCAGEN) injection 1 mg  1 mg IntraMUSCular PRN     Assessment/Plan:  Ms. Shruthi Gomez is a 81 yo female with hx of HTN, 3rd degree heart block, hypothyroidism, and T2DM who was admitted for a R hip fracture after fall. Hip fracture: POD5 s/p R femoral head resection, POD1 s/p R hip arthroplasty, fracture 2/2 osteoporosis or previous infection causing necrosis of femoral head.    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  -Continue home dorzolamide-timolol, brimonidine, latanoprost    Hypothyroidism, stable  -Continue home synthroid 50 mcg daily    HTN, stable  -Continue home labetalol 100 mg BID and norvasc 10 mg  -IV hydralazine 10 mg prn for SBP >160 or DBP >100    3rd degree heart block, stable, s/p pacer    T2DM, stable with last A1C 6.5% July 2017  -Continue healthy diet, not managed on medications    Hyponatremia, stable  -Continue to monitor with daily BMP  -Will collect urine studies including urine sodium, urine creatinine    F: D51/2NS 50 cc/hr  E: replete prn  N: regular diet  DVT ppx: lovenox 40 mg sq daily  Code: DNR  Dispo: working with case management, plan to d/c to [facility] Friday 8/4 AM [facility])    Allie Pereira, [address]

## 2017-08-04 VITALS
HEIGHT: 58 IN | OXYGEN SATURATION: 97 % | HEART RATE: 67 BPM | WEIGHT: 99.2 LBS | BODY MASS INDEX: 20.82 KG/M2 | TEMPERATURE: 98.4 F | DIASTOLIC BLOOD PRESSURE: 65 MMHG | SYSTOLIC BLOOD PRESSURE: 114 MMHG | RESPIRATION RATE: 16 BRPM

## 2017-08-04 DIAGNOSIS — E03.9 ACQUIRED HYPOTHYROIDISM: ICD-10-CM

## 2017-08-04 LAB
ANION GAP BLD CALC-SCNC: 4 MMOL/L (ref 5–15)
ATRIAL RATE: 78 BPM
BACTERIA SPEC CULT: NORMAL
BASOPHILS # BLD AUTO: 0 K/UL (ref 0–0.1)
BASOPHILS # BLD: 0 % (ref 0–1)
BUN SERPL-MCNC: 18 MG/DL (ref 6–20)
BUN/CREAT SERPL: 32 (ref 12–20)
CALCIUM SERPL-MCNC: 8.8 MG/DL (ref 8.5–10.1)
CALCULATED P AXIS, ECG09: -12 DEGREES
CALCULATED R AXIS, ECG10: 52 DEGREES
CALCULATED T AXIS, ECG11: 100 DEGREES
CHLORIDE SERPL-SCNC: 99 MMOL/L (ref 97–108)
CO2 SERPL-SCNC: 29 MMOL/L (ref 21–32)
CREAT SERPL-MCNC: 0.56 MG/DL (ref 0.55–1.02)
CREAT UR-MCNC: 25.86 MG/DL
DIAGNOSIS, 93000: NORMAL
DIFFERENTIAL METHOD BLD: ABNORMAL
EOSINOPHIL # BLD: 0.2 K/UL (ref 0–0.4)
EOSINOPHIL NFR BLD: 2 % (ref 0–7)
ERYTHROCYTE [DISTWIDTH] IN BLOOD BY AUTOMATED COUNT: 14.5 % (ref 11.5–14.5)
GLUCOSE SERPL-MCNC: 124 MG/DL (ref 65–100)
HCT VFR BLD AUTO: 27.1 % (ref 35–47)
HGB BLD-MCNC: 9.2 G/DL (ref 11.5–16)
LYMPHOCYTES # BLD AUTO: 21 % (ref 12–49)
LYMPHOCYTES # BLD: 1.7 K/UL (ref 0.8–3.5)
MAGNESIUM SERPL-MCNC: 1.8 MG/DL (ref 1.6–2.4)
MCH RBC QN AUTO: 30.1 PG (ref 26–34)
MCHC RBC AUTO-ENTMCNC: 33.9 G/DL (ref 30–36.5)
MCV RBC AUTO: 88.6 FL (ref 80–99)
MONOCYTES # BLD: 0.7 K/UL (ref 0–1)
MONOCYTES NFR BLD AUTO: 9 % (ref 5–13)
NEUTS SEG # BLD: 5.6 K/UL (ref 1.8–8)
NEUTS SEG NFR BLD AUTO: 68 % (ref 32–75)
OSMOLALITY UR: 280 MOSM/KG H2O
P-R INTERVAL, ECG05: 172 MS
PHOSPHATE SERPL-MCNC: 3.6 MG/DL (ref 2.6–4.7)
PLATELET # BLD AUTO: 371 K/UL (ref 150–400)
POTASSIUM SERPL-SCNC: 4 MMOL/L (ref 3.5–5.1)
Q-T INTERVAL, ECG07: 448 MS
QRS DURATION, ECG06: 152 MS
QTC CALCULATION (BEZET), ECG08: 510 MS
RBC # BLD AUTO: 3.06 M/UL (ref 3.8–5.2)
RBC MORPH BLD: ABNORMAL
RBC MORPH BLD: ABNORMAL
SERVICE CMNT-IMP: NORMAL
SODIUM SERPL-SCNC: 132 MMOL/L (ref 136–145)
SODIUM UR-SCNC: 29 MMOL/L
VENTRICULAR RATE, ECG03: 78 BPM
WBC # BLD AUTO: 8.2 K/UL (ref 3.6–11)

## 2017-08-04 PROCEDURE — 97530 THERAPEUTIC ACTIVITIES: CPT

## 2017-08-04 PROCEDURE — 82570 ASSAY OF URINE CREATININE: CPT | Performed by: FAMILY MEDICINE

## 2017-08-04 PROCEDURE — 36415 COLL VENOUS BLD VENIPUNCTURE: CPT | Performed by: ORTHOPAEDIC SURGERY

## 2017-08-04 PROCEDURE — 74011250637 HC RX REV CODE- 250/637: Performed by: STUDENT IN AN ORGANIZED HEALTH CARE EDUCATION/TRAINING PROGRAM

## 2017-08-04 PROCEDURE — 85025 COMPLETE CBC W/AUTO DIFF WBC: CPT | Performed by: ORTHOPAEDIC SURGERY

## 2017-08-04 PROCEDURE — 80048 BASIC METABOLIC PNL TOTAL CA: CPT | Performed by: ORTHOPAEDIC SURGERY

## 2017-08-04 PROCEDURE — 97110 THERAPEUTIC EXERCISES: CPT

## 2017-08-04 PROCEDURE — 84100 ASSAY OF PHOSPHORUS: CPT | Performed by: ORTHOPAEDIC SURGERY

## 2017-08-04 PROCEDURE — 74011250636 HC RX REV CODE- 250/636: Performed by: ORTHOPAEDIC SURGERY

## 2017-08-04 PROCEDURE — 84300 ASSAY OF URINE SODIUM: CPT | Performed by: FAMILY MEDICINE

## 2017-08-04 PROCEDURE — 83935 ASSAY OF URINE OSMOLALITY: CPT | Performed by: FAMILY MEDICINE

## 2017-08-04 PROCEDURE — 74011250637 HC RX REV CODE- 250/637: Performed by: ORTHOPAEDIC SURGERY

## 2017-08-04 PROCEDURE — 97116 GAIT TRAINING THERAPY: CPT

## 2017-08-04 PROCEDURE — 83735 ASSAY OF MAGNESIUM: CPT | Performed by: ORTHOPAEDIC SURGERY

## 2017-08-04 RX ORDER — OXYCODONE AND ACETAMINOPHEN 5; 325 MG/1; MG/1
1 TABLET ORAL
Qty: 30 TAB | Refills: 0 | Status: SHIPPED | OUTPATIENT
Start: 2017-08-04

## 2017-08-04 RX ORDER — ENOXAPARIN SODIUM 100 MG/ML
30 INJECTION SUBCUTANEOUS DAILY
Qty: 8.4 ML | Refills: 0 | Status: SHIPPED | OUTPATIENT
Start: 2017-08-04 | End: 2017-09-01

## 2017-08-04 RX ORDER — OXYCODONE HYDROCHLORIDE 5 MG/1
2.5 TABLET ORAL
Qty: 10 TAB | Refills: 0 | Status: SHIPPED | OUTPATIENT
Start: 2017-08-04 | End: 2017-08-04

## 2017-08-04 RX ORDER — ACETAMINOPHEN 500 MG
1000 TABLET ORAL EVERY 8 HOURS
Qty: 15 TAB | Refills: 0 | Status: SHIPPED | OUTPATIENT
Start: 2017-08-04 | End: 2017-08-04

## 2017-08-04 RX ORDER — ACETAMINOPHEN 325 MG/1
650 TABLET ORAL EVERY 8 HOURS
Qty: 84 TAB | Refills: 0 | Status: SHIPPED | OUTPATIENT
Start: 2017-08-04 | End: 2017-08-18

## 2017-08-04 RX ORDER — FAMOTIDINE 10 MG/1
10 TABLET ORAL 2 TIMES DAILY
Qty: 28 TAB | Refills: 0 | Status: SHIPPED | OUTPATIENT
Start: 2017-08-04 | End: 2017-09-28

## 2017-08-04 RX ADMIN — OYSTER SHELL CALCIUM WITH VITAMIN D 1 TABLET: 500; 200 TABLET, FILM COATED ORAL at 08:44

## 2017-08-04 RX ADMIN — LABETALOL HCL 100 MG: 100 TABLET, FILM COATED ORAL at 08:44

## 2017-08-04 RX ADMIN — POLYETHYLENE GLYCOL 3350 17 G: 17 POWDER, FOR SOLUTION ORAL at 08:45

## 2017-08-04 RX ADMIN — OYSTER SHELL CALCIUM WITH VITAMIN D 1 TABLET: 500; 200 TABLET, FILM COATED ORAL at 12:28

## 2017-08-04 RX ADMIN — ENOXAPARIN SODIUM 30 MG: 30 INJECTION SUBCUTANEOUS at 08:43

## 2017-08-04 RX ADMIN — Medication 10 ML: at 06:32

## 2017-08-04 RX ADMIN — LEVOTHYROXINE SODIUM 50 MCG: 0.05 TABLET ORAL at 06:32

## 2017-08-04 RX ADMIN — DORZOLAMIDE HYDROCHLORIDE AND TIMOLOL MALEATE 1 DROP: 20; 5 SOLUTION/ DROPS OPHTHALMIC at 08:45

## 2017-08-04 RX ADMIN — ACETAMINOPHEN 1000 MG: 500 TABLET ORAL at 06:32

## 2017-08-04 RX ADMIN — BRIMONIDINE TARTRATE 1 DROP: 1 SOLUTION/ DROPS OPHTHALMIC at 08:45

## 2017-08-04 RX ADMIN — DOCUSATE SODIUM AND SENNOSIDES 1 TABLET: 50; 8.6 TABLET, FILM COATED ORAL at 08:43

## 2017-08-04 RX ADMIN — ACETAMINOPHEN 1000 MG: 500 TABLET ORAL at 13:31

## 2017-08-04 NOTE — PROGRESS NOTES
8/4/2017 9:25 AM Confirmed with [facility] and Ortho, discharge today. Ear[place]eroz [place] [address] [facility] confirmed they can accept today. Ambulance transport arranged for 2PM. Nursing please call report to [phone] [facility] at [phone]. Spoke with pt's son who is in agreement and understanding of discharge. CM will complete ambulance packet once discharge orders are written. Thanks.  Isidro Julian ,BSW

## 2017-08-04 NOTE — ROUTINE PROCESS
Orthopedic & Surgical Nursing Communication Tool      2:49 AM  8/4/2017     Bedside and Verbal shift change report given to Pina Joaquin RN (incoming nurse) by Juan Jose Quinones RN (outgoing nurse) on Deborah Wallace a 80 y.o. female who was admitted on 7/28/2017  1:28 PM. Report included the following information SBAR, Procedure Summary, Intake/Output and MAR. Opportunity for questions and clarifications were given to the incoming nurse. Patient's bed is in low position, side rails x3, bed alarm on, call bell within reach and patient not in distress. Hourly rounding performed throughout shift.     Juan Jose Quinones RN

## 2017-08-04 NOTE — PROGRESS NOTES
Orthopaedic Progress Note  Post Op day: 2 Days Post-Op    2017 10:04 AM     Patient: Lilian Clayton MRN: [id number]  SSN: [id number]    YOB: 1923  Age: 80 y.o. Sex: female      Admit date:  2017  Date of Surgery:  2017   Procedures:  Procedure(s):  HIP HEMIARTHROPLASTY RIGHT  Admitting Physician:  Chyna Engel MD   Surgeon:  Black Tidwell) and Role:     * Salvador Barnes MD - Primary    Consulting Physician(s): Treatment Team: Attending Provider: Brissa Cartwright MD; Consulting Provider: Justo Kamara NP; Consulting Provider: Manoj Cordova MD; Consulting Provider: Arnold Reynaga MD; Consulting Provider: Salvador Barnes MD; Consulting Provider: Alberto García MD; Consulting Provider: Coleen Gaston MD; Care Manager: Mahi Galeana    SUBJECTIVE:     Lilian Clayton is a 80 y.o. female is 2 Days Post-Op s/p Procedure(s):  HIP HEMIARTHROPLASTY RIGHT with an appropriate level of post-operative pain. No complaints of nausea, vomiting, dizziness, lightheadedness, chest pain, or shortness of breath. OBJECTIVE:       Physical Exam:  General: Alert, cooperative, no distress. Respiratory: Respirations unlabored  Neurological:  Neurovascular exam within normal limits. Motor: + DF/PF. Musculoskeletal: Calves soft, supple, non-tender upon palpation. Dressing/Wound:  Clean, dry and intact. No significant erythema or swelling.       Vital Signs:      Patient Vitals for the past 8 hrs:   BP Temp Pulse Resp SpO2   17 0844 114/65 - 67 - -   17 0407 114/65 98.4 °F (36.9 °C) 67 16 97 %                                          Temp (24hrs), Av.9 °F (36.6 °C), Min:96.4 °F (35.8 °C), Max:98.4 °F (36.9 °C)      Labs:        Recent Labs      17   0100   HCT  27.1*   HGB  9.2*     Lab Results   Component Value Date/Time    Sodium 132 2017 01:00 AM    Potassium 4.0 2017 01:00 AM    Chloride 99 2017 01:00 AM    CO2 29 2017 01:00 AM Glucose 124 08/04/2017 01:00 AM    BUN 18 08/04/2017 01:00 AM    Creatinine 0.56 08/04/2017 01:00 AM    Calcium 8.8 08/04/2017 01:00 AM       PT/OT:        Gait  Base of Support: Widened  Speed/Mariella: Pace decreased (<100 feet/min), Shuffled, Slow, Delayed  Step Length: Right shortened, Left shortened  Gait Abnormalities: Decreased step clearance, Antalgic, Shuffling gait, Trunk sway increased  Ambulation - Level of Assistance: Moderate assistance, Assist x2, Additional time (pts daughter also assisting with RW)  Distance (ft): 3 Feet (ft)  Assistive Device: Gait belt, Walker, rolling       Patient mobility  Bed Mobility Training  Supine to Sit: Assist x2, Maximum assistance, Additional time  Sit to Supine: Minimum assistance, Assist x2  Scooting: Assist x2, Additional time, Moderate assistance  Transfer Training  Sit to Stand: Assist x2, Additional time, Moderate assistance  Stand to Sit: Minimum assistance, Assist x2, Additional time  Bed to Chair: Maximum assistance, Assist x2, Additional time (weight shifting, cues to advance foot, balance )      Gait Training  Assistive Device: Gait belt, Walker, rolling  Ambulation - Level of Assistance: Moderate assistance, Assist x2, Additional time (pts daughter also assisting with RW)  Distance (ft): 3 Feet (ft)   Weight Bearing Status  Right Side Weight Bearing: As tolerated        ASSESSMENT / PLAN:   Principal Problem:    Hip fracture, right ([address] (7/28/2017)    Active Problems:    Hyponatremia (8/3/2017)                    Pain Control: Adequate with oral agents    DVT Prophylaxis: Lovenox daily, SCDs, JAMES Hose    Therapy/ Weight bearing: WBAT   Wound/ incisional issues:    Medical concerns:    Disposition: [facility] of [address] [place] [address] today     Follow up with Dr. Lauryn Victor in 2 weeks. [phone]    Signed By:  [facility] Katiana, PA-C    [id number] [facility]  Pgr.  [phone]

## 2017-08-04 NOTE — PROGRESS NOTES
Problem: Falls - Risk of  Goal: *Absence of falls  Outcome: Progressing Towards Goal  No falls during admission   Goal: *Knowledge of fall prevention  Outcome: Progressing Towards Goal  Does not attempt to ambulate unassisted    Problem: Surgical Pathway Post-Op Day 2 through Discharge  Goal: Medications  Outcome: Progressing Towards Goal  Tolerating medications  Goal: *Optimal pain control at patients stated goal  Outcome: Progressing Towards Goal  Pain well controlled with current ordered medications  Goal: *Adequate urinary output (equal to or greater than 30 milliliters/hour)  Outcome: Progressing Towards Goal  Voiding via purewick  Goal: *Demonstrates progressive activity  Outcome: Progressing Towards Goal  Worked with PT to get out of bed today    Problem: Pain  Goal: *Control of Pain  Outcome: Progressing Towards Goal  Pain well mangaged

## 2017-08-04 NOTE — PROGRESS NOTES
Orthopedic & Surgical Nursing Communication Tool      8:12 AM  8/4/2017     Bedside and Verbal shift change report given to Jewel Chris RN (incoming nurse) by Jayme Ansari RN (outgoing nurse) on [place] Lakeval[place]io Petersenin a 80 y.o. female who was admitted on 7/28/2017  1:28 PM. Report included the following information SBAR, Kardex, Intake/Output, MAR and Recent Results. Opportunity for questions and clarifications were given to the incoming nurse. Patient's bed is in low position, side rails x2, door open PRN, call bell within reach and patient not in distress.     Jayme Ansari RN

## 2017-08-04 NOTE — DISCHARGE INSTRUCTIONS
[facility] - [facility] / Dorothy Grade / [id number] : 1923    Admission date: 2017 Discharge date: 2017       Primary care provider: La Soni MD    Discharging provider:  Vahe Donohue MD  - Family Medicine Resident  Jasper Hebert MD - Attending, Family Medicine   . . . . . . . . . . . . . . . . . . . . . . . . . . . . . . . . . . . . . . . . . . . . . . . . . . . . . . . . . . . . . . . . . . . . . . . [place] FINAL DIAGNOSES & HOSPITAL COURSE:  History of Present Illness per admitting resident Dr. Morales Nurse: Sacha Banerjee is a 80 y.o. female with a h/o HTN, GERD, Hypothyroidism, 3rd degree AV block s/p pacer, Glaucoma, and osteoporosis who presents to the ER complaining of right-sided pelvic pain. History is per daughter and son (main caretakers). Per daughter, whom the patient was visiting for a 3 week stay in [place], reports that the patient was transferring from her walker to the toilet when she let go of her walker and fell to the floor on her bottom, catching herself on both elbows. No reported LOC. Initially after the fall the pt could ambulate fine however, since two days ago the pt reports being unable to get up. Pt reports she is unable to ambulate due to right-sided pelvic pain. Daughter states she has been giving patient 200 mg Advil for pain which has not helped greatly. Family denies any fever, cough, chest pain, abdominal pain, SOB. [facility] course with associated diagnosis:  R Hip Fracture s/p hemiarthroplasty POD#2 - stable on d/c -  fall 3yrs ago per son with underlying femoral head osteoporesis. Negative osteomyelitis on exam. Patient underwent an initial hip surgery where a thick yellowish substance was found on the Rt hip area. It was necessary to sample the fluid, culture, and close the wound without repair at that moment.  As the cultures were negative, patient underwent a second Rt Hip surgery and hemiarthroplasty was performed successfully. Patient tolerating well the procedure, pain well managed, and working well with PT/OT. It was recommended to discharge to SNF for continued care. Will follow up with PCP and Ortho team.    - F/U with PCP for transition of care visit on 8/10/2017  - F/U with ortho as outpatient in 2wks (need Xray hip/pelvis on visit)  - Continue Lovenox for 4wks per ortho  - Weight bearing as tolerated  - Continue pain management with Percocet and Tylenol     Asymptomatic Hyponatremia - Improving -  Developed Post-Op. Likely volume depletion and post-op course. - Repeat BMP on PCP follow up    HTN - stable  - Continue home Labetalol 100 mg and Norvasc 10 mg     3rd degree AV heart block - stable (s/p pacer placement)       Glaucoma - stable   - Continue home Dorzolamide-Timolol, Brimonidine, Latanoprost     Hypothyroidism - Stable - TSH 1.14 (6/13/2016)   - Continue home Synthroid 50 mcg daily      GERD - stable  - Start Pepcid at discharge for prophylaxis     NIDDM type II - A1c 6.5, stable, not on any medication.      Asymptomatic UTI/colonization - resolved     FOLLOW-UP CARE RECOMMENDATIONS:  Follow-up Information     Follow up With Details Comments Contact [address] Bishnu Phillips MD Go on 8/10/2017 Follow up hospital stay. Appointment at [address] [place] [address],[address] [place] [address]  [phone]      Saida Murdock MD Schedule an appointment as soon as possible for a visit in 2 weeks Follow up hip surgery. Make appointment as recommended [address]  [phone]              It is very important that you keep follow-up appointment(s). Bring discharge papers, medication list (and/or medication bottles) to follow-up appointments for review by outpatient provider(s).     FOLLOW-UP TESTS RECOMMENDED:   · Hip XR as recommended by Ortho, BMP to follow up Na levels    PENDING TEST RESULTS:  At the time of discharge the following test results are still pending: None. Please review these results as they become available. Specific symptoms to watch for: chest pain, shortness of breath, fever, chills, nausea, vomiting, diarrhea, change in mentation, falling, weakness, bleeding. DIET:  Regular Diet    ACTIVITY:  Per rehab and as weight tolerated per ortho instructions    WOUND CARE: As recommended by ortho instructions     EQUIPMENT needed:  As recommended by PT/OT    GOALS OF CARE:  x  Eventual return to home/independent/assisted living     Long term SNF      Hospice     No rehospitalization     Patient condition at discharge:   Functional status    Poor    x  Deconditioned      Independent   Cognition    Lucid   x  Forgetful (some sensescence)     Dementia   Catheters/lines (plus indication)    Christian     PICC      PEG    x  Can use [place]   Code status    Full code    x  DNR    . . . . . . . . . . . . . . . . . . . . . . . . . . . . . . . . . . . . . . . . . . . . . . . . . . . . . . . . . . . . . . . . . . . . . . . Jaclyn De La Fuente      CHRONIC MEDICAL CONDITIONS:  Problem List as of 8/4/2017  Date Reviewed: 6/14/2017          Codes Class Noted - Resolved    Hyponatremia ICD-10-CM: E87.1  ICD-9-CM: 276.1  8/3/2017 - Present        * (Principal)Hip fracture, right ([address] ICD-10-CM: S72.001A  ICD-9-CM: 820.8  7/28/2017 - Present        Systemic elastorrhexis ICD-10-CM: Q84.9  ICD-9-CM: 757.39  3/28/2016 - Present        History of cornea transplant ICD-10-CM: Z94.7  ICD-9-CM: V42.5  3/28/2016 - Present        Open-angle glaucoma ICD-10-CM: H40.10X0  ICD-9-CM: 365.10, 365.70  3/28/2016 - Present        Rejection of corneal graft ICD-10-CM: T86.840  ICD-9-CM: 996.51  3/28/2016 - Present        Hyperglycemia ICD-10-CM: R73.9  ICD-9-CM: 790.29  9/16/2014 - Present        S/P placement of cardiac pacemaker ICD-10-CM: Z95.0  ICD-9-CM: V45.01  9/4/2014 - Present    Overview Signed 9/4/2014  3:25 PM by Kelly Ojeda MD     9/4/2014 dual chamber [employer] pacemaker implant             Third degree heart block [facility]) ICD-10-CM: I44.2  ICD-9-CM: 426.0  9/3/2014 - Present        Advance directive discussed with patient ICD-10-CM: Z71.89  ICD-9-CM: V65.49  8/28/2014 - Present    Overview Signed 8/28/2014  8:44 AM by Kamala Montero MD     she and her son will discuss this. for now full code             Health care maintenance ICD-10-CM: Z00.00  ICD-9-CM: V70.0  8/28/2014 - Present    Overview Signed 8/28/2014  8:48 AM by Kamala Montero MD     Female exams:  No more  Mammogram:  No more  DEXA:  2011  LMP:  menopause  Last PAP:  No more    colonoscopy:  No more  FOBT:  No more  Glaucoma screen:  2014  Tdap:  declined  Pneumovax:  2004  Flu shot:  Needs 2014  EKG documentation: 2012  Advanced Directives:  Discussed 8/28/2014:   Patient and son will further discuss this and follow up. For now full code             H. pylori infection ICD-10-CM: A04.8  ICD-9-CM: 041.86  5/28/2013 - Present    Overview Signed 5/28/2013  9:18 AM by Kamala Montero MD     2013: You may have been exposed to h pylori stomach infection in the past. I suggest that you see a GI doctor if your symptoms persist..                   Angioedema ICD-10-CM: T78. 3XXA  ICD-9-CM: 995.1  4/30/2013 - Present    Overview Signed 4/30/2013  9:14 AM by Kamala Montero MD     To ace inhibitors and losarten             Glaucoma ICD-10-CM: H40.9  ICD-9-CM: 365.9  4/3/2012 - Present    Overview Signed 4/3/2012  1:28 PM by MD Dr. Jorge Luis Patel, COY             Hot flashes ICD-10-CM: R23.2  ICD-9-CM: 782.62  9/26/2011 - Present        Murmur, cardiac ICD-10-CM: R01.1  ICD-9-CM: 785.2  8/30/2011 - Present        Other and unspecified hyperlipidemia ICD-10-CM: E78.5  ICD-9-CM: 272.4  2/15/2011 - Present        HTN (hypertension) ICD-10-CM: I10  ICD-9-CM: 401.9  2/15/2011 - Present    Overview Signed 5/28/2013  9:13 AM by Kamala Montero MD     Goal < 150 SBP             GERD (gastroesophageal reflux disease) ICD-10-CM: K21.9  ICD-9-CM: 530.81  2/15/2011 - Present    Overview Signed 4/3/2012  1:25 PM by Vin Gaston MD      2008             Hypothyroid ICD-10-CM: E03.9  ICD-9-CM: 244.9  2/15/2011 - Present        Syncope and collapse ICD-10-CM: R55  ICD-9-CM: 780.2  2/15/2011 - Present    Overview Signed 4/3/2012  1:29 PM by Vin Gaston MD     May have been due to beta blocker             Atrioventricular block, complete [facility]) ICD-10-CM: I44.2  ICD-9-CM: 426.0  2/15/2011 - Present    Overview Addendum 4/3/2012  1:23 PM by Vin Gaston MD     10/2009, on metoprolol  Dr. Bienvenido Guerrero obtained by :   I understand that if any problems occur once I am at home I am to contact my physician. I understand and acknowledge receipt of the instructions indicated above. Physician's or R.N.'s Signature                                                                  Date/Time                                                                                                                                              Patient or Representative Signature                                                          Date/Time          Nutrition Recommendations For Discharge:    Continue Oral Nutrition Supplements at discharge:  Ensure Compact 1-2 times per day   for 30 days unless otherwise directed by your Primary Care Physician. This product can be purchased at your local grocery store or drug store and online. Bartolome Escalera MD           HIP DISCHARGE INSTRUCTIONS    Patient: Patricio Mcfarlane MRN: [id number]  SSN: [id number]              Please take the time to review the following instructions before you leave the hospital and use them as guidelines during your recovery from surgery.   If you have any questions you may contact my office at [phone]. SPECIAL INSTRUCTIONS :   1. Do not bend greater than 90 degrees at the hip for 4 weeks following your discharge  2. Avoid exercises or activities which bring the leg out or away from the mid-line of the body. The surgical repair involves this muscle and it will require 4 weeks to heal. You may disregard these instructions for a direct anterior approach. 3. You may walk as tolerated and are encouraged to work daily on progressing your activities with a walker initially. 4. You may transition to a cane for walking 5-7 days from surgery once you feel safe. You may use a walker for longer periods if you feel unstable. Wound Care/ Dressing Changes:      DRESSING :     Honey Comb Dressing : This may be removed to shower at 72 hours. This is used only in the hospital. Other dressing options can be purchased over the counter at a local pharmacy or medical supply vendor. A porous adhesive dressing such as pictured above can be purchased at a local [employer] or [employer]. You only need to keep the incision covered for 7 days after showers. A dressing may be used for longer if there are issues with clothing clinging to the incision. Showering/ Bathing: If your incision is dry without drainage you may shower following your discharge home. Your dressing should be removed for showering. It is fine to have water run over the incision. Do not vigorously scrub your incision. Apply a clean, dry dressing after you have dried your incision. Do not take a bath or get into a swimming pool / [facility] until you follow up with Dr. Aj Nava. Do not soak your incision under water. If there is continued drainage or you are concerned contact Dr. Herlinda Dillon    Showering/ Bathing: If your incision is dry without drainage you may shower following your discharge home. Your dressing should be removed for showering.  It is fine to have water run over the incision. Do not vigorously scrub your incision. Apply a clean, dry dressing after you have dried your incision. Do not take a bath or get into a swimming pool / [facility] until you follow up with Dr. Get Rodgers. Do not soak your incision under water. If there is continued drainage or you are concerned contact Dr Venecia Rodriguez office prior to showering [phone] ext [phone]. Diet:  You may advance to your regular diet as tolerated. Increase your clear liquid intake for the next 2-3 days. Medication:      1. You will be given prescriptions for pain medication when you are discharged from the hospital. The side effects of these medications can be substantial and the narcotic medications are not mandatory. You may substitute these medications with Tylenol or Alleve / Motrin. 2.  Please use the medications as prescribed. Pain medications may cause constipation- Colace twice daily and Miralax two scoops 2-3 times a day while taking the narcotic medication should help prevent constipation. Other possible side effects of pain medication are dizziness, headache, nausea, vomiting, and urinary retention. Discontinue the pain medication if you develop itching, rash, shortness of breath, or difficulties swallowing. If these symptoms become severe or are not relieved by discontinuing the medication, you should seek immediate medical attention. 3. Refills of pain medication are authorized during office hours only (8 AM- 5 PM  Monday thru Friday). Many of these medication will require you or a family member to pick-up a physical prescription at the office. 4. Medications other than antiinflammatories will not be called into the pharmacy after business hours. 5. Do not take Tylenol/Acetaminophen in addition to your pain medication as most pain medications already contain this ingredient. Do not exceed 4000mg of Tylenol/Acetaminophen per day. 6. You may resume the medication(s) you were taking prior to your surgery. Narcotics may change the effects of some antidepressant medication(s). If you have any questions about possible interactions between your regular medications and the pain medication, you should ask the pharmacist or contact the prescribing physician. 7. You will be discharged with prescriptions for additional pain medications (Tramadol or Toradol) and a medication for nausea and vomiting (Phenergan). You only need to fill these prescriptions if the primary pain medication is not working or you experiencing post-op nausea. 8. If you have constipation which is not improved by oral stool softeners then a Ducolax suppository should be purchased over the counter. 9. Continue the blood thinner (Aspirin or Lovenox) for a total of 30 days following surgery. Follow up appointment:    Please call our office at [phone] for your follow up appointment. This should be scheduled 14 days following the date of surgery. Physical Therapy / Nursing:    Physical Therapy following surgery will be arranged at home along with at home nursing care. They have specific instructions for rehab and wound care. .     Returning to work:    Normal return to work is 3-12 weeks following total hip replacement. Depending on your progression following surgery and specific job duties you may take longer for a full return to work. DRIVING    You should not return to driving until you are off all narcotic pain medications and able to safely and quickly apply the brakes. This is normally 3-6 weeks for left hips and 4-8 weeks for right hip. Important Signs and Symptoms:    If any of the following signs or symptoms occur, you should contact Dr. Erick Lizarraga office.   Please be advised if a problem arises which you feel requires immediate medical attention or you are unable to contact Dr. Erick Lizarraga office you should seek immediate medical attention at the ER or other health care facility you have access to.    1. A sudden increase in swelling and/or redness or warmth at the area your surgery was performed which isnt relieved by rest, ice, and elevation. 2. Oral temperature greater than 101 degrees for 12 hours or more which isnt relieved by an increase in fluid intake and taking 2 Tylenol every 4-6 hours. 3. Excessive drainage from your incisions, or drainage which hasnt stopped by 72 hours after your surgery. 4. Fever, chills, shortness of breath, chest pain, nausea, vomiting or other signs and symptoms which are of concern to you. frequently asked questions   What should I take for pain?  o In general you will be discharged with three medications for pain (Percocet 7.5 / 325mg, Oxycodone 5mg and Tramadol). This may vary slightly depending on what you were taking in the hospital.   - 1st Line - Percocet 1-2 tablets every 4-6 hrs (Or as directed).  This is the first and only medication you need to take. Initially you may need 2 tablets every 4 hours, but as your pain subsides, this will taper to 1 tablet every 6 hours. - 2nd Line - Oxycodone 1 every 8 hours (Or as directed), take these between Percocet doses if your pain is not below 4 / 10. This may be needed only for several days following your discharge.  Oxycodone may be taken more frequently if needed 1-2 tablets every 4-6 hours if the pain is severe between Percocet doses. - 3rd Line - Tramadol - Take this medication as directed if the Percocet and Oxycodone are not working. Once you taper off Percocet, this medication may also be used. - 4th Line - Add Alleve 220mg every 12 hours or Motrin 400mg (200mg x 2) every 8 hours   When should I call for advice regarding my pain?  o After 12 hours on the above regimen, if nothing is working call the office ([phone] or [phone]) or call my cell after hours [phone].  Can I get refills?  o Narcotic refills are provided for the first 6 weeks following surgery.    - I will generally try to taper down to a single narcotic medication by your two week appointment. o Try Tylenol 650mg along with Alleve 220mg or Motrin 200mg during the majority of the day. - Save the narcotic pain medications for physical therapy (1 hour prior) and before sleeping at night. - Keep in mind you need to discontinue these medications prior to returning to driving.  Is swelling normal?  o Almost everyone has some degree of swelling following surgery. o Following hip and knee replacement surgery, swelling can be normal below the incision for the first 1-2 weeks. - This swelling peaks around 5-7 days after surgery.   - You may have some bruising around the back of the thigh, calf and even into the foot.  What should I do for the swelling?  o Keep the limb elevated. o Apply compression socks (knee high for total knees and up to the mid-thigh for total hips.   o Heat or ice may be applied, choose the modality that makes you the most comfortable.  How long should I remain on blood thinners following surgery?  o Thirty days   When can I drive?  o Once you have stopped using regular narcotic pain medications (Percocet, Lortab, etc.) and can safely apply the brakes without hesitation.  When can I shower? o 72hours following surgery if the incision is dry.  o No submersion of the incision, bathing or swimming for 14 days following surgery or until cleared by Dr Abby Wong.  What do I do with the dressing when I shower?  o The dressing can be removed. o The incision is sealed with Dermabond (Biologic glue) and except for wounds which are draining should be watertight.  How active should I be following surgery?   o Progress activities in moderation at your own pace.   o Walk each day and set progressive goals with small increments (1st week - ½ block of walking, 2nd week - 1 block, 3rd week - 2 blocks, etc.)

## 2017-08-04 NOTE — PROGRESS NOTES
Problem: Mobility Impaired (Adult and Pediatric)  Goal: *Acute Goals and Plan of Care (Insert Text)  Physical Therapy Goals  Initiated 8/3/2017    1. Patient will move from supine to sit and sit to supine in bed with minimal assistance/contact guard assist within 4 days. 2. Patient will perform sit to stand with minimal assistance/contact guard assist within 4 days. 3. Patient will ambulate with minimal assistance/contact guard assist for 10 feet with the least restrictive device within 4 days. 4. Patient will verbalize and demonstrate understanding of hip precautions per protocol within 4 days. 5. Patient will perform hip home exercise program per protocol with minimal assistance/contact guard assist within 4 days. PHYSICAL THERAPY TREATMENT  Patient: Sidney Gregory (80 y.o. female)  Date: 8/4/2017  Diagnosis: Hip fracture, right ([address]  RIGHT HIP FRACTURE  RIGHT HIP FRACTURE Hip fracture, right (HCC)  Procedure(s) (LRB):  HIP HEMIARTHROPLASTY RIGHT (Right) 2 Days Post-Op  Precautions: Other (comment) (posterior hip precautions)      ASSESSMENT:  Patient awake in bed. Two sons present throughout treatment and assisting and translating, also used basic [place] and hand gestures to indicate instructions for mobility. Reviewed hip precautions and exercise handout sheet with her son and provided a written copy. Performed transfer training supine to sit at edge of bed and sit <> stand with moderate assist of one. Performed gait training with rolling walker and cues for sequence, step length and walker placement. Patient improving. Progression toward goals:  [X]      Improving appropriately and progressing toward goals  [ ]      Improving slowly and progressing toward goals  [ ]      Not making progress toward goals and plan of care will be adjusted       PLAN:  Patient continues to benefit from skilled intervention to address the above impairments. Continue treatment per established plan of care.   Discharge Recommendations:  Skilled Nursing Facility  Further Equipment Recommendations for Discharge:  tbd at SNF       SUBJECTIVE:   Patient stated, in Chinese if we were finished walking (translated by son)      OBJECTIVE DATA SUMMARY:   Functional Mobility Training:  Bed Mobility:     Supine to Sit: Maximum assistance;Assist x1;Additional time              Transfers:  Sit to Stand: Moderate assistance;Assist x1;Additional time  Stand to Sit: Assist x1;Additional time; Moderate assistance                             Ambulation/Gait Training:  Distance (ft): 6 Feet (ft)  Assistive Device: Walker, rolling;Gait belt  Ambulation - Level of Assistance: Minimal assistance; Additional time;Assist x1 (much verbal and visual cueing)        Gait Abnormalities: Decreased step clearance; Step to gait        Base of Support: Widened;Shift to left     Speed/Mariella: Delayed;Pace decreased (<100 feet/min)  Step Length: Right shortened;Left shortened                               Stairs: Therapeutic Exercises:   Reviewed and given written exercise sheet. Also added LAQs in sitting and general UE ex. Pain:  Pain Scale 1: Numeric (0 - 10)  Pain Intensity 1: 0              Activity Tolerance:   reports no pain, asked several times by her son  Please refer to the flowsheet for vital signs taken during this treatment.   After treatment:   [X] Patient left in no apparent distress sitting up in chair  [ ] Patient left in no apparent distress in bed  [X] Call bell left within reach  [X] Nursing notified  [X] Caregiver present  [X] Chair alarm activated      COMMUNICATION/COLLABORATION:   The patients plan of care was discussed with: Registered Nurse and Physician     Reza Grey PT   Time Calculation: 39 mins

## 2017-08-05 RX ORDER — LEVOTHYROXINE SODIUM 50 UG/1
TABLET ORAL
Qty: 90 TAB | Refills: 0 | Status: SHIPPED | OUTPATIENT
Start: 2017-08-05

## 2017-08-07 ENCOUNTER — PATIENT OUTREACH (OUTPATIENT)
Dept: FAMILY MEDICINE CLINIC | Age: 82
End: 2017-08-07

## 2017-08-07 NOTE — PROGRESS NOTES
NNTOCIP (Transitions of Care Inpatient)    Hospital Discharge Follow-Up      Date/Time:     8/7/2017 4:47 PM    Patient listed on discharge [facility]) report on 8/4/17. Patient discharged from [address] [place] [address] for [employer] fracture of right femur. RRAT score:Low Risk            3       Total Score        3 Has Seen PCP in Last 6 Months (Yes=3, No=0)        Criteria that do not apply:    . Living with Significant Other. Assisted Living. LTAC. SNF. or   Rehab    Patient Length of Stay (>5 days = 3)    IP Visits Last 12 Months (1-3=4, 4=9, >4=11)    Pt. Coverage (Medicare=5 , Medicaid, or Self-Pay=4)    Charlson Comorbidity Score (Age + Comorbid Conditions)        Medical History:     Past Medical History:   Diagnosis Date    Atrioventricular block, complete ([address] 2/15/2011    GERD (gastroesophageal reflux disease) 2/15/2011    HTN (hypertension) 2/15/2011    Hypercholesteremia     Hypothyroid 2/15/2011    Murmur     Osteoporosis     Other and unspecified hyperlipidemia 2/15/2011    Pacemaker     Syncope and collapse 2/15/2011    Thyroid disease        Nurse Navigator(NN) contacted the patient by telephone to perform post hospital discharge assessment but was unable to reach the patient. VM left for the patient to return a call to this office to complete a discharge assessment. Patient was discharged to the [id number] [place] [address] on 8/4/17 and outreach was made to this facility and message left for  Bernabe Humphreys to return a call to this office to patient assessment and to discuss this patient scheduled appointment.    PCP/Specialist follow up: Patient scheduled to follow up with Dr. SOTELO [facility] on 8/10/17 at 10:20 AM.

## 2017-08-10 ENCOUNTER — PATIENT OUTREACH (OUTPATIENT)
Dept: INTERNAL MEDICINE CLINIC | Age: 82
End: 2017-08-10

## 2017-08-10 NOTE — PROGRESS NOTES
Community Care Team Documentation for Patient in Located within [facility]  Initial Follow Up       Patient was admitted to [address] from 7/28/17 to 8/4/17. Patient was discharged to [address] [facility], Located within [facility], on 8/4/17 (date). See previous [place] [facility] Care Team documentation under Patient Outreach. Hospital Discharge diagnosis:  Hip fracture. RRAT score: 15    Advance Medical Directive on file in EMR? no     Total Hospitalizations/ED visits last 6 months? IP - 1; ED - 0    PCP : Emeka Walker MD  Nurse Navigator in PCP office: Janis Escobar  Note routed to Nurse Navigator team.    PCP follow up appointment:  Not yet scheduled    Per Karla Pierre at [facility], Confirmed that patient arrived safely with admission packet in order. PT/OT beginning. Mod - max assist for everything at this time. Participating in therapy. Medically stable. Plan for DC home with spouse. Date TBD    SNF Attending:  Dr. Ayaan Acosta MD    Anticipated discharge date from SNF:  Undetermined    SNF discharge plan:  DC home with spouse    Medications were not reconciled and general patient assessment was not completed during this skilled nursing facility outreach.      Clay Tucker, MSN, RN, ACNS-BC, [facility]  Nurse Navigator, [employer] [phone]

## 2017-08-12 LAB
BACTERIA SPEC CULT: NORMAL
BACTERIA SPEC CULT: NORMAL
GRAM STN SPEC: NORMAL
GRAM STN SPEC: NORMAL
SERVICE CMNT-IMP: NORMAL
SERVICE CMNT-IMP: NORMAL

## 2017-08-14 RX ORDER — LABETALOL 100 MG/1
TABLET, FILM COATED ORAL
Qty: 60 TAB | Refills: 9 | Status: SHIPPED | OUTPATIENT
Start: 2017-08-14

## 2017-08-17 ENCOUNTER — PATIENT OUTREACH (OUTPATIENT)
Dept: CASE MANAGEMENT | Age: 82
End: 2017-08-17

## 2017-08-17 NOTE — PROGRESS NOTES
Community Care Team Documentation for Patient in [facility]  Subsequent Follow up     Patient remains at  [facility] ([facility]). See previous [facility] Team notes. PCP : Yessica Kamara MD  Nurse Navigator in PCP office: Nuria Sandoval    Per Fara Archibald at [facility], Plan for pt to continue skilled therapy for 4-6 weeks then transition to LTC. Currently min assist with bed mobility/transfers and max assist with ALDs except for self feeding. Pt has Medicaid. Medications were not reconciled and general patient assessment was not completed during this skilled nursing facility outreach.      JOHN McdonoughW

## 2017-08-24 ENCOUNTER — PATIENT OUTREACH (OUTPATIENT)
Dept: CASE MANAGEMENT | Age: 82
End: 2017-08-24

## 2017-08-25 NOTE — PROGRESS NOTES
Community Care Team Documentation for Patient in [facility]  Subsequent Follow up     Patient remains at [facility] ([facility]). See previous [facility] Team notes. PCP : Gilbert Joseph MD    Per Zach Come at SNF, Pt currently only open to PT. PT plans to end skilled services 9/4. Plan to transition to LTC 9/5. Medications were not reconciled and general patient assessment was not completed during this skilled nursing facility outreach.      JOHN HudsonW

## 2017-08-31 ENCOUNTER — PATIENT OUTREACH (OUTPATIENT)
Dept: CASE MANAGEMENT | Age: 82
End: 2017-08-31

## 2017-08-31 NOTE — PROGRESS NOTES
Community Care Team Documentation for Patient in [facility]  Subsequent Follow up     Patient remains at [address]  ([facility]). See previous [facility] Team notes. PCP : Carolina Gracia MD    Per Luis Winter at SNF, Progressing well, family supportive. Pt will be staying at [facility], pay class change on 9/5/17, she will no longer be skilled starting that day, last day of therapy 9/4. Medications were not reconciled and general patient assessment was not completed during this skilled nursing facility outreach.      JOHN NavaW

## 2017-09-07 ENCOUNTER — PATIENT OUTREACH (OUTPATIENT)
Dept: FAMILY MEDICINE CLINIC | Age: 82
End: 2017-09-07

## 2017-09-07 NOTE — PROGRESS NOTES
Community Care Team Documentation for Patient in [facility]  Subsequent Follow up     Patient remains at [address] of [facility] ([facility]). See previous [facility] Team notes. RRAT score: 15    PCP : Conor Lundberg MD  Nurse Navigator in PCP office: Cristhian Torres    PCP follow up appointment:  None needed - patient staying at SNF in LTC    Per Lolis Aguilar at [facility] transitioned to LTC on 9/5/17. CCT will sign off at this time. Medications were not reconciled and general patient assessment was not completed during this skilled nursing facility outreach.      Suyapa Carmona, MSN, RN, ACNS-BC, [facility]  Nurse Navigator, [email or web address] [phone]

## 2017-09-28 ENCOUNTER — CLINICAL SUPPORT (OUTPATIENT)
Dept: CARDIOLOGY CLINIC | Age: 82
End: 2017-09-28

## 2017-09-28 ENCOUNTER — OFFICE VISIT (OUTPATIENT)
Dept: CARDIOLOGY CLINIC | Age: 82
End: 2017-09-28

## 2017-09-28 VITALS — SYSTOLIC BLOOD PRESSURE: 108 MMHG | HEART RATE: 76 BPM | DIASTOLIC BLOOD PRESSURE: 58 MMHG

## 2017-09-28 DIAGNOSIS — I15.9 SECONDARY HYPERTENSION: ICD-10-CM

## 2017-09-28 DIAGNOSIS — I10 ESSENTIAL HYPERTENSION: ICD-10-CM

## 2017-09-28 DIAGNOSIS — R54 FRAIL ELDERLY: ICD-10-CM

## 2017-09-28 DIAGNOSIS — Z95.0 PACEMAKER: Primary | ICD-10-CM

## 2017-09-28 DIAGNOSIS — Z96.641 S/P HIP REPLACEMENT, RIGHT: ICD-10-CM

## 2017-09-28 DIAGNOSIS — Z95.0 CARDIAC PACEMAKER IN SITU: Primary | ICD-10-CM

## 2017-09-28 DIAGNOSIS — I44.2 THIRD DEGREE HEART BLOCK (HCC): ICD-10-CM

## 2017-09-28 RX ORDER — AMLODIPINE BESYLATE 5 MG/1
TABLET ORAL
Qty: 30 TAB | Refills: 6 | Status: SHIPPED | OUTPATIENT
Start: 2017-09-28

## 2017-09-28 NOTE — PROGRESS NOTES
Subjective:       Isha Mckoy is a 80 y. o. woman who is here with her son, she is in a wheel chair. She fell and broke her hip in July, she had a right hip replacement at [facility]. She is now living at [facility]. She denies SOB or chest pain. She has had pacemaker implanted for syncope and complete heart block  Remote Echo in the past had shown no significant valvular disease and there was normal LVEF  She is now using a wheel chair. She is weaker now due to age. ECHO: 2009_ [place]  STRESS 2009: negative   ECHO 2011: EF 65%, grade 1 DD, mild MR  PACER 9/4/2014: [facility] [facility]   Problem List  Date Reviewed: 9/28/2017          Codes Class Noted    Hyponatremia ICD-10-CM: E87.1  ICD-9-CM: 276.1  8/3/2017        Hip fracture, right ([address] ICD-10-CM: S72.001A  ICD-9-CM: 820.8  7/28/2017        Systemic elastorrhexis ICD-10-CM: Q84.9  ICD-9-CM: 757.39  3/28/2016        History of cornea transplant ICD-10-CM: Z94.7  ICD-9-CM: V42.5  3/28/2016        Open-angle glaucoma ICD-10-CM: H40.10X0  ICD-9-CM: 365.10, 365.70  3/28/2016        Rejection of corneal graft ICD-10-CM: T86.840  ICD-9-CM: 996.51  3/28/2016        Hyperglycemia ICD-10-CM: R73.9  ICD-9-CM: 790.29  9/16/2014        S/P placement of cardiac pacemaker ICD-10-CM: Z95.0  ICD-9-CM: V45.01  9/4/2014    Overview Signed 9/4/2014  3:25 PM by Bryson Roberts MD     9/4/2014 dual chamber [employer] pacemaker implant             Third degree heart block ([address] ICD-10-CM: I44.2  ICD-9-CM: 426.0  9/3/2014        Advance directive discussed with patient ICD-10-CM: Z71.89  ICD-9-CM: V65.49  8/28/2014    Overview Signed 8/28/2014  8:44 AM by Tana Dean MD     she and her son will discuss this.   for now full code             Health care maintenance ICD-10-CM: Z00.00  ICD-9-CM: V70.0  8/28/2014    Overview Signed 8/28/2014  8:48 AM by Tana Dean MD     Female exams:  No more  Mammogram:  No more  DEXA:  2011  LMP: menopause  Last PAP:  No more    colonoscopy:  No more  FOBT:  No more  Glaucoma screen:  2014  Tdap:  declined  Pneumovax:  2004  Flu shot:  Needs 2014  EKG documentation: 2012  Advanced Directives:  Discussed 8/28/2014:   Patient and son will further discuss this and follow up. For now full code             H. pylori infection ICD-10-CM: A04.8  ICD-9-CM: 041.86  5/28/2013    Overview Signed 5/28/2013  9:18 AM by Alana Peterson MD     2013: You may have been exposed to h pylori stomach infection in the past. I suggest that you see a GI doctor if your symptoms persist..                   Angioedema ICD-10-CM: T78. 3XXA  ICD-9-CM: 995.1  4/30/2013    Overview Signed 4/30/2013  9:14 AM by Alana Peterson MD     To ace inhibitors and losarten             Glaucoma ICD-10-CM: H40.9  ICD-9-CM: 365.9  4/3/2012    Overview Signed 4/3/2012  1:28 PM by MD Dr. Eileen Amaya VEI             Hot flashes ICD-10-CM: R23.2  ICD-9-CM: 782.62  9/26/2011        Murmur, cardiac ICD-10-CM: R01.1  ICD-9-CM: 785.2  8/30/2011        Other and unspecified hyperlipidemia ICD-10-CM: E78.5  ICD-9-CM: 272.4  2/15/2011        HTN (hypertension) ICD-10-CM: I10  ICD-9-CM: 401.9  2/15/2011    Overview Signed 5/28/2013  9:13 AM by Alana Peterson MD     Goal < 150 SBP             GERD (gastroesophageal reflux disease) ICD-10-CM: K21.9  ICD-9-CM: 530.81  2/15/2011    Overview Signed 4/3/2012  1:25 PM by Alana Peterson MD      2008             Hypothyroid ICD-10-CM: E03.9  ICD-9-CM: 244.9  2/15/2011        Syncope and collapse ICD-10-CM: R55  ICD-9-CM: 780.2  2/15/2011    Overview Signed 4/3/2012  1:29 PM by Alana Peterson MD     May have been due to beta blocker             Atrioventricular block, complete ([facility] ICD-10-CM: I44.2  ICD-9-CM: 426.0  2/15/2011    Overview Addendum 4/3/2012  1:23 PM by Alana Peterson MD     10/2009, on metoprolol  Dr. Varela [facility] Refill    labetalol (NORMODYNE) 100 mg tablet TAKE ONE TABLET BY MOUTH EVERY 12 HOURS 60 Tab 9    levothyroxine (SYNTHROID) 50 mcg tablet TAKE ONE TABLET BY MOUTH DAILY BEFORE BREAKFAST 90 Tab 0    oxyCODONE-acetaminophen (PERCOCET) 5-325 mg per tablet Take 1 Tab by mouth every six (6) hours as needed for Pain ([place] 1 hour before physical therapy). Max Daily Amount: 4 Tabs. 30 Tab 0    bisacodyl (DULCOLAX, BISACODYL,) 5 mg EC tablet Take 5 mg by mouth as needed for Constipation.  amLODIPine (NORVASC) 10 mg tablet TAKE ONE TABLET BY MOUTH DAILY 30 Tab 7    latanoprost (XALATAN) 0.005 % ophthalmic solution Administer 1 drop to both eyes nightly.  dorzolamide-timolol (COSOPT) 2-0.5 % ophthalmic solution Administer 1 drop to both eyes two (2) times a day.  brimonidine (ALPHAGAN P) 0.1 % ophthalmic solution Administer 1 drop to both eyes two (2) times a day.  omega-3 fatty acids-vitamin e (FISH OIL) 1,000 mg Cap Take 1 capsule by mouth daily.  famotidine (PEPCID) 10 mg tablet Take 1 Tab by mouth two (2) times a day. 28 Tab 0    therapeutic multivitamin (THERAGRAN) tablet Take 1 Tab by mouth daily.        Allergies   Allergen Reactions    Ace Inhibitors Cough    Cozaar [Losartan] Angioedema    Hydrochlorothiazide Other (comments) and Vertigo     Weak in legs, HYPONATREMIA (109 - Sept 2014 admission)    Niaspan [Niacin] Nausea Only    Pravastatin Other (comments)     dizzy    Prilosec [Omeprazole] Myalgia    Sular [Nisoldipine] Vertigo     Past Medical History:   Diagnosis Date    Atrioventricular block, complete ([address] 2/15/2011    GERD (gastroesophageal reflux disease) 2/15/2011    HTN (hypertension) 2/15/2011    Hypercholesteremia     Hypothyroid 2/15/2011    Murmur     Osteoporosis     Other and unspecified hyperlipidemia 2/15/2011    Pacemaker     Syncope and collapse 2/15/2011    Thyroid disease      Past Surgical History:   Procedure Laterality Date    HX GYN hyst    HX HEENT      corneal tx    HX PACEMAKER      INS PPM/ICD LED DUAL ONLY  9/4/2014          Family History   Problem Relation Age of Onset    Alcohol abuse Brother      Social History   Substance Use Topics    Smoking status: Never Smoker    Smokeless tobacco: Never Used    Alcohol use No      Review of Systems    Constitutional: Negative for fever, chills, weight loss. HEENT: Negative for nosebleeds, congestion, neck pain, tinnitus, and vision changes. Respiratory: Negative for cough, hemoptysis, sputum production, and wheezing. Cardiovascular: Negative for chest pain, palpitations, orthopnea, claudication, leg swelling, syncope, and PND. Gastrointestinal: Negative for nausea, vomiting, diarrhea, blood in stool and melena. Genitourinary: Negative for dysuria, hematuria. Musculoskeletal: Negative for myalgias. + arthralgia of right hip   Skin: Negative for rash and itching. Heme: Does  bruise easily. Neurological: Negative for speech change and + leg weakness      Objective:     Visit Vitals    /58 (BP 1 Location: Right arm, BP Patient Position: Sitting)    Pulse 76    LMP 03/05/1963      Physical Exam:   General:  alert, cooperative, no distress, appears stated age. Frail in wheel chair. Neck:  no bruits, no JVD   Chest Wall:  respiratory effort normal   Lung: Crackles RLL   Heart:  normal rate and regular rhythm, 2/6 murmur heard at LUSB   Abdomen: soft, flat   Extremities: No edema   Skin: pacemaker site on the left heals well, unremarkable       Assessment/Plan:       ICD-10-CM ICD-9-CM    1. Pacemaker Z95.0 V45.01    2. Third degree heart block (HCC) I44.2 426.0    3. Essential hypertension I10 401.9    4. S/P hip replacement, right Z96.641 V43.64    5. Frail elderly R54 797      She is doing well s/p hip replacement. Device check today shows proper functioning, 99% . 9-10 years left on pacemaker generator. She is now set up for remote monitoring.    BP today on low side, recommend d/c norvasc. Reviewed medications and side effects with her son. Follow-up Disposition: Not on Katerina TODD Valle.  [facility]  Electrophysiology/Cardiology  [facility]  [address] [place] [address] 91  [address] [facility], [address] [place] [address]  [phone]

## 2017-09-28 NOTE — MR AVS SNAPSHOT
Visit Information Date & Time Provider Department Dept. Phone Encounter #  
 9/28/2017  9:40 AM Luther Roblero MD [facility] [phone] Follow-up Instructions Return in about 1 year (around 9/28/2018). Your Appointments 10/30/2018 10:15 AM  
PACEMAKER with ELSA3ANTOLIN [facility] (KITTY SCHEDULING) Appt Note: zhen mendoza, annual thresh/M, see [address] [place]  
[address] [place] [address]  
  
    
 10/30/2018 10:20 AM  
ESTABLISHED PATIENT with Luther Roblero MD  
[facility] ([facility]) Appt Note: zhen mendoza, annual thresh/M, see [address] [place]  
[address] [place] [address]  
  
    
  
 1/10/2018 10:15 AM  
REMOTE OFFICE VISIT with Dale Roper [facility] (KITTY SCHEDULING) Appt Note: zhen mendoza b 9/28/17  
 [address] [place]  
[address] [place] [address]  
  
    
 4/18/2018 11:15 AM  
REMOTE OFFICE VISIT with Dale Roper [facility] (KITTY SCHEDULING) Appt Note: verónica fabian,  
 [address] [place]  
[phone]  
  
    
 7/25/2018  9:30 AM  
REMOTE OFFICE VISIT with Dale Roper [facility] (KITTY SCHEDULING) Appt Note: zhen mendoza  
 [address] [place]  
[phone] Upcoming Health Maintenance Date Due Pneumococcal 65+ Low/Medium Risk (2 of 2 - PPSV23) 7/27/2009 GLAUCOMA SCREENING Q2Y 8/28/2016 MEDICARE YEARLY EXAM 6/15/2018 DTaP/Tdap/Td series (2 - Td) 7/28/2027 Allergies as of 9/28/2017  Review Complete On: 9/28/2017 By: Luther Roblero MD  
 Severity Noted Reaction Type Reactions Ace Inhibitors  06/03/2011    Cough Cozaar [Losartan]  06/03/2011    Angioedema Hydrochlorothiazide  05/28/2013    Other (comments), Vertigo Weak in legs, HYPONATREMIA (109 - Sept 2014 admission) Niaspan [Niacin]  06/03/2011    Nausea Only Pravastatin  09/06/2011    Other (comments)  
 dizzy Prilosec [Omeprazole]  03/05/2013    Myalgia Sular [Nisoldipine]  06/03/2011    Vertigo Current Immunizations  Reviewed on 8/28/2014 Name Date Tdap 7/28/2017  2:49 PM  
 ZZZ-RETIRED (DO NOT USE) Pneumococcal Vaccine (Unspecified Type) 7/27/2004 Not reviewed this visit You Were Diagnosed With   
  
 Codes Comments Pacemaker    -  Primary ICD-10-CM: Z95.0 ICD-9-CM: V45.01 Third degree heart block (HCC)     ICD-10-CM: I44.2 ICD-9-CM: 426.0 Essential hypertension     ICD-10-CM: I10 
ICD-9-CM: 401.9 S/P hip replacement, right     ICD-10-CM: M25.064 ICD-9-CM: V43.64 Frail elderly     ICD-10-CM: R54 
ICD-9-CM: 691 Secondary hypertension     ICD-10-CM: I15.9 ICD-9-CM: 405.99 Vitals BP Pulse LMP OB Status Smoking Status 108/58 (BP 1 Location: Right arm, BP Patient Position: Sitting) 76 03/05/1963 Postmenopausal Never Smoker Preferred Pharmacy Pharmacy Name Phone [facility] [address] [place] [address], [address] [place] [address] [phone] Your Updated Medication List  
  
   
This list is accurate as of: 9/28/17 10:33 AM.  Always use your most recent med list. amLODIPine 5 mg tablet Commonly known as:  Marbella Abraham TAKE ONE TABLET BY MOUTH DAILY  
  
 brimonidine 0.1 % ophthalmic solution Commonly known as:  ALPHAGAN P Administer 1 drop to both eyes two (2) times a day. dorzolamide-timolol 22.3-6.8 mg/mL ophthalmic solution Commonly known as:  COSOPT Administer 1 drop to both eyes two (2) times a day. DULCOLAX (BISACODYL) 5 mg EC tablet Generic drug:  bisacodyl Take 5 mg by mouth as needed for Constipation. FISH OIL 1,000 mg Cap Generic drug:  omega-3 fatty acids-vitamin e Take 1 capsule by mouth daily. labetalol 100 mg tablet Commonly known as:  NORMODYNE  
TAKE ONE TABLET BY MOUTH EVERY 12 HOURS  
  
 latanoprost 0.005 % ophthalmic solution Commonly known as:  Yunior Jorge Administer 1 drop to both eyes nightly. levothyroxine 50 mcg tablet Commonly known as:  SYNTHROID  
TAKE ONE TABLET BY MOUTH DAILY BEFORE BREAKFAST  
  
 oxyCODONE-acetaminophen 5-325 mg per tablet Commonly known as:  PERCOCET Take 1 Tab by mouth every six (6) hours as needed for Pain ([place] 1 hour before physical therapy). Max Daily Amount: 4 Tabs. Prescriptions Sent to Pharmacy Refills  
 amLODIPine (NORVASC) 5 mg tablet 6 Sig: TAKE ONE TABLET BY MOUTH DAILY Class: Normal  
 Pharmacy: [facility] Paget [address] #: [phone] Follow-up Instructions Return in about 1 year (around 9/28/2018). Introducing \[facility]\"" & HEALTH SERVICES! [facility] introduces [employer] patient portal. Now you can access parts of your medical record, email your doctor's office, and request medication refills online. 1. In your internet browser, go to [email or web address] 2. Click on the First Time User? Click Here link in the Sign In box. You will see the New Member Sign Up page. 3. Enter your [employer] Access Code exactly as it appears below. You will not need to use this code after youve completed the sign-up process. If you do not sign up before the expiration date, you must request a new code. · [employer] Access Code: [email or web address] Expires: 10/26/2017  2:02 PM 
 
4. Enter the last four digits of your Social Security Number (xxxx) and Date of Birth (mm/dd/yyyy) as indicated and click Submit. You will be taken to the next sign-up page. 5. Create a [employer] ID. This will be your [employer] login ID and cannot be changed, so think of one that is secure and easy to remember. 6. Create a [employer] password. You can change your password at any time. 7. Enter your Password Reset Question and Answer. This can be used at a later time if you forget your password. 8. Enter your e-mail address. You will receive e-mail notification when new information is available in [address] 9. Click Sign Up. You can now view and download portions of your medical record. 10. Click the Download Summary menu link to download a portable copy of your medical information. If you have questions, please visit the Frequently Asked Questions section of the [employer] website. Remember, [employer] is NOT to be used for urgent needs. For medical emergencies, dial 911. Now available from your iPhone and Android! Please provide this summary of care documentation to your next provider. Your primary care clinician is listed as Franki Kohli. If you have any questions after today's visit, please call [phone].

## 2017-09-28 NOTE — PROGRESS NOTES
Subjective:       Susi Jackson is a 80 y. o. woman who is here with her son, she is in a wheel chair. She fell and broke her hip in July, she had a right hip replacement at [facility]. She is now living at [facility]. She denies SOB or chest pain. She has had pacemaker implanted for syncope and complete heart block  Remote Echo in the past had shown no significant valvular disease and there was normal LVEF  She is now using a wheel chair. She is weaker now due to age. ECHO: 2009_ [place]  STRESS 2009: negative   ECHO 2011: EF 65%, grade 1 DD, mild MR  PACER 9/4/2014: [facility]   Problem List  Date Reviewed: 9/28/2017          Codes Class Noted    Hyponatremia ICD-10-CM: E87.1  ICD-9-CM: 276.1  8/3/2017        Hip fracture, right ([address] ICD-10-CM: S72.001A  ICD-9-CM: 820.8  7/28/2017        Systemic elastorrhexis ICD-10-CM: Q84.9  ICD-9-CM: 757.39  3/28/2016        History of cornea transplant ICD-10-CM: Z94.7  ICD-9-CM: V42.5  3/28/2016        Open-angle glaucoma ICD-10-CM: H40.10X0  ICD-9-CM: 365.10, 365.70  3/28/2016        Rejection of corneal graft ICD-10-CM: T86.840  ICD-9-CM: 996.51  3/28/2016        Hyperglycemia ICD-10-CM: R73.9  ICD-9-CM: 790.29  9/16/2014        S/P placement of cardiac pacemaker ICD-10-CM: Z95.0  ICD-9-CM: V45.01  9/4/2014    Overview Signed 9/4/2014  3:25 PM by Creig Galeazzi, MD     9/4/2014 dual chamber [facility] pacemaker implant             Third degree heart block ([address] ICD-10-CM: I44.2  ICD-9-CM: 426.0  9/3/2014        Advance directive discussed with patient ICD-10-CM: Z71.89  ICD-9-CM: V65.49  8/28/2014    Overview Signed 8/28/2014  8:44 AM by Kathe Stallworth MD     she and her son will discuss this.   for now full code             Health care maintenance ICD-10-CM: Z00.00  ICD-9-CM: V70.0  8/28/2014    Overview Signed 8/28/2014  8:48 AM by Kathe Stallworth MD     Female exams:  No more  Mammogram:  No more  DEXA:  2011  LMP: menopause  Last PAP:  No more    colonoscopy:  No more  FOBT:  No more  Glaucoma screen:  2014  Tdap:  declined  Pneumovax:  2004  Flu shot:  Needs 2014  EKG documentation: 2012  Advanced Directives:  Discussed 8/28/2014:   Patient and son will further discuss this and follow up. For now full code             H. pylori infection ICD-10-CM: A04.8  ICD-9-CM: 041.86  5/28/2013    Overview Signed 5/28/2013  9:18 AM by Salomón Castaneda MD     2013: You may have been exposed to h pylori stomach infection in the past. I suggest that you see a GI doctor if your symptoms persist..                   Angioedema ICD-10-CM: T78. 3XXA  ICD-9-CM: 995.1  4/30/2013    Overview Signed 4/30/2013  9:14 AM by Salomón Castaneda MD     To ace inhibitors and losarten             Glaucoma ICD-10-CM: H40.9  ICD-9-CM: 365.9  4/3/2012    Overview Signed 4/3/2012  1:28 PM by MD Dr. Asher Borden VEI             Hot flashes ICD-10-CM: R23.2  ICD-9-CM: 782.62  9/26/2011        Murmur, cardiac ICD-10-CM: R01.1  ICD-9-CM: 785.2  8/30/2011        Other and unspecified hyperlipidemia ICD-10-CM: E78.5  ICD-9-CM: 272.4  2/15/2011        HTN (hypertension) ICD-10-CM: I10  ICD-9-CM: 401.9  2/15/2011    Overview Signed 5/28/2013  9:13 AM by Salomón Castaneda MD     Goal < 150 SBP             GERD (gastroesophageal reflux disease) ICD-10-CM: K21.9  ICD-9-CM: 530.81  2/15/2011    Overview Signed 4/3/2012  1:25 PM by Salomón Castaneda MD      2008             Hypothyroid ICD-10-CM: E03.9  ICD-9-CM: 244.9  2/15/2011        Syncope and collapse ICD-10-CM: R55  ICD-9-CM: 780.2  2/15/2011    Overview Signed 4/3/2012  1:29 PM by Salomón Castaneda MD     May have been due to beta blocker             Atrioventricular block, complete ([address] ICD-10-CM: I44.2  ICD-9-CM: 426.0  2/15/2011    Overview Addendum 4/3/2012  1:23 PM by Salomón Castaneda MD     10/2009, on metoprolol  Dr. Sharri Slutana Refill    labetalol (NORMODYNE) 100 mg tablet TAKE ONE TABLET BY MOUTH EVERY 12 HOURS 60 Tab 9    levothyroxine (SYNTHROID) 50 mcg tablet TAKE ONE TABLET BY MOUTH DAILY BEFORE BREAKFAST 90 Tab 0    oxyCODONE-acetaminophen (PERCOCET) 5-325 mg per tablet Take 1 Tab by mouth every six (6) hours as needed for Pain ([place] 1 hour before physical therapy). Max Daily Amount: 4 Tabs. 30 Tab 0    bisacodyl (DULCOLAX, BISACODYL,) 5 mg EC tablet Take 5 mg by mouth as needed for Constipation.  amLODIPine (NORVASC) 10 mg tablet TAKE ONE TABLET BY MOUTH DAILY 30 Tab 7    latanoprost (XALATAN) 0.005 % ophthalmic solution Administer 1 drop to both eyes nightly.  dorzolamide-timolol (COSOPT) 2-0.5 % ophthalmic solution Administer 1 drop to both eyes two (2) times a day.  brimonidine (ALPHAGAN P) 0.1 % ophthalmic solution Administer 1 drop to both eyes two (2) times a day.  omega-3 fatty acids-vitamin e (FISH OIL) 1,000 mg Cap Take 1 capsule by mouth daily.        Allergies   Allergen Reactions    Ace Inhibitors Cough    Cozaar [Losartan] Angioedema    Hydrochlorothiazide Other (comments) and Vertigo     Weak in legs, HYPONATREMIA (109 - Sept 2014 admission)    Niaspan [Niacin] Nausea Only    Pravastatin Other (comments)     dizzy    Prilosec [Omeprazole] Myalgia    Sular [Nisoldipine] Vertigo     Past Medical History:   Diagnosis Date    Atrioventricular block, complete ([address] 2/15/2011    GERD (gastroesophageal reflux disease) 2/15/2011    HTN (hypertension) 2/15/2011    Hypercholesteremia     Hypothyroid 2/15/2011    Murmur     Osteoporosis     Other and unspecified hyperlipidemia 2/15/2011    Pacemaker     Syncope and collapse 2/15/2011    Thyroid disease      Past Surgical History:   Procedure Laterality Date    HX GYN      hyst    HX HEENT      corneal tx    HX PACEMAKER      INS PPM/ICD LED DUAL ONLY  9/4/2014          Family History   Problem Relation Age of Onset    Alcohol abuse Brother      Social History   Substance Use Topics    Smoking status: Never Smoker    Smokeless tobacco: Never Used    Alcohol use No      Review of Systems    Constitutional: Negative for fever, chills, weight loss. HEENT: Negative for nosebleeds, congestion, neck pain, tinnitus, and vision changes. Respiratory: Negative for cough, hemoptysis, sputum production, and wheezing. Cardiovascular: Negative for chest pain, palpitations, orthopnea, claudication, leg swelling, syncope, and PND. Gastrointestinal: Negative for nausea, vomiting, diarrhea, blood in stool and melena. Genitourinary: Negative for dysuria, hematuria. Musculoskeletal: Negative for myalgias. + arthralgia of right hip   Skin: Negative for rash and itching. Heme: Does  bruise easily. Neurological: Negative for speech change and + leg weakness      Objective:     Visit Vitals    /58 (BP 1 Location: Right arm, BP Patient Position: Sitting)    Pulse 76    LMP 03/05/1963      Physical Exam:   General:  alert, cooperative, no distress, appears stated age. Frail in wheel chair. Neck:  no bruits, no JVD   Chest Wall:  respiratory effort normal   Lung: Crackles RLL   Heart:  normal rate and regular rhythm, 2/6 murmur heard at LUSB   Abdomen: soft, flat   Extremities: No edema   Skin: pacemaker site on the left heals well, unremarkable       Assessment/Plan:       ICD-10-CM ICD-9-CM    1. Pacemaker Z95.0 V45.01    2. Third degree heart block (HCC) I44.2 426.0    3. Essential hypertension I10 401.9    4. S/P hip replacement, right Z96.641 V43.64    5. Frail elderly R54 797      She can stand but cannot walk per her son, s/p hip replacement. Device check today shows proper functioning, 99% . 9-10 years left on pacemaker generator. She is now set up for remote monitoring. BP today on low side, recommend to reduce to 5 mg qd norvasc. Reviewed medications and side effects with her son.   Future Appointments  Date Time Provider Jorge A Chu   1/10/2018 10:15 AM Eloisa Dumont KITTY SCHED   4/18/2018 11:15 AM REMOTE1, [address]   7/25/2018 9:30 AM REMOTE1, [address]   10/30/2018 10:15 AM PACEMAKER3, ANTOLIN GARCIA KITTY SCHED   10/30/2018 10:20 AM MD Kurtis Camarena M.D.  [facility] [place]  Electrophysiology/Cardiology  [facility] [place]  [address], [address] , [facility] [address]  [address]  [phone]

## 2018-01-01 ENCOUNTER — TELEPHONE (OUTPATIENT)
Dept: CARDIOLOGY CLINIC | Age: 83
End: 2018-01-01

## 2018-01-01 ENCOUNTER — OFFICE VISIT (OUTPATIENT)
Dept: CARDIOLOGY CLINIC | Age: 83
End: 2018-01-01

## 2018-01-01 DIAGNOSIS — I44.2 THIRD DEGREE HEART BLOCK (HCC): ICD-10-CM

## 2018-01-01 DIAGNOSIS — Z95.0 CARDIAC PACEMAKER IN SITU: Primary | ICD-10-CM

## 2018-01-31 ENCOUNTER — OFFICE VISIT (OUTPATIENT)
Dept: CARDIOLOGY CLINIC | Age: 83
End: 2018-01-31

## 2018-01-31 DIAGNOSIS — Z95.0 CARDIAC PACEMAKER IN SITU: Primary | ICD-10-CM

## 2018-05-02 ENCOUNTER — OFFICE VISIT (OUTPATIENT)
Dept: CARDIOLOGY CLINIC | Age: 83
End: 2018-05-02

## 2018-05-02 DIAGNOSIS — Z95.0 CARDIAC PACEMAKER IN SITU: Primary | ICD-10-CM

## 2018-11-07 NOTE — TELEPHONE ENCOUNTER
Pt son called to discuss letter he received regarding missed pacer appointment and concerned that she is unable to make any future clinic appointments. Pt son would like to discuss continuing remote check appointments at home for his mother moving forward.   Phone #[phone]  Thanks

## 2019-01-01 ENCOUNTER — HOSPICE ADMISSION (OUTPATIENT)
Dept: HOSPICE | Facility: HOSPICE | Age: 84
End: 2019-01-01
Payer: MEDICARE

## 2019-01-01 ENCOUNTER — HOME CARE VISIT (OUTPATIENT)
Dept: SCHEDULING | Facility: HOME HEALTH | Age: 84
End: 2019-01-01
Payer: MEDICARE

## 2019-01-01 ENCOUNTER — HOME CARE VISIT (OUTPATIENT)
Dept: HOSPICE | Facility: HOSPICE | Age: 84
End: 2019-01-01
Payer: MEDICARE

## 2019-01-01 ENCOUNTER — OFFICE VISIT (OUTPATIENT)
Dept: CARDIOLOGY CLINIC | Age: 84
End: 2019-01-01

## 2019-01-01 VITALS
BODY MASS INDEX: 17.84 KG/M2 | DIASTOLIC BLOOD PRESSURE: 67 MMHG | RESPIRATION RATE: 24 BRPM | HEIGHT: 58 IN | HEART RATE: 84 BPM | OXYGEN SATURATION: 94 % | SYSTOLIC BLOOD PRESSURE: 139 MMHG | WEIGHT: 85 LBS

## 2019-01-01 DIAGNOSIS — Z95.0 CARDIAC PACEMAKER IN SITU: Primary | ICD-10-CM

## 2019-01-01 PROCEDURE — 0651 HSPC ROUTINE HOME CARE

## 2019-01-01 PROCEDURE — 3331090004 HSPC SERVICE INTENSITY ADD-ON

## 2019-01-01 PROCEDURE — G0299 HHS/HOSPICE OF RN EA 15 MIN: HCPCS

## 2019-01-01 PROCEDURE — 3336500001 HSPC ELECTION

## 2019-07-01 NOTE — PROGRESS NOTES
In chart for auditing purposes. Weight bearing as tolerated Quiet play/No heavy lifting/No sports/gym/No excercise/for 2 weeks

## 2021-08-17 NOTE — PROGRESS NOTES
Bedside and Verbal shift change report given to Zhao Rayo RN (oncoming nurse) by Danielito Baker RN (offgoing nurse). Report included the following information SBAR, Kardex, OR Summary, Procedure Summary, Intake/Output and MAR. Patient Requested / Patient Referred for MDPP    Invitation for Complimentary HC Q&A on MDPP.    Pt requested a Q&A for 8/31/21 @ 11 am.    *I need to check Medicare coverage, she mentioned she has [facility] Hospital Coverage Part A?    -Pt requested MDPP program information emailed. (No phi in email.)  -Provided pt my office line by chance needs to connect with me prior to Q&A.    -Also provided pt [facility] scheduling teams number if needs to reschedule.    -GN

## (undated) DEVICE — INTENDED FOR TISSUE SEPARATION, AND OTHER PROCEDURES THAT REQUIRE A SHARP SURGICAL BLADE TO PUNCTURE OR CUT.: Brand: BARD-PARKER ® CARBON RIB-BACK BLADES

## (undated) DEVICE — 3000CC GUARDIAN II: Brand: GUARDIAN

## (undated) DEVICE — SUTURE MCRYL SZ 3-0 L27IN ABSRB UD L19MM PS-2 3/8 CIR PRIM Y427H

## (undated) DEVICE — T4 HOOD

## (undated) DEVICE — DERMABOND SKIN ADH 0.7ML -- DERMABOND ADVANCED 12/BX

## (undated) DEVICE — DRAPE,HIP,W/POUCHES,STERILE: Brand: MEDLINE

## (undated) DEVICE — GOWN,SIRUS,NONRNF,SETINSLV,2XL,18/CS: Brand: MEDLINE

## (undated) DEVICE — REM POLYHESIVE ADULT PATIENT RETURN ELECTRODE: Brand: VALLEYLAB

## (undated) DEVICE — INFECTION CONTROL KIT SYS

## (undated) DEVICE — 3M™ IOBAN™ 2 ANTIMICROBIAL INCISE DRAPE 6651EZ: Brand: IOBAN™ 2

## (undated) DEVICE — TELFA ADHESIVE ISLAND DRESSING: Brand: TELFA

## (undated) DEVICE — COVER LT HNDL BLU PLAS

## (undated) DEVICE — PATIENT PROTECTIVE PAD FOR IMP UNIVERSAL LATERAL HIP POSITIONER (ULP) (6/CASE): Brand: PATIENT PROTECTIVE PAD

## (undated) DEVICE — DRAPE,REIN 53X77,STERILE: Brand: MEDLINE

## (undated) DEVICE — OVERLAY MATRS H2IN FOAM CONVOLUTED STD DENS

## (undated) DEVICE — YANKAUER OPEN TIP, NO VENT: Brand: ARGYLE

## (undated) DEVICE — SUTURE PDS II SZ 0 L36IN ABSRB VLT L40MM CT 1/2 CIR Z358T

## (undated) DEVICE — DEVON™ KNEE AND BODY STRAP 60" X 3" (1.5 M X 7.6 CM): Brand: DEVON

## (undated) DEVICE — 1010 S-DRAPE TOWEL DRAPE 10/BX: Brand: STERI-DRAPE™

## (undated) DEVICE — Device

## (undated) DEVICE — (D)PREP SKN CHLRAPRP APPL 26ML -- CONVERT TO ITEM 371833

## (undated) DEVICE — SOLUTION IV 1000ML 0.9% SOD CHL

## (undated) DEVICE — SUTURE VCRL SZ 2-0 L18IN ABSRB UD L26MM CP-2 1/2 CIR REV J762D

## (undated) DEVICE — ABDUCTION PILLOW,MEDIUM: Brand: DEVON

## (undated) DEVICE — SUTURE PDS II SZ 2-0 L36IN ABSRB VLT CT L40MM 1/2 CIR TAPR Z357H

## (undated) DEVICE — SOLUTION IRRIG 3000ML 0.9% SOD CHL FLX CONT 0797208] ICU MEDICAL INC]

## (undated) DEVICE — SUTURE VCRL SZ 0 L18IN ABSRB VLT L40MM CT 1/2 CIR J752D

## (undated) DEVICE — STERILE POLYISOPRENE POWDER-FREE SURGICAL GLOVES: Brand: PROTEXIS

## (undated) DEVICE — STERILE POLYISOPRENE POWDER-FREE SURGICAL GLOVES WITH EMOLLIENT COATING: Brand: PROTEXIS

## (undated) DEVICE — DRAPE,U/ SHT,SPLIT,PLAS,STERIL: Brand: MEDLINE

## (undated) DEVICE — SUTURE STRATAFIX SPRL SZ 1 L14IN ABSRB VLT L48CM CTX 1/2 SXPD2B405